# Patient Record
Sex: FEMALE | Race: WHITE | NOT HISPANIC OR LATINO | Employment: UNEMPLOYED | RURAL
[De-identification: names, ages, dates, MRNs, and addresses within clinical notes are randomized per-mention and may not be internally consistent; named-entity substitution may affect disease eponyms.]

---

## 2017-02-09 ENCOUNTER — HISTORICAL (OUTPATIENT)
Dept: ADMINISTRATIVE | Facility: HOSPITAL | Age: 57
End: 2017-02-09

## 2017-02-10 LAB
LAB AP CLINICAL INFORMATION: NORMAL
LAB AP COMMENTS: NORMAL
LAB AP DIAGNOSIS - HISTORICAL: NORMAL
LAB AP GROSS PATHOLOGY - HISTORICAL: NORMAL
LAB AP SPECIMEN SUBMITTED - HISTORICAL: NORMAL

## 2019-08-21 ENCOUNTER — HISTORICAL (OUTPATIENT)
Dept: ADMINISTRATIVE | Facility: HOSPITAL | Age: 59
End: 2019-08-21

## 2019-08-21 LAB
CRC RECOMMENDATION EXT: NORMAL
HM COLONOSCOPY: NEGATIVE

## 2019-08-22 LAB
LAB AP CLINICAL INFORMATION: NORMAL
LAB AP DIAGNOSIS - HISTORICAL: NORMAL
LAB AP GROSS PATHOLOGY - HISTORICAL: NORMAL
LAB AP SPECIMEN SUBMITTED - HISTORICAL: NORMAL

## 2020-12-10 ENCOUNTER — HISTORICAL (OUTPATIENT)
Dept: ADMINISTRATIVE | Facility: HOSPITAL | Age: 60
End: 2020-12-10

## 2020-12-10 LAB
ANION GAP SERPL CALCULATED.3IONS-SCNC: 14 MMOL/L
BASOPHILS # BLD AUTO: 0.01 X10E3/UL (ref 0–0.2)
BASOPHILS NFR BLD AUTO: 0.1 % (ref 0–1)
BUN SERPL-MCNC: 15 MG/DL (ref 7–18)
CALCIUM SERPL-MCNC: 8.1 MG/DL (ref 8.5–10.1)
CHLORIDE SERPL-SCNC: 102 MMOL/L (ref 98–107)
CO2 SERPL-SCNC: 29 MMOL/L (ref 21–32)
CREAT SERPL-MCNC: 0.96 MG/DL (ref 0.55–1.02)
EOSINOPHIL # BLD AUTO: 0 X10E3/UL (ref 0–0.5)
EOSINOPHIL NFR BLD AUTO: 0 % (ref 1–4)
ERYTHROCYTE [DISTWIDTH] IN BLOOD BY AUTOMATED COUNT: 13.8 % (ref 11.5–14.5)
GLUCOSE SERPL-MCNC: 140 MG/DL (ref 74–106)
HCT VFR BLD AUTO: 37.8 % (ref 38–47)
HGB BLD-MCNC: 12.4 G/DL (ref 12–16)
IMM GRANULOCYTES # BLD AUTO: 0.03 X10E3/UL (ref 0–0.04)
IMM GRANULOCYTES NFR BLD: 0.4 % (ref 0–0.4)
LYMPHOCYTES # BLD AUTO: 1.55 X10E3/UL (ref 1–4.8)
LYMPHOCYTES NFR BLD AUTO: 19.5 % (ref 27–41)
MCH RBC QN AUTO: 30.2 PG (ref 27–31)
MCHC RBC AUTO-ENTMCNC: 32.8 G/DL (ref 32–36)
MCV RBC AUTO: 92.2 FL (ref 80–96)
MONOCYTES # BLD AUTO: 0.47 X10E3/UL (ref 0–0.8)
MONOCYTES NFR BLD AUTO: 5.9 % (ref 2–6)
MPC BLD CALC-MCNC: 10.7 FL (ref 9.4–12.4)
NEUTROPHILS # BLD AUTO: 5.87 X10E3/UL (ref 1.8–7.7)
NEUTROPHILS NFR BLD AUTO: 74.1 % (ref 53–65)
NRBC # BLD AUTO: 0 X10E3/UL (ref 0–0)
NRBC, AUTO (.00): 0 /100 (ref 0–0)
PLATELET # BLD AUTO: 210 X10E3/UL (ref 150–400)
POTASSIUM SERPL-SCNC: 3.2 MMOL/L (ref 3.5–5.1)
RBC # BLD AUTO: 4.1 X10E6/UL (ref 4.2–5.4)
SODIUM SERPL-SCNC: 142 MMOL/L (ref 136–145)
WBC # BLD AUTO: 7.93 X10E3/UL (ref 4.5–11)

## 2020-12-11 ENCOUNTER — HISTORICAL (OUTPATIENT)
Dept: ADMINISTRATIVE | Facility: HOSPITAL | Age: 60
End: 2020-12-11

## 2020-12-14 ENCOUNTER — HISTORICAL (OUTPATIENT)
Dept: ADMINISTRATIVE | Facility: HOSPITAL | Age: 60
End: 2020-12-14

## 2021-05-20 ENCOUNTER — OFFICE VISIT (OUTPATIENT)
Dept: FAMILY MEDICINE | Facility: CLINIC | Age: 61
End: 2021-05-20
Payer: COMMERCIAL

## 2021-05-20 VITALS
TEMPERATURE: 98 F | HEART RATE: 76 BPM | WEIGHT: 236 LBS | SYSTOLIC BLOOD PRESSURE: 136 MMHG | HEIGHT: 66 IN | DIASTOLIC BLOOD PRESSURE: 78 MMHG | BODY MASS INDEX: 37.93 KG/M2

## 2021-05-20 DIAGNOSIS — I10 ESSENTIAL HYPERTENSION: Primary | ICD-10-CM

## 2021-05-20 DIAGNOSIS — E78.5 DYSLIPIDEMIA: ICD-10-CM

## 2021-05-20 PROCEDURE — 99213 PR OFFICE/OUTPT VISIT, EST, LEVL III, 20-29 MIN: ICD-10-PCS | Mod: ,,, | Performed by: FAMILY MEDICINE

## 2021-05-20 PROCEDURE — 3008F PR BODY MASS INDEX (BMI) DOCUMENTED: ICD-10-PCS | Mod: ,,, | Performed by: FAMILY MEDICINE

## 2021-05-20 PROCEDURE — 3008F BODY MASS INDEX DOCD: CPT | Mod: ,,, | Performed by: FAMILY MEDICINE

## 2021-05-20 PROCEDURE — 99213 OFFICE O/P EST LOW 20 MIN: CPT | Mod: ,,, | Performed by: FAMILY MEDICINE

## 2021-05-20 RX ORDER — LISINOPRIL 5 MG/1
5 TABLET ORAL DAILY
Qty: 90 TABLET | Refills: 1 | Status: SHIPPED | OUTPATIENT
Start: 2021-05-20 | End: 2021-11-19 | Stop reason: SDUPTHER

## 2021-05-20 RX ORDER — MONTELUKAST SODIUM 10 MG/1
10 TABLET ORAL DAILY
Qty: 90 TABLET | Refills: 1 | Status: SHIPPED | OUTPATIENT
Start: 2021-05-20 | End: 2021-11-19 | Stop reason: SDUPTHER

## 2021-05-20 RX ORDER — MIRTAZAPINE 30 MG/1
TABLET, FILM COATED ORAL
COMMUNITY
Start: 2021-05-19 | End: 2023-12-15

## 2021-05-20 RX ORDER — HYDROCODONE BITARTRATE AND ACETAMINOPHEN 5; 325 MG/1; MG/1
TABLET ORAL
COMMUNITY
Start: 2021-05-07

## 2021-05-20 RX ORDER — ALBUTEROL SULFATE 90 UG/1
2 AEROSOL, METERED RESPIRATORY (INHALATION) EVERY 4 HOURS PRN
COMMUNITY
Start: 2021-02-19

## 2021-05-20 RX ORDER — ATORVASTATIN CALCIUM 10 MG/1
10 TABLET, FILM COATED ORAL DAILY
Qty: 90 TABLET | Refills: 1 | Status: SHIPPED | OUTPATIENT
Start: 2021-05-20 | End: 2021-11-19 | Stop reason: SDUPTHER

## 2021-05-20 RX ORDER — ARIPIPRAZOLE 15 MG/1
TABLET ORAL
COMMUNITY
Start: 2021-05-19 | End: 2023-09-18 | Stop reason: SDUPTHER

## 2021-05-20 RX ORDER — DULOXETIN HYDROCHLORIDE 30 MG/1
30 CAPSULE, DELAYED RELEASE ORAL
COMMUNITY

## 2021-05-20 RX ORDER — MELOXICAM 15 MG/1
15 TABLET ORAL DAILY
COMMUNITY
Start: 2021-02-12 | End: 2022-05-23

## 2021-05-20 RX ORDER — TRAZODONE HYDROCHLORIDE 100 MG/1
TABLET ORAL
COMMUNITY
Start: 2021-03-25

## 2021-05-20 RX ORDER — PRAMIPEXOLE DIHYDROCHLORIDE 0.5 MG/1
TABLET ORAL
COMMUNITY
Start: 2021-03-25 | End: 2023-04-10

## 2021-10-19 ENCOUNTER — CLINICAL SUPPORT (OUTPATIENT)
Dept: FAMILY MEDICINE | Facility: CLINIC | Age: 61
End: 2021-10-19
Payer: COMMERCIAL

## 2021-10-19 VITALS
BODY MASS INDEX: 38.25 KG/M2 | OXYGEN SATURATION: 97 % | HEART RATE: 90 BPM | DIASTOLIC BLOOD PRESSURE: 86 MMHG | HEIGHT: 66 IN | SYSTOLIC BLOOD PRESSURE: 130 MMHG | WEIGHT: 238 LBS | TEMPERATURE: 98 F

## 2021-10-19 DIAGNOSIS — Z23 NEEDS FLU SHOT: Primary | ICD-10-CM

## 2021-10-19 PROCEDURE — 90686 IIV4 VACC NO PRSV 0.5 ML IM: CPT | Mod: ,,, | Performed by: FAMILY MEDICINE

## 2021-10-19 PROCEDURE — 90686 FLU VACCINE (QUAD) GREATER THAN OR EQUAL TO 3YO PRESERVATIVE FREE IM: ICD-10-PCS | Mod: ,,, | Performed by: FAMILY MEDICINE

## 2021-10-19 PROCEDURE — 90471 FLU VACCINE (QUAD) GREATER THAN OR EQUAL TO 3YO PRESERVATIVE FREE IM: ICD-10-PCS | Mod: ,,, | Performed by: FAMILY MEDICINE

## 2021-10-19 PROCEDURE — 90471 IMMUNIZATION ADMIN: CPT | Mod: ,,, | Performed by: FAMILY MEDICINE

## 2021-11-19 ENCOUNTER — OFFICE VISIT (OUTPATIENT)
Dept: FAMILY MEDICINE | Facility: CLINIC | Age: 61
End: 2021-11-19
Payer: COMMERCIAL

## 2021-11-19 VITALS
WEIGHT: 231 LBS | OXYGEN SATURATION: 96 % | DIASTOLIC BLOOD PRESSURE: 80 MMHG | SYSTOLIC BLOOD PRESSURE: 130 MMHG | BODY MASS INDEX: 37.12 KG/M2 | TEMPERATURE: 97 F | HEIGHT: 66 IN | HEART RATE: 94 BPM

## 2021-11-19 DIAGNOSIS — R73.9 HYPERGLYCEMIA: Primary | ICD-10-CM

## 2021-11-19 DIAGNOSIS — I10 ESSENTIAL HYPERTENSION: ICD-10-CM

## 2021-11-19 DIAGNOSIS — M54.16 LUMBAR RADICULOPATHY: ICD-10-CM

## 2021-11-19 DIAGNOSIS — M54.16 LUMBAR RADICULOPATHY, CHRONIC: ICD-10-CM

## 2021-11-19 LAB
ANION GAP SERPL CALCULATED.3IONS-SCNC: 10 MMOL/L (ref 7–16)
BUN SERPL-MCNC: 23 MG/DL (ref 7–18)
BUN/CREAT SERPL: 26 (ref 6–20)
CALCIUM SERPL-MCNC: 8.7 MG/DL (ref 8.5–10.1)
CHLORIDE SERPL-SCNC: 104 MMOL/L (ref 98–107)
CO2 SERPL-SCNC: 30 MMOL/L (ref 21–32)
CREAT SERPL-MCNC: 0.9 MG/DL (ref 0.55–1.02)
EST. AVERAGE GLUCOSE BLD GHB EST-MCNC: 107 MG/DL
GLUCOSE SERPL-MCNC: 113 MG/DL (ref 74–106)
HBA1C MFR BLD HPLC: 5.8 % (ref 4.5–6.6)
POTASSIUM SERPL-SCNC: 3.9 MMOL/L (ref 3.5–5.1)
SODIUM SERPL-SCNC: 140 MMOL/L (ref 136–145)

## 2021-11-19 PROCEDURE — 3079F DIAST BP 80-89 MM HG: CPT | Mod: ,,, | Performed by: FAMILY MEDICINE

## 2021-11-19 PROCEDURE — 1160F RVW MEDS BY RX/DR IN RCRD: CPT | Mod: ,,, | Performed by: FAMILY MEDICINE

## 2021-11-19 PROCEDURE — 83036 HEMOGLOBIN A1C: ICD-10-PCS | Mod: ,,, | Performed by: CLINICAL MEDICAL LABORATORY

## 2021-11-19 PROCEDURE — 1159F MED LIST DOCD IN RCRD: CPT | Mod: ,,, | Performed by: FAMILY MEDICINE

## 2021-11-19 PROCEDURE — 3075F SYST BP GE 130 - 139MM HG: CPT | Mod: ,,, | Performed by: FAMILY MEDICINE

## 2021-11-19 PROCEDURE — 80048 BASIC METABOLIC PNL TOTAL CA: CPT | Mod: ,,, | Performed by: CLINICAL MEDICAL LABORATORY

## 2021-11-19 PROCEDURE — 4010F ACE/ARB THERAPY RXD/TAKEN: CPT | Mod: ,,, | Performed by: FAMILY MEDICINE

## 2021-11-19 PROCEDURE — 1160F PR REVIEW ALL MEDS BY PRESCRIBER/CLIN PHARMACIST DOCUMENTED: ICD-10-PCS | Mod: ,,, | Performed by: FAMILY MEDICINE

## 2021-11-19 PROCEDURE — 99214 OFFICE O/P EST MOD 30 MIN: CPT | Mod: ,,, | Performed by: FAMILY MEDICINE

## 2021-11-19 PROCEDURE — 4010F PR ACE/ARB THEARPY RXD/TAKEN: ICD-10-PCS | Mod: ,,, | Performed by: FAMILY MEDICINE

## 2021-11-19 PROCEDURE — 1159F PR MEDICATION LIST DOCUMENTED IN MEDICAL RECORD: ICD-10-PCS | Mod: ,,, | Performed by: FAMILY MEDICINE

## 2021-11-19 PROCEDURE — 3008F BODY MASS INDEX DOCD: CPT | Mod: ,,, | Performed by: FAMILY MEDICINE

## 2021-11-19 PROCEDURE — 3008F PR BODY MASS INDEX (BMI) DOCUMENTED: ICD-10-PCS | Mod: ,,, | Performed by: FAMILY MEDICINE

## 2021-11-19 PROCEDURE — 83036 HEMOGLOBIN GLYCOSYLATED A1C: CPT | Mod: ,,, | Performed by: CLINICAL MEDICAL LABORATORY

## 2021-11-19 PROCEDURE — 80048 BASIC METABOLIC PANEL: ICD-10-PCS | Mod: ,,, | Performed by: CLINICAL MEDICAL LABORATORY

## 2021-11-19 PROCEDURE — 99214 PR OFFICE/OUTPT VISIT, EST, LEVL IV, 30-39 MIN: ICD-10-PCS | Mod: ,,, | Performed by: FAMILY MEDICINE

## 2021-11-19 PROCEDURE — 3079F PR MOST RECENT DIASTOLIC BLOOD PRESSURE 80-89 MM HG: ICD-10-PCS | Mod: ,,, | Performed by: FAMILY MEDICINE

## 2021-11-19 PROCEDURE — 3075F PR MOST RECENT SYSTOLIC BLOOD PRESS GE 130-139MM HG: ICD-10-PCS | Mod: ,,, | Performed by: FAMILY MEDICINE

## 2021-11-19 RX ORDER — MONTELUKAST SODIUM 10 MG/1
10 TABLET ORAL DAILY
Qty: 90 TABLET | Refills: 1 | Status: SHIPPED | OUTPATIENT
Start: 2021-11-19 | End: 2022-08-13 | Stop reason: SDUPTHER

## 2021-11-19 RX ORDER — ATORVASTATIN CALCIUM 10 MG/1
10 TABLET, FILM COATED ORAL DAILY
Qty: 90 TABLET | Refills: 1 | Status: SHIPPED | OUTPATIENT
Start: 2021-11-19 | End: 2022-07-04 | Stop reason: SDUPTHER

## 2021-11-19 RX ORDER — LISINOPRIL 5 MG/1
5 TABLET ORAL DAILY
Qty: 90 TABLET | Refills: 1 | Status: SHIPPED | OUTPATIENT
Start: 2021-11-19 | End: 2022-11-11 | Stop reason: SDUPTHER

## 2021-11-22 ENCOUNTER — TELEPHONE (OUTPATIENT)
Dept: FAMILY MEDICINE | Facility: CLINIC | Age: 61
End: 2021-11-22
Payer: COMMERCIAL

## 2021-11-22 RX ORDER — DIAZEPAM 10 MG/1
TABLET ORAL
Qty: 1 TABLET | Refills: 0 | Status: SHIPPED | OUTPATIENT
Start: 2021-11-22 | End: 2022-11-11

## 2021-11-23 ENCOUNTER — IMMUNIZATION (OUTPATIENT)
Dept: FAMILY MEDICINE | Facility: CLINIC | Age: 61
End: 2021-11-23
Payer: COMMERCIAL

## 2021-11-23 DIAGNOSIS — Z23 NEED FOR VACCINATION: Primary | ICD-10-CM

## 2021-11-23 PROCEDURE — 91306 COVID-19, MRNA, LNP-S, PF, 100 MCG/0.25 ML DOSE VACCINE (MODERNA BOOSTER): CPT | Mod: ,,, | Performed by: FAMILY MEDICINE

## 2021-11-23 PROCEDURE — 0064A COVID-19, MRNA, LNP-S, PF, 100 MCG/0.25 ML DOSE VACCINE (MODERNA BOOSTER): ICD-10-PCS | Mod: ,,, | Performed by: FAMILY MEDICINE

## 2021-11-23 PROCEDURE — 0064A COVID-19, MRNA, LNP-S, PF, 100 MCG/0.25 ML DOSE VACCINE (MODERNA BOOSTER): CPT | Mod: ,,, | Performed by: FAMILY MEDICINE

## 2021-11-23 PROCEDURE — 91306 COVID-19, MRNA, LNP-S, PF, 100 MCG/0.25 ML DOSE VACCINE (MODERNA BOOSTER): ICD-10-PCS | Mod: ,,, | Performed by: FAMILY MEDICINE

## 2021-12-06 ENCOUNTER — TELEPHONE (OUTPATIENT)
Dept: FAMILY MEDICINE | Facility: CLINIC | Age: 61
End: 2021-12-06
Payer: COMMERCIAL

## 2022-01-04 DIAGNOSIS — M54.16 LUMBAR RADICULOPATHY: Primary | ICD-10-CM

## 2022-01-05 ENCOUNTER — OFFICE VISIT (OUTPATIENT)
Dept: SPINE | Facility: CLINIC | Age: 62
End: 2022-01-05
Payer: COMMERCIAL

## 2022-01-05 ENCOUNTER — HOSPITAL ENCOUNTER (OUTPATIENT)
Dept: RADIOLOGY | Facility: HOSPITAL | Age: 62
Discharge: HOME | End: 2022-01-05
Attending: ORTHOPAEDIC SURGERY
Payer: COMMERCIAL

## 2022-01-05 DIAGNOSIS — M54.16 LUMBAR RADICULOPATHY: ICD-10-CM

## 2022-01-05 PROCEDURE — 1159F PR MEDICATION LIST DOCUMENTED IN MEDICAL RECORD: ICD-10-PCS | Mod: ,,, | Performed by: ORTHOPAEDIC SURGERY

## 2022-01-05 PROCEDURE — 72110 X-RAY EXAM L-2 SPINE 4/>VWS: CPT | Mod: TC

## 2022-01-05 PROCEDURE — 72110 XR LUMBAR SPINE 5 VIEW WITH FLEX AND EXT: ICD-10-PCS | Mod: 26,,, | Performed by: ORTHOPAEDIC SURGERY

## 2022-01-05 PROCEDURE — 1159F MED LIST DOCD IN RCRD: CPT | Mod: ,,, | Performed by: ORTHOPAEDIC SURGERY

## 2022-01-05 PROCEDURE — 99204 OFFICE O/P NEW MOD 45 MIN: CPT | Mod: S$PBB,,, | Performed by: ORTHOPAEDIC SURGERY

## 2022-01-05 PROCEDURE — 99204 PR OFFICE/OUTPT VISIT, NEW, LEVL IV, 45-59 MIN: ICD-10-PCS | Mod: S$PBB,,, | Performed by: ORTHOPAEDIC SURGERY

## 2022-01-05 PROCEDURE — 99213 OFFICE O/P EST LOW 20 MIN: CPT | Mod: PBBFAC | Performed by: ORTHOPAEDIC SURGERY

## 2022-01-05 PROCEDURE — 72110 X-RAY EXAM L-2 SPINE 4/>VWS: CPT | Mod: 26,,, | Performed by: ORTHOPAEDIC SURGERY

## 2022-01-05 NOTE — PROGRESS NOTES
MDM/time:  Greater than 45 minutes spent on this encounter including 15 minutes reviewing imaging and notes, 20 minutes with the patient, 10 minutes documentation    ASSESSMENT:  61 y.o. female with lumbar spondylolisthesis at L4-5 and L5-S1 with radiculopathy and neurogenic claudication    PLAN:  Neurontin 300 mg 1 tablet 3 times a day  Physical therapy lumbar spine continue home exercise program  Follow-up with Dr. Odonnell to discuss other epidural injections versus spinal cord stimulator  We discussed today that surgery would not be an option at this time  Follow-up as needed    HPI:  61 y.o. female here for evaluation of low back pain that radiates into bilateral hips down the side of bilateral leg stopping at the knee.  Patient reports a 6 year history of back pain fell a few years ago onto her tailbone did not fracture her tailbone that bruise did badly.  Patient reports since that time she has had issues with her back.  Has been seeing pain clinic with multiple injections and rhizotomies in the past.  Has completed physical therapy that did give some relief of pain.  Patient reports difficulty with balance due to pain in her legs but no recent falls.  Denies bladder bowel incontinence.  Difficulty walking distances due to hip and leg pain.  Currently takes hydrocodone 5/325 mg as needed for pain and Cymbalta 30 mg daily.  Has completed physical therapy and continued is to do her home medically directed exercises.  Her last epidural injection was 2021 with 2 weeks of pain relief.  Recent MRI 2021.  No prior spine surgery.  Patient is not a smoker.    IMAGIN2022 lumbar spine x-ray reviewed showed:   On the AP there is normal coronal alignment.  There are 5 non-rib-bearing lumbar vertebrae.  On the lateral there is decreased lumbar lordosis.  There is spondylotic disease with decreased disc height and osteophyte formation noted.  Grade 1 anterolisthesis at L4-5 and L5-S1    12/3/2021  MRI lumbar spine reviewed showed:  L4-5 reduced spondylolisthesis, moderate bilateral lateral recess stenosis, moderate bilateral foraminal stenosis  L5-S1 reduced spondylolisthesis mild to moderate bilateral foraminal stenosis    Past Medical History:   Diagnosis Date    Bipolar disorder     Depression     Hyperlipidemia     Hypertension      History reviewed. No pertinent surgical history.  Social History     Tobacco Use    Smoking status: Never Smoker    Smokeless tobacco: Never Used   Substance Use Topics    Alcohol use: Never    Drug use: Never      Current Outpatient Medications   Medication Instructions    albuterol (PROVENTIL/VENTOLIN HFA) 90 mcg/actuation inhaler 2 puffs, Inhalation, Every 4 hours PRN    ARIPiprazole (ABILIFY) 15 MG Tab No dose, route, or frequency recorded.    atorvastatin (LIPITOR) 20 MG tablet TAKE 1 TABLET BY MOUTH AT BEDTIME    atorvastatin (LIPITOR) 10 mg, Oral, Daily    diazePAM (VALIUM) 10 MG Tab One half to one tablet as needed before MRI    DULoxetine (CYMBALTA) 30 mg, Oral    fluticasone propionate (FLONASE) 50 mcg/actuation nasal spray SPRAY 2 SPRAYS INTO EACH NOSTRIL EVERY DAY    HYDROcodone-acetaminophen (NORCO) 5-325 mg per tablet No dose, route, or frequency recorded.    lisinopriL (PRINIVIL,ZESTRIL) 5 mg, Oral, Daily    meloxicam (MOBIC) 15 mg, Oral, Daily    mirtazapine (REMERON) 30 MG tablet No dose, route, or frequency recorded.    montelukast (SINGULAIR) 10 mg, Oral, Daily    pramipexole (MIRAPEX) 0.5 MG tablet TAKE 1 TABLET BY MOUTH EVERY DAY BEFORE BEDTIME    traZODone (DESYREL) 100 MG tablet TAKE 1 TABLET BY MOUTH EVERY DAY AT BEDTIME AS DIRECTED        EXAM:  Constitutional  General Appearance:  There is no height or weight on file to calculate BMI., NAD  Psychiatric   Orientation: Oriented to time, oriented to place, oriented to person  Mood and Affect: Active and alert, normal mood, normal affect  Gait and Station   Appearance:  Antalgic gait  to the right, normal tandem gait, unable to walk on toes, able to walk on heels    LUMBAR  Musculoskeletal System   Hips: Normal appearance, no leg length discrepancy, normal motion; left, normal motion; right    Lumbar Spine                   Inspection:  Normal alignment, normal sagittal balance                  Range of motion:  Decreased flexion, extension, lateral bending, rotation. Pain with range of motion                  Bony Palpation of the Lumbar Spine:  No tenderness of the spinous process, no tenderness of the sacrum, no tenderness of the coccyx                  Bony Palpation of the Right Hip:  No tenderness of the iliac crest, no tenderness of the sciatic notch, no tenderness of the SI joint                  Bony Palpation of the Left Hip:  No tenderness of the iliac crest, no tenderness of the sciatic notch, no tenderness of the SI joint                  Soft Tissue Palpation on the Right:  No tenderness of the paraspinal region, no tenderness of the iliolumbar region                  Soft Tissue Palpation on the Left:  No tenderness of the paraspinal region, no tenderness of the iliolumbar region    Motor Strength   L1 Right:  Hip flexion iliopsoas 5/5    L1 Left:  Hip flexion iliopsoas 5/5              L2-L4 Right:  Knee extension quadriceps 5/5, tibialis anterior 5/5              L2-L4 Left:  Knee extension quadriceps 5/5, tibialis anterior 5/5   L5 Right:  Extensor hallucis llongus 5/5,    L5 Left:  Extensor hallucis longus 5/5,    S1 Right:  Plantar flexion gastrocnemius 5/5   S1 Left:  Plantar flexion gastrocnemius 5/5    Neurological System   Ankle Reflex Right:  normal   Ankle Reflex Left: normal   Knee Reflex Right:  normal   Knee Reflex Left:  normal   Sensation on the Right:  L2 normal, L3 normal, L4 normal, L5 normal, S1 normal   Sensation on the Left:  L2 normal, L3 normal, L4 normal, L5 normal, S1 normal              Special Test on the Right:  Seated straight leg raising test  negative, no clonus of the ankle              Special Test on the Left:  Seated straight leg raising test negative, no clonus of the ankle    Skin   Lumbosacral Spine:  Normal skin    Cardiovascular System   Arterial Pulses Right:  Posterior tibialis normal, dorsalis pedis normal   Arterial Pulses Left:  Posterior tibialis normal, dorsalis pedis normal   Edema Right: None   Edema Left:  None

## 2022-01-05 NOTE — PROGRESS NOTES
AP, lateral, flexion/extension views of the lumbar spine reviewed    On the AP there is normal coronal alignment.  There are 5 non-rib-bearing lumbar vertebrae.  On the lateral there is decreased lumbar lordosis.  There is spondylotic disease with decreased disc height and osteophyte formation noted.  Grade 1 anterolisthesis at L4-5 and L5-S1    Impression:  Spondylotic changes of the lumbar spine as noted above

## 2022-01-07 RX ORDER — GABAPENTIN 300 MG/1
300 CAPSULE ORAL 3 TIMES DAILY
Qty: 90 CAPSULE | Refills: 11 | Status: SHIPPED | OUTPATIENT
Start: 2022-01-07 | End: 2023-12-15

## 2022-04-21 ENCOUNTER — OFFICE VISIT (OUTPATIENT)
Dept: FAMILY MEDICINE | Facility: CLINIC | Age: 62
End: 2022-04-21
Payer: COMMERCIAL

## 2022-04-21 VITALS
OXYGEN SATURATION: 98 % | TEMPERATURE: 98 F | WEIGHT: 228 LBS | DIASTOLIC BLOOD PRESSURE: 80 MMHG | HEART RATE: 87 BPM | BODY MASS INDEX: 36.64 KG/M2 | HEIGHT: 66 IN | SYSTOLIC BLOOD PRESSURE: 140 MMHG

## 2022-04-21 DIAGNOSIS — J30.2 SEASONAL ALLERGIES: ICD-10-CM

## 2022-04-21 DIAGNOSIS — Z20.822 ENCOUNTER FOR LABORATORY TESTING FOR COVID-19 VIRUS: Primary | ICD-10-CM

## 2022-04-21 LAB
CTP QC/QA: YES
FLUAV AG NPH QL: NEGATIVE
FLUBV AG NPH QL: NEGATIVE
SARS-COV-2 AG RESP QL IA.RAPID: NEGATIVE

## 2022-04-21 PROCEDURE — 3077F PR MOST RECENT SYSTOLIC BLOOD PRESSURE >= 140 MM HG: ICD-10-PCS | Mod: ,,, | Performed by: FAMILY MEDICINE

## 2022-04-21 PROCEDURE — 4010F ACE/ARB THERAPY RXD/TAKEN: CPT | Mod: ,,, | Performed by: FAMILY MEDICINE

## 2022-04-21 PROCEDURE — 99213 OFFICE O/P EST LOW 20 MIN: CPT | Mod: ,,, | Performed by: FAMILY MEDICINE

## 2022-04-21 PROCEDURE — 3008F BODY MASS INDEX DOCD: CPT | Mod: ,,, | Performed by: FAMILY MEDICINE

## 2022-04-21 PROCEDURE — 1159F PR MEDICATION LIST DOCUMENTED IN MEDICAL RECORD: ICD-10-PCS | Mod: ,,, | Performed by: FAMILY MEDICINE

## 2022-04-21 PROCEDURE — 3008F PR BODY MASS INDEX (BMI) DOCUMENTED: ICD-10-PCS | Mod: ,,, | Performed by: FAMILY MEDICINE

## 2022-04-21 PROCEDURE — 1160F RVW MEDS BY RX/DR IN RCRD: CPT | Mod: ,,, | Performed by: FAMILY MEDICINE

## 2022-04-21 PROCEDURE — 87428 SARSCOV & INF VIR A&B AG IA: CPT | Mod: QW,,, | Performed by: FAMILY MEDICINE

## 2022-04-21 PROCEDURE — 1159F MED LIST DOCD IN RCRD: CPT | Mod: ,,, | Performed by: FAMILY MEDICINE

## 2022-04-21 PROCEDURE — 4010F PR ACE/ARB THEARPY RXD/TAKEN: ICD-10-PCS | Mod: ,,, | Performed by: FAMILY MEDICINE

## 2022-04-21 PROCEDURE — 1160F PR REVIEW ALL MEDS BY PRESCRIBER/CLIN PHARMACIST DOCUMENTED: ICD-10-PCS | Mod: ,,, | Performed by: FAMILY MEDICINE

## 2022-04-21 PROCEDURE — 3079F DIAST BP 80-89 MM HG: CPT | Mod: ,,, | Performed by: FAMILY MEDICINE

## 2022-04-21 PROCEDURE — 3077F SYST BP >= 140 MM HG: CPT | Mod: ,,, | Performed by: FAMILY MEDICINE

## 2022-04-21 PROCEDURE — 3079F PR MOST RECENT DIASTOLIC BLOOD PRESSURE 80-89 MM HG: ICD-10-PCS | Mod: ,,, | Performed by: FAMILY MEDICINE

## 2022-04-21 PROCEDURE — 99213 PR OFFICE/OUTPT VISIT, EST, LEVL III, 20-29 MIN: ICD-10-PCS | Mod: ,,, | Performed by: FAMILY MEDICINE

## 2022-04-21 PROCEDURE — 87428 POCT SARS-COV2 (COVID) WITH FLU ANTIGEN: ICD-10-PCS | Mod: QW,,, | Performed by: FAMILY MEDICINE

## 2022-04-21 RX ORDER — CETIRIZINE HYDROCHLORIDE 10 MG/1
10 TABLET ORAL DAILY
Qty: 30 TABLET | Refills: 0 | Status: SHIPPED | OUTPATIENT
Start: 2022-04-21 | End: 2022-05-13

## 2022-04-21 RX ORDER — BENZONATATE 100 MG/1
CAPSULE ORAL
Qty: 30 CAPSULE | Refills: 0 | Status: SHIPPED | OUTPATIENT
Start: 2022-04-21 | End: 2022-11-11

## 2022-04-21 RX ORDER — ESTRADIOL 1 MG/1
TABLET ORAL
COMMUNITY
Start: 2022-04-03 | End: 2022-10-19 | Stop reason: DRUGHIGH

## 2022-04-21 RX ORDER — PREDNISONE 20 MG/1
20 TABLET ORAL DAILY
Qty: 5 TABLET | Refills: 0 | Status: SHIPPED | OUTPATIENT
Start: 2022-04-21 | End: 2022-05-23

## 2022-04-21 RX ORDER — IPRATROPIUM BROMIDE 21 UG/1
2 SPRAY, METERED NASAL 3 TIMES DAILY
Qty: 15 ML | Refills: 0 | Status: SHIPPED | OUTPATIENT
Start: 2022-04-21 | End: 2022-05-13

## 2022-04-21 NOTE — PROGRESS NOTES
[facility]    Chief Complaint      Chief Complaint   Patient presents with    Sore Throat     X1 day    Cough     X 1 day, runny nose       History of Present Illness      Maureen Easley is a 61 y.o. female who presents today for cough and congestion. Symptoms started yesterday.  with similar symptoms a few days prior. Noted nasal congestion, cough, and headache. Denies fever, chills, mylagia, or changes in taste/smell.    Past Medical History:  Past Medical History:   Diagnosis Date    Bipolar disorder     Depression     Hyperlipidemia     Hypertension        Past Surgical History:   has no past surgical history on file.    Social History:  Social History     Tobacco Use    Smoking status: Never Smoker    Smokeless tobacco: Never Used   Substance Use Topics    Alcohol use: Never    Drug use: Never       I personally reviewed all past medical, surgical, and social.     Review of Systems   Constitutional: Negative for fatigue and fever.   HENT: Positive for congestion, sneezing and sore throat. Negative for ear pain.    Eyes: Negative for pain and visual disturbance.   Respiratory: Positive for cough. Negative for chest tightness and shortness of breath.    Cardiovascular: Negative for chest pain and leg swelling.   Gastrointestinal: Negative for abdominal pain.   Genitourinary: Negative for difficulty urinating.   Musculoskeletal: Negative for gait problem and myalgias.   Skin: Negative for rash.   Neurological: Positive for headaches. Negative for dizziness and light-headedness.   Hematological: Does not bruise/bleed easily.        Medications:  Outpatient Encounter Medications as of 4/21/2022   Medication Sig Dispense Refill    albuterol (PROVENTIL/VENTOLIN HFA) 90 mcg/actuation inhaler Inhale 2 puffs into the lungs every 4 (four) hours as needed.      ARIPiprazole (ABILIFY) 15 MG Tab       atorvastatin (LIPITOR) 10 MG tablet Take 1 tablet (10 mg total) by mouth once daily. 90 tablet  1    atorvastatin (LIPITOR) 20 MG tablet TAKE 1 TABLET BY MOUTH AT BEDTIME (Patient not taking: Reported on 2021) 90 tablet 0    benzonatate (TESSALON) 100 MG capsule One to two capsules three times daily as needed for cough 30 capsule 0    cetirizine (ZYRTEC) 10 MG tablet Take 1 tablet (10 mg total) by mouth once daily. 30 tablet 0    diazePAM (VALIUM) 10 MG Tab One half to one tablet as needed before MRI 1 tablet 0    DULoxetine (CYMBALTA) 30 MG capsule Take 30 mg by mouth.      estradioL (ESTRACE) 1 MG tablet SMARTSI Tablet(s) Vaginal Twice a Week PRN      fluticasone propionate (FLONASE) 50 mcg/actuation nasal spray SPRAY 2 SPRAYS INTO EACH NOSTRIL EVERY DAY 48 mL 0    gabapentin (NEURONTIN) 300 MG capsule Take 1 capsule (300 mg total) by mouth 3 (three) times daily. 90 capsule 11    HYDROcodone-acetaminophen (NORCO) 5-325 mg per tablet       ipratropium (ATROVENT) 21 mcg (0.03 %) nasal spray 2 sprays by Nasal route 3 (three) times daily. 15 mL 0    lisinopriL (PRINIVIL,ZESTRIL) 5 MG tablet Take 1 tablet (5 mg total) by mouth once daily. 90 tablet 1    meloxicam (MOBIC) 15 MG tablet Take 15 mg by mouth once daily.      mirtazapine (REMERON) 30 MG tablet       montelukast (SINGULAIR) 10 mg tablet Take 1 tablet (10 mg total) by mouth once daily. 90 tablet 1    pramipexole (MIRAPEX) 0.5 MG tablet TAKE 1 TABLET BY MOUTH EVERY DAY BEFORE BEDTIME      predniSONE (DELTASONE) 20 MG tablet Take 1 tablet (20 mg total) by mouth once daily. 5 tablet 0    traZODone (DESYREL) 100 MG tablet TAKE 1 TABLET BY MOUTH EVERY DAY AT BEDTIME AS DIRECTED       No facility-administered encounter medications on file as of 2022.       Allergies:  Review of patient's allergies indicates:   Allergen Reactions    Clarithromycin Rash       Health Maintenance:  Immunization History   Administered Date(s) Administered    COVID-19 MRNA, LN-S PF (MODERNA HALF 0.25 ML DOSE) 2021    COVID-19, MRNA, LN-S, PF  "(Pfizer) (Purple Cap) 04/05/2021, 04/30/2021    Influenza - Quadrivalent - PF *Preferred* (6 months and older) 10/19/2021      Health Maintenance   Topic Date Due    Hepatitis C Screening  Never done    Lipid Panel  Never done    TETANUS VACCINE  Never done    Mammogram  Never done        Physical Exam      Vital Signs  Temp: 97.9 °F (36.6 °C)  Temp src: Oral  Pulse: 87  SpO2: 98 %  BP: (!) 140/80  BP Location: Left arm  Patient Position: Sitting  Height and Weight  Height: 5' 6" (167.6 cm)  Weight: 103.4 kg (228 lb)  BSA (Calculated - sq m): 2.19 sq meters  BMI (Calculated): 36.8  Weight in (lb) to have BMI = 25: 154.6]    Physical Exam  Constitutional:       Appearance: Normal appearance.   HENT:      Head: Normocephalic and atraumatic.      Right Ear: Tympanic membrane and ear canal normal.      Left Ear: Tympanic membrane and ear canal normal.      Nose: Congestion present.      Mouth/Throat:      Mouth: Mucous membranes are moist.      Pharynx: Oropharynx is clear.   Eyes:      Extraocular Movements: Extraocular movements intact.      Conjunctiva/sclera: Conjunctivae normal.      Pupils: Pupils are equal, round, and reactive to light.   Cardiovascular:      Rate and Rhythm: Normal rate and regular rhythm.      Pulses: Normal pulses.           Radial pulses are 2+ on the right side and 2+ on the left side.      Heart sounds: Normal heart sounds.   Pulmonary:      Effort: Pulmonary effort is normal.      Breath sounds: Normal breath sounds.   Abdominal:      Palpations: Abdomen is soft.      Tenderness: There is no abdominal tenderness.   Musculoskeletal:         General: Normal range of motion.      Right lower leg: No edema.      Left lower leg: No edema.   Skin:     General: Skin is warm and dry.      Findings: No rash.   Neurological:      General: No focal deficit present.      Mental Status: She is alert. Mental status is at baseline.   Psychiatric:         Mood and Affect: Mood normal.      "     Laboratory:  CBC:  Recent Labs   Lab 12/10/20  2128   WBC 7.93   RBC 4.10 L   Hemoglobin 12.4   Hematocrit 37.8 L   Platelet Count 210   MCV 92.2   MCH 30.2   MCHC 32.8     CMP:  Recent Labs   Lab 11/19/21  1012   Glucose 113 H   Calcium 8.7   Sodium 140   Potassium 3.9   CO2 30   Chloride 104   BUN 23 H     LIPIDS:      TSH:      A1C:  Recent Labs   Lab 11/19/21  1012   Hemoglobin A1C 5.8       Assessment/Plan     Maureen Ealsey is a 61 y.o.female with:     1. Encounter for laboratory testing for COVID-19 virus  - POCT SARS-COV2 (COVID) with Flu Antigen    2. Seasonal allergies  - Covid and flu negative  - prednisone 20mg daily x5 days  - intranasal Atrovent p.r.n. for nasal congestion  - benzonatate +OTC Mucinex for cough  - daily antihistamine     Total time spent face-to-face and non-face-to-face coordinating care for this encounter was: 20 min    Chronic conditions status updated as per HPI.  Other than changes above, cont current medications and maintain follow up with specialists.  Return to clinic PRN.    Tanvir Munguia DO  [facility]

## 2022-05-20 ENCOUNTER — OFFICE VISIT (OUTPATIENT)
Dept: FAMILY MEDICINE | Facility: CLINIC | Age: 62
End: 2022-05-20
Payer: COMMERCIAL

## 2022-05-20 VITALS
OXYGEN SATURATION: 96 % | TEMPERATURE: 99 F | SYSTOLIC BLOOD PRESSURE: 122 MMHG | HEART RATE: 85 BPM | WEIGHT: 233 LBS | DIASTOLIC BLOOD PRESSURE: 74 MMHG | HEIGHT: 66 IN | BODY MASS INDEX: 37.45 KG/M2

## 2022-05-20 DIAGNOSIS — Z13.1 SCREENING FOR DIABETES MELLITUS: ICD-10-CM

## 2022-05-20 DIAGNOSIS — Z13.220 SCREENING FOR LIPID DISORDERS: ICD-10-CM

## 2022-05-20 DIAGNOSIS — Z00.00 WELL ADULT EXAM: Primary | ICD-10-CM

## 2022-05-20 DIAGNOSIS — Z23 NEED FOR VACCINATION FOR ZOSTER: ICD-10-CM

## 2022-05-20 LAB
CHOLEST SERPL-MCNC: 196 MG/DL (ref 0–200)
CHOLEST/HDLC SERPL: 3.1 {RATIO}
GLUCOSE SERPL-MCNC: 98 MG/DL (ref 74–106)
HDLC SERPL-MCNC: 64 MG/DL (ref 40–60)
LDLC SERPL CALC-MCNC: 96 MG/DL
LDLC/HDLC SERPL: 1.5 {RATIO}
NONHDLC SERPL-MCNC: 132 MG/DL
TRIGL SERPL-MCNC: 178 MG/DL (ref 35–150)
VLDLC SERPL-MCNC: 36 MG/DL

## 2022-05-20 PROCEDURE — 3074F PR MOST RECENT SYSTOLIC BLOOD PRESSURE < 130 MM HG: ICD-10-PCS | Mod: ,,, | Performed by: FAMILY MEDICINE

## 2022-05-20 PROCEDURE — 3008F PR BODY MASS INDEX (BMI) DOCUMENTED: ICD-10-PCS | Mod: ,,, | Performed by: FAMILY MEDICINE

## 2022-05-20 PROCEDURE — 3074F SYST BP LT 130 MM HG: CPT | Mod: ,,, | Performed by: FAMILY MEDICINE

## 2022-05-20 PROCEDURE — 82947 ASSAY GLUCOSE BLOOD QUANT: CPT | Mod: ,,, | Performed by: CLINICAL MEDICAL LABORATORY

## 2022-05-20 PROCEDURE — 3008F BODY MASS INDEX DOCD: CPT | Mod: ,,, | Performed by: FAMILY MEDICINE

## 2022-05-20 PROCEDURE — 82947 GLUCOSE, FASTING: ICD-10-PCS | Mod: ,,, | Performed by: CLINICAL MEDICAL LABORATORY

## 2022-05-20 PROCEDURE — 80061 LIPID PANEL: ICD-10-PCS | Mod: ,,, | Performed by: CLINICAL MEDICAL LABORATORY

## 2022-05-20 PROCEDURE — 4010F PR ACE/ARB THEARPY RXD/TAKEN: ICD-10-PCS | Mod: ,,, | Performed by: FAMILY MEDICINE

## 2022-05-20 PROCEDURE — 1160F RVW MEDS BY RX/DR IN RCRD: CPT | Mod: ,,, | Performed by: FAMILY MEDICINE

## 2022-05-20 PROCEDURE — 3078F PR MOST RECENT DIASTOLIC BLOOD PRESSURE < 80 MM HG: ICD-10-PCS | Mod: ,,, | Performed by: FAMILY MEDICINE

## 2022-05-20 PROCEDURE — 3078F DIAST BP <80 MM HG: CPT | Mod: ,,, | Performed by: FAMILY MEDICINE

## 2022-05-20 PROCEDURE — 80061 LIPID PANEL: CPT | Mod: ,,, | Performed by: CLINICAL MEDICAL LABORATORY

## 2022-05-20 PROCEDURE — 99396 PR PREVENTIVE VISIT,EST,40-64: ICD-10-PCS | Mod: ,,, | Performed by: FAMILY MEDICINE

## 2022-05-20 PROCEDURE — 1160F PR REVIEW ALL MEDS BY PRESCRIBER/CLIN PHARMACIST DOCUMENTED: ICD-10-PCS | Mod: ,,, | Performed by: FAMILY MEDICINE

## 2022-05-20 PROCEDURE — 4010F ACE/ARB THERAPY RXD/TAKEN: CPT | Mod: ,,, | Performed by: FAMILY MEDICINE

## 2022-05-20 PROCEDURE — 1159F MED LIST DOCD IN RCRD: CPT | Mod: ,,, | Performed by: FAMILY MEDICINE

## 2022-05-20 PROCEDURE — 1159F PR MEDICATION LIST DOCUMENTED IN MEDICAL RECORD: ICD-10-PCS | Mod: ,,, | Performed by: FAMILY MEDICINE

## 2022-05-20 PROCEDURE — 99396 PREV VISIT EST AGE 40-64: CPT | Mod: ,,, | Performed by: FAMILY MEDICINE

## 2022-05-23 ENCOUNTER — TELEPHONE (OUTPATIENT)
Dept: FAMILY MEDICINE | Facility: CLINIC | Age: 62
End: 2022-05-23
Payer: COMMERCIAL

## 2022-05-23 NOTE — TELEPHONE ENCOUNTER
----- Message from Tanvir Munguia DO sent at 5/23/2022  9:09 AM CDT -----  Blood sugar and cholesterol normal

## 2022-05-23 NOTE — TELEPHONE ENCOUNTER
Pt's , Edmund, notified on lab findings, no questions or concerns at this time. Voiced understanding.

## 2022-05-23 NOTE — PROGRESS NOTES
[facility]    Chief Complaint      Chief Complaint   Patient presents with    Healthy You       History of Present Illness      Maureen Easley is a 61 y.o. female with chronic conditions of HTN, bipolar disorder, lumbar radiculopathy, dyslipidemia and vitamin D deficiency who presents today for Healthy You visit. Pt states she is doing well overall with no acute complaints. Denies FHx of breast, colon or cervical cancer. Last colonoscopy was 8/21/19 with recs to repeat in 5 years. Last pap and mammogram were in October 2021. Nonsmoker. Had yearly flu shot and both doses of Covid vaccine with booster. Working on improving diet and exercise. No hx of shingles vaccine.    Past Medical History:  Past Medical History:   Diagnosis Date    Bipolar disorder     Depression     Hyperlipidemia     Hypertension        Past Surgical History:   has no past surgical history on file.    Social History:  Social History     Tobacco Use    Smoking status: Never Smoker    Smokeless tobacco: Never Used   Substance Use Topics    Alcohol use: Never    Drug use: Never       I personally reviewed all past medical, surgical, and social.     Review of Systems   Constitutional: Negative for fatigue and fever.   HENT: Negative for ear pain.    Eyes: Negative for pain and visual disturbance.   Respiratory: Negative for chest tightness and shortness of breath.    Cardiovascular: Negative for chest pain and leg swelling.   Gastrointestinal: Negative for abdominal pain.   Genitourinary: Negative for difficulty urinating.   Musculoskeletal: Negative for gait problem and myalgias.   Skin: Negative for rash.   Neurological: Negative for dizziness and light-headedness.   Hematological: Does not bruise/bleed easily.        Medications:  Outpatient Encounter Medications as of 5/20/2022   Medication Sig Dispense Refill    albuterol (PROVENTIL/VENTOLIN HFA) 90 mcg/actuation inhaler Inhale 2 puffs into the lungs every 4 (four) hours as  needed.      ARIPiprazole (ABILIFY) 15 MG Tab       atorvastatin (LIPITOR) 10 MG tablet Take 1 tablet (10 mg total) by mouth once daily. 90 tablet 1    atorvastatin (LIPITOR) 20 MG tablet TAKE 1 TABLET BY MOUTH AT BEDTIME (Patient not taking: Reported on 2021) 90 tablet 0    benzonatate (TESSALON) 100 MG capsule One to two capsules three times daily as needed for cough 30 capsule 0    cetirizine (ZYRTEC) 10 MG tablet TAKE 1 TABLET BY MOUTH EVERY DAY 30 tablet 0    diazePAM (VALIUM) 10 MG Tab One half to one tablet as needed before MRI 1 tablet 0    DULoxetine (CYMBALTA) 30 MG capsule Take 30 mg by mouth.      estradioL (ESTRACE) 1 MG tablet SMARTSI Tablet(s) Vaginal Twice a Week PRN      fluticasone propionate (FLONASE) 50 mcg/actuation nasal spray SPRAY 2 SPRAYS INTO EACH NOSTRIL EVERY DAY 48 mL 0    gabapentin (NEURONTIN) 300 MG capsule Take 1 capsule (300 mg total) by mouth 3 (three) times daily. 90 capsule 11    HYDROcodone-acetaminophen (NORCO) 5-325 mg per tablet       ipratropium (ATROVENT) 21 mcg (0.03 %) nasal spray SPRAY 2 SPRAYS BY NASAL ROUTE 3 TIMES DAILY. 30 mL 0    lisinopriL (PRINIVIL,ZESTRIL) 5 MG tablet Take 1 tablet (5 mg total) by mouth once daily. 90 tablet 1    meloxicam (MOBIC) 15 MG tablet Take 15 mg by mouth once daily.      mirtazapine (REMERON) 30 MG tablet       montelukast (SINGULAIR) 10 mg tablet Take 1 tablet (10 mg total) by mouth once daily. 90 tablet 1    pramipexole (MIRAPEX) 0.5 MG tablet TAKE 1 TABLET BY MOUTH EVERY DAY BEFORE BEDTIME      predniSONE (DELTASONE) 20 MG tablet Take 1 tablet (20 mg total) by mouth once daily. 5 tablet 0    traZODone (DESYREL) 100 MG tablet TAKE 1 TABLET BY MOUTH EVERY DAY AT BEDTIME AS DIRECTED       No facility-administered encounter medications on file as of 2022.       Allergies:  Review of patient's allergies indicates:   Allergen Reactions    Clarithromycin Rash       Health Maintenance:  Immunization History  "  Administered Date(s) Administered    COVID-19 MRNA, LN-S PF (MODERNA HALF 0.25 ML DOSE) 11/23/2021    COVID-19, MRNA, LN-S, PF (Pfizer) (Purple Cap) 04/05/2021, 04/30/2021    Influenza - Quadrivalent - PF *Preferred* (6 months and older) 10/19/2021      Health Maintenance   Topic Date Due    Hepatitis C Screening  Never done    TETANUS VACCINE  Never done    Mammogram  10/17/2022 (Originally 7/23/1978)    Lipid Panel  05/20/2027        Physical Exam      Vital Signs  Temp: 98.5 °F (36.9 °C)  Temp src: Temporal  Pulse: 85  SpO2: 96 %  BP: 122/74  BP Location: Right arm  Patient Position: Sitting  Height and Weight  Height: 5' 6" (167.6 cm)  Weight: 105.7 kg (233 lb)  BSA (Calculated - sq m): 2.22 sq meters  BMI (Calculated): 37.6  Weight in (lb) to have BMI = 25: 154.6]    Physical Exam  Constitutional:       Appearance: Normal appearance.   HENT:      Head: Normocephalic and atraumatic.   Eyes:      Extraocular Movements: Extraocular movements intact.      Conjunctiva/sclera: Conjunctivae normal.      Pupils: Pupils are equal, round, and reactive to light.   Cardiovascular:      Rate and Rhythm: Normal rate and regular rhythm.      Pulses: Normal pulses.           Radial pulses are 2+ on the right side and 2+ on the left side.      Heart sounds: Normal heart sounds.   Pulmonary:      Effort: Pulmonary effort is normal.      Breath sounds: Normal breath sounds.   Abdominal:      Palpations: Abdomen is soft.      Tenderness: There is no abdominal tenderness.   Musculoskeletal:         General: Normal range of motion.      Right lower leg: No edema.      Left lower leg: No edema.   Skin:     General: Skin is warm and dry.      Findings: No rash.   Neurological:      General: No focal deficit present.      Mental Status: She is alert. Mental status is at baseline.   Psychiatric:         Mood and Affect: Mood normal.          Laboratory:  CBC:  Recent Labs   Lab 12/10/20  2128   WBC 7.93   RBC 4.10 L "   Hemoglobin 12.4   Hematocrit 37.8 L   Platelet Count 210   MCV 92.2   MCH 30.2   MCHC 32.8     CMP:  Recent Labs   Lab 11/19/21  1012   Glucose 113 H   Calcium 8.7   Sodium 140   Potassium 3.9   CO2 30   Chloride 104   BUN 23 H     LIPIDS:  Recent Labs   Lab 05/20/22  0941   HDL Cholesterol 64 H   Cholesterol 196   Triglycerides 178 H   LDL Calculated 96   Cholesterol/HDL Ratio (Risk Factor) 3.1   Non-     TSH:      A1C:  Recent Labs   Lab 11/19/21  1012   Hemoglobin A1C 5.8       Assessment/Plan     Maureen Easley is a 61 y.o.female with:     1. Well adult exam  - Overall wellness goals include continued work on lifestyle modification  - Specific wellness goals include 1) balance diet 2) increased exercise frequency 3) maintain yearly wellness visit    2. Screening for lipid disorders  - Lipid Panel; Future  - Lipid Panel    3. Screening for diabetes mellitus  - Glucose, Fasting; Future  - Glucose, Fasting    4. Need for vaccination for zoster  - Pt given handwritten rx for Shingrix vaccine         Chronic conditions status updated as per HPI.  Other than changes above, cont current medications and maintain follow up with specialists.  Return to clinic in 3 months.    Tanvir Munguia DO  [facility]

## 2022-08-13 ENCOUNTER — TELEPHONE (OUTPATIENT)
Dept: FAMILY MEDICINE | Facility: CLINIC | Age: 62
End: 2022-08-13
Payer: COMMERCIAL

## 2022-08-13 RX ORDER — MONTELUKAST SODIUM 10 MG/1
10 TABLET ORAL DAILY
Qty: 90 TABLET | Refills: 1 | Status: SHIPPED | OUTPATIENT
Start: 2022-08-13 | End: 2022-11-11 | Stop reason: SDUPTHER

## 2022-08-14 NOTE — TELEPHONE ENCOUNTER
----- Message from Wilma Brito LPN sent at 8/5/2022  9:38 AM CDT -----  You never seen pt before.... debbie pt  ----- Message -----  From: Shyanne Nettles  Sent: 8/3/2022   2:26 PM CDT  To: Wilma Brito LPN    Patient is seen every 6 months and needs a refill on and montelukast (SINGULAIR) 10 mg tablet    Pharmacy: [facility] on [address] [phone]

## 2022-10-19 ENCOUNTER — OFFICE VISIT (OUTPATIENT)
Dept: OBSTETRICS AND GYNECOLOGY | Facility: CLINIC | Age: 62
End: 2022-10-19
Payer: COMMERCIAL

## 2022-10-19 VITALS
DIASTOLIC BLOOD PRESSURE: 80 MMHG | SYSTOLIC BLOOD PRESSURE: 122 MMHG | HEIGHT: 66 IN | BODY MASS INDEX: 38.09 KG/M2 | WEIGHT: 237 LBS

## 2022-10-19 DIAGNOSIS — N95.2 ATROPHIC VAGINITIS: ICD-10-CM

## 2022-10-19 DIAGNOSIS — Z01.419 NORMAL GYNECOLOGIC EXAMINATION: Primary | ICD-10-CM

## 2022-10-19 PROCEDURE — 99402 PR PREVENT COUNSEL,INDIV,30 MIN: ICD-10-PCS | Mod: S$PBB,,, | Performed by: ADVANCED PRACTICE MIDWIFE

## 2022-10-19 PROCEDURE — 4010F PR ACE/ARB THEARPY RXD/TAKEN: ICD-10-PCS | Mod: ,,, | Performed by: ADVANCED PRACTICE MIDWIFE

## 2022-10-19 PROCEDURE — 3074F SYST BP LT 130 MM HG: CPT | Mod: ,,, | Performed by: ADVANCED PRACTICE MIDWIFE

## 2022-10-19 PROCEDURE — 3079F PR MOST RECENT DIASTOLIC BLOOD PRESSURE 80-89 MM HG: ICD-10-PCS | Mod: ,,, | Performed by: ADVANCED PRACTICE MIDWIFE

## 2022-10-19 PROCEDURE — 99214 OFFICE O/P EST MOD 30 MIN: CPT | Mod: PBBFAC | Performed by: ADVANCED PRACTICE MIDWIFE

## 2022-10-19 PROCEDURE — 1159F PR MEDICATION LIST DOCUMENTED IN MEDICAL RECORD: ICD-10-PCS | Mod: ,,, | Performed by: ADVANCED PRACTICE MIDWIFE

## 2022-10-19 PROCEDURE — 1159F MED LIST DOCD IN RCRD: CPT | Mod: ,,, | Performed by: ADVANCED PRACTICE MIDWIFE

## 2022-10-19 PROCEDURE — 4010F ACE/ARB THERAPY RXD/TAKEN: CPT | Mod: ,,, | Performed by: ADVANCED PRACTICE MIDWIFE

## 2022-10-19 PROCEDURE — 3074F PR MOST RECENT SYSTOLIC BLOOD PRESSURE < 130 MM HG: ICD-10-PCS | Mod: ,,, | Performed by: ADVANCED PRACTICE MIDWIFE

## 2022-10-19 PROCEDURE — 99402 PREV MED CNSL INDIV APPRX 30: CPT | Mod: S$PBB,,, | Performed by: ADVANCED PRACTICE MIDWIFE

## 2022-10-19 PROCEDURE — 3079F DIAST BP 80-89 MM HG: CPT | Mod: ,,, | Performed by: ADVANCED PRACTICE MIDWIFE

## 2022-10-19 RX ORDER — ESTRADIOL 0.1 MG/G
1 CREAM VAGINAL
Qty: 42.5 G | Refills: 11 | Status: SHIPPED | OUTPATIENT
Start: 2022-10-20 | End: 2023-10-26 | Stop reason: SDUPTHER

## 2022-10-19 NOTE — PROGRESS NOTES
Subjective:       Patient ID: Maureen Easley is a 62 y.o. female.    Chief Complaint: Well Woman (Denies any problems at this time. Last pap was 2021)    Had pap last year, WNL  Needs MMG   Last colonoscopy 2 years, WNL  Needs refills on estrace, has been using tablets and wants to change to cream.     Review of Systems   Constitutional: Negative.    HENT: Negative.     Eyes: Negative.    Respiratory: Negative.     Cardiovascular: Negative.    Gastrointestinal: Negative.    Endocrine: Negative.    Genitourinary: Negative.    Musculoskeletal: Negative.    Integumentary:  Negative.   Allergic/Immunologic: Negative.    Neurological: Negative.    Psychiatric/Behavioral: Negative.         Objective:      Physical Exam  Vitals reviewed.   Constitutional:       Appearance: Normal appearance.   HENT:      Head: Normocephalic.   Cardiovascular:      Rate and Rhythm: Normal rate and regular rhythm.   Pulmonary:      Effort: Pulmonary effort is normal.      Breath sounds: Normal breath sounds.   Chest:   Breasts:     Right: Normal. No mass.      Left: Normal. No mass.   Abdominal:      General: Abdomen is flat.      Palpations: Abdomen is soft.   Genitourinary:     General: Normal vulva.      Exam position: Lithotomy position.      Vagina: Normal.      Cervix: No cervical motion tenderness.      Uterus: Normal. Not tender.       Adnexa: Right adnexa normal and left adnexa normal.        Right: No mass.          Left: No mass.     Musculoskeletal:         General: Normal range of motion.      Cervical back: Normal range of motion.   Skin:     General: Skin is warm and dry.   Neurological:      Mental Status: She is alert and oriented to person, place, and time.   Psychiatric:         Mood and Affect: Mood normal.         Behavior: Behavior normal.       Assessment:       Problem List Items Addressed This Visit    None  Visit Diagnoses       Normal gynecologic examination    -  Primary    Atrophic vaginitis        Relevant  Medications    estradioL (ESTRACE) 0.01 % (0.1 mg/gram) vaginal cream              Plan:       Estrace as prescribed.  F/U in 1 year or prn

## 2022-10-29 ENCOUNTER — OFFICE VISIT (OUTPATIENT)
Dept: FAMILY MEDICINE | Facility: CLINIC | Age: 62
End: 2022-10-29
Payer: COMMERCIAL

## 2022-10-29 VITALS
HEART RATE: 91 BPM | OXYGEN SATURATION: 96 % | BODY MASS INDEX: 36.96 KG/M2 | TEMPERATURE: 98 F | WEIGHT: 230 LBS | HEIGHT: 66 IN

## 2022-10-29 DIAGNOSIS — J01.00 ACUTE NON-RECURRENT MAXILLARY SINUSITIS: Primary | ICD-10-CM

## 2022-10-29 DIAGNOSIS — Z20.822 CONTACT WITH AND (SUSPECTED) EXPOSURE TO COVID-19: ICD-10-CM

## 2022-10-29 PROCEDURE — 1159F MED LIST DOCD IN RCRD: CPT | Mod: ,,, | Performed by: NURSE PRACTITIONER

## 2022-10-29 PROCEDURE — 4010F ACE/ARB THERAPY RXD/TAKEN: CPT | Mod: ,,, | Performed by: NURSE PRACTITIONER

## 2022-10-29 PROCEDURE — 99213 PR OFFICE/OUTPT VISIT, EST, LEVL III, 20-29 MIN: ICD-10-PCS | Mod: ,,, | Performed by: NURSE PRACTITIONER

## 2022-10-29 PROCEDURE — 99051 PR MEDICAL SERVICES, EVE/WKEND/HOLIDAY: ICD-10-PCS | Mod: ,,, | Performed by: NURSE PRACTITIONER

## 2022-10-29 PROCEDURE — 87428 POCT SARS-COV2 (COVID) WITH FLU ANTIGEN: ICD-10-PCS | Mod: QW,,, | Performed by: NURSE PRACTITIONER

## 2022-10-29 PROCEDURE — 1159F PR MEDICATION LIST DOCUMENTED IN MEDICAL RECORD: ICD-10-PCS | Mod: ,,, | Performed by: NURSE PRACTITIONER

## 2022-10-29 PROCEDURE — 99213 OFFICE O/P EST LOW 20 MIN: CPT | Mod: ,,, | Performed by: NURSE PRACTITIONER

## 2022-10-29 PROCEDURE — 99051 MED SERV EVE/WKEND/HOLIDAY: CPT | Mod: ,,, | Performed by: NURSE PRACTITIONER

## 2022-10-29 PROCEDURE — 87428 SARSCOV & INF VIR A&B AG IA: CPT | Mod: QW,,, | Performed by: NURSE PRACTITIONER

## 2022-10-29 PROCEDURE — 4010F PR ACE/ARB THEARPY RXD/TAKEN: ICD-10-PCS | Mod: ,,, | Performed by: NURSE PRACTITIONER

## 2022-10-29 RX ORDER — TRIAMCINOLONE ACETONIDE 55 UG/1
SPRAY, METERED NASAL
COMMUNITY
End: 2022-11-11

## 2022-10-29 RX ORDER — ASPIRIN 81 MG/1
1 TABLET ORAL
COMMUNITY

## 2022-10-29 RX ORDER — ESCITALOPRAM OXALATE 10 MG/1
1 TABLET ORAL
COMMUNITY
End: 2022-11-11

## 2022-10-29 RX ORDER — AMOXICILLIN AND CLAVULANATE POTASSIUM 875; 125 MG/1; MG/1
1 TABLET, FILM COATED ORAL EVERY 12 HOURS
Qty: 20 TABLET | Refills: 0 | Status: SHIPPED | OUTPATIENT
Start: 2022-10-29 | End: 2022-11-08

## 2022-10-29 RX ORDER — METHYLPREDNISOLONE 4 MG/1
TABLET ORAL
Qty: 21 EACH | Refills: 0 | Status: SHIPPED | OUTPATIENT
Start: 2022-10-29 | End: 2022-11-11

## 2022-10-29 RX ORDER — TRAMADOL HYDROCHLORIDE 50 MG/1
1 TABLET ORAL
COMMUNITY
End: 2022-11-11

## 2022-10-29 RX ORDER — MELOXICAM 15 MG/1
1 TABLET ORAL
COMMUNITY
End: 2022-11-11

## 2022-10-29 NOTE — PROGRESS NOTES
[facility]          Chief Complaint        Chief Complaint   Patient presents with    Generalized Body Aches    Fever    Cough    Nasal Congestion    Fatigue    Documentation Only     Symptoms began yesterday  Pt states she receive covid booster and shingle vaccine on last Thursday, also received flu vaccine last week             History of Present Illness           Maureen Easley is a 62 y.o. female with chronic conditions of bipolar disorder, depression, HTN, and HLD who presents today for congestion, cough, and body aches.  The pt's s/s started about one day ago. The pt denies high fever, sob, and chest pains.          Past Medical History:     Past Medical History:   Diagnosis Date    Bipolar disorder     Depression     Hyperlipidemia     Hypertension              Past Surgical History:      has no past surgical history on file.          Social History:     Social History     Tobacco Use    Smoking status: Never    Smokeless tobacco: Never   Substance Use Topics    Alcohol use: Never    Drug use: Never             I personally reviewed all past medical, surgical, and social.           Review of Systems   Constitutional: Negative.    HENT:  Positive for congestion, rhinorrhea and sinus pressure.    Eyes: Negative.    Respiratory:  Positive for cough.    Cardiovascular: Negative.    Gastrointestinal: Negative.    Endocrine: Negative.    Genitourinary: Negative.    Musculoskeletal: Negative.    Skin: Negative.    Allergic/Immunologic: Negative.    Neurological: Negative.    Psychiatric/Behavioral: Negative.              Medications:     Outpatient Encounter Medications as of 10/29/2022   Medication Sig Dispense Refill    albuterol (PROVENTIL/VENTOLIN HFA) 90 mcg/actuation inhaler Inhale 2 puffs into the lungs every 4 (four) hours as needed.      ARIPiprazole (ABILIFY) 15 MG Tab       aspirin (ECOTRIN) 81 MG EC tablet 1 tablet.      atorvastatin (LIPITOR) 10 MG tablet TAKE 1 TABLET BY MOUTH EVERY DAY 90  tablet 1    cetirizine (ZYRTEC) 10 MG tablet TAKE 1 TABLET BY MOUTH EVERY DAY 30 tablet 0    DULoxetine (CYMBALTA) 30 MG capsule Take 30 mg by mouth.      EScitalopram oxalate (LEXAPRO) 10 MG tablet 1 tablet.      estradioL (ESTRACE) 0.01 % (0.1 mg/gram) vaginal cream Place 1 g vaginally twice a week. 42.5 g 11    fluticasone propionate (FLONASE) 50 mcg/actuation nasal spray SPRAY 2 SPRAYS INTO EACH NOSTRIL EVERY DAY 48 mL 0    gabapentin (NEURONTIN) 300 MG capsule Take 1 capsule (300 mg total) by mouth 3 (three) times daily. 90 capsule 11    HYDROcodone-acetaminophen (NORCO) 5-325 mg per tablet       ipratropium (ATROVENT) 21 mcg (0.03 %) nasal spray SPRAY 2 SPRAYS BY NASAL ROUTE 3 TIMES DAILY. 30 mL 0    lisinopriL (PRINIVIL,ZESTRIL) 5 MG tablet Take 1 tablet (5 mg total) by mouth once daily. 90 tablet 1    meloxicam (MOBIC) 15 MG tablet 1 tablet.      mirtazapine (REMERON) 30 MG tablet       montelukast (SINGULAIR) 10 mg tablet Take 1 tablet (10 mg total) by mouth once daily. 90 tablet 1    pramipexole (MIRAPEX) 0.5 MG tablet TAKE 1 TABLET BY MOUTH EVERY DAY BEFORE BEDTIME      traMADoL (ULTRAM) 50 mg tablet 1 tablet as needed.      traZODone (DESYREL) 100 MG tablet TAKE 1 TABLET BY MOUTH EVERY DAY AT BEDTIME AS DIRECTED      triamcinolone (NASACORT) 55 mcg nasal inhaler 1 puff in each nostril      amoxicillin-clavulanate 875-125mg (AUGMENTIN) 875-125 mg per tablet Take 1 tablet by mouth every 12 (twelve) hours. for 10 days 20 tablet 0    benzonatate (TESSALON) 100 MG capsule One to two capsules three times daily as needed for cough (Patient not taking: Reported on 10/29/2022) 30 capsule 0    diazePAM (VALIUM) 10 MG Tab One half to one tablet as needed before MRI (Patient not taking: Reported on 10/29/2022) 1 tablet 0    methylPREDNISolone (MEDROL DOSEPACK) 4 mg tablet use as directed 21 each 0     No facility-administered encounter medications on file as of 10/29/2022.             Allergies:     Review of  "patient's allergies indicates:   Allergen Reactions    Clarithromycin Rash             Health Maintenance:     Immunization History   Administered Date(s) Administered    COVID-19 MRNA, LN-S PF (MODERNA HALF 0.25 ML DOSE) 11/23/2021    COVID-19 Vaccine 06/21/2022    COVID-19, MRNA, LN-S, PF (Pfizer) (Purple Cap) 04/05/2021, 04/30/2021    Influenza - Quadrivalent - PF *Preferred* (6 months and older) 10/19/2021         Health Maintenance   Topic Date Due    Hepatitis C Screening  Never done    TETANUS VACCINE  Never done    Mammogram  Never done    Lipid Panel  05/20/2027              Physical Exam           Vital Signs  Temp: 97.5 °F (36.4 °C)  Temp src: Oral  Pulse: 91  SpO2: 96 %  Height and Weight  Height: 5' 6" (167.6 cm)  Weight: 104.3 kg (230 lb)  BSA (Calculated - sq m): 2.2 sq meters  BMI (Calculated): 37.1  Weight in (lb) to have BMI = 25: 154.6]          Physical Exam  Vitals and nursing note reviewed.   Constitutional:       General: She is not in acute distress.     Appearance: Normal appearance. She is not ill-appearing.   HENT:      Head: Normocephalic.      Right Ear: External ear normal.      Left Ear: External ear normal.      Nose: Congestion and rhinorrhea present.      Right Sinus: Maxillary sinus tenderness present.      Left Sinus: Maxillary sinus tenderness present.      Mouth/Throat:      Mouth: Mucous membranes are moist.   Eyes:      Pupils: Pupils are equal, round, and reactive to light.   Cardiovascular:      Rate and Rhythm: Normal rate and regular rhythm.      Pulses: Normal pulses.      Heart sounds: Normal heart sounds. No murmur heard.    No friction rub. No gallop.   Pulmonary:      Effort: Pulmonary effort is normal. No respiratory distress.      Breath sounds: Normal breath sounds. No stridor. No wheezing, rhonchi or rales.   Abdominal:      General: There is no distension.      Palpations: Abdomen is soft.   Musculoskeletal:         General: Normal range of motion.      Cervical " back: Normal range of motion and neck supple. No rigidity or tenderness.   Skin:     General: Skin is warm and dry.      Coloration: Skin is not jaundiced or pale.   Neurological:      General: No focal deficit present.      Mental Status: She is alert and oriented to person, place, and time. Mental status is at baseline.      Cranial Nerves: No cranial nerve deficit.      Sensory: No sensory deficit.      Motor: No weakness.      Coordination: Coordination normal.      Gait: Gait normal.   Psychiatric:         Mood and Affect: Mood normal.         Behavior: Behavior normal.         Thought Content: Thought content normal.         Judgment: Judgment normal.              Laboratory:     CBC:     Recent Labs   Lab 12/10/20  2128   WBC 7.93   RBC 4.10 L   Hemoglobin 12.4   Hematocrit 37.8 L   Platelet Count 210   MCV 92.2   MCH 30.2   MCHC 32.8        CMP:     Recent Labs   Lab 11/19/21  1012   Glucose 113 H   Calcium 8.7   Sodium 140   Potassium 3.9   CO2 30   Chloride 104   BUN 23 H        LIPIDS:     Recent Labs   Lab 05/20/22  0941   HDL Cholesterol 64 H   Cholesterol 196   Triglycerides 178 H   LDL Calculated 96   Cholesterol/HDL Ratio (Risk Factor) 3.1   Non-        TSH:            A1C:     Recent Labs   Lab 11/19/21  1012   Hemoglobin A1C 5.8             Assessment/Plan          Maureen Easley is a 62 y.o.female with:           1. Acute non-recurrent maxillary sinusitis  - methylPREDNISolone (MEDROL DOSEPACK) 4 mg tablet; use as directed  Dispense: 21 each; Refill: 0  - amoxicillin-clavulanate 875-125mg (AUGMENTIN) 875-125 mg per tablet; Take 1 tablet by mouth every 12 (twelve) hours. for 10 days  Dispense: 20 tablet; Refill: 0    2. Contact with and (suspected) exposure to covid-19  - POCT SARS-COV2 (COVID) with Flu Antigen             Total time spent face-to-face and non-face-to-face coordinating care for this encounter was: 20 min          Chronic conditions status updated as per HPI.  Other than  changes above, cont current medications and maintain follow up with specialists.  Return to clinic PRN.          RAVINDER Zhu     [facility]

## 2022-11-11 ENCOUNTER — OFFICE VISIT (OUTPATIENT)
Dept: FAMILY MEDICINE | Facility: CLINIC | Age: 62
End: 2022-11-11
Payer: COMMERCIAL

## 2022-11-11 VITALS
SYSTOLIC BLOOD PRESSURE: 150 MMHG | HEART RATE: 97 BPM | WEIGHT: 235 LBS | OXYGEN SATURATION: 97 % | DIASTOLIC BLOOD PRESSURE: 88 MMHG | TEMPERATURE: 99 F | HEIGHT: 66 IN | BODY MASS INDEX: 37.77 KG/M2

## 2022-11-11 DIAGNOSIS — E78.5 HYPERLIPIDEMIA, UNSPECIFIED HYPERLIPIDEMIA TYPE: ICD-10-CM

## 2022-11-11 DIAGNOSIS — I10 ESSENTIAL HYPERTENSION: Primary | ICD-10-CM

## 2022-11-11 LAB
ALBUMIN SERPL BCP-MCNC: 3.5 G/DL (ref 3.5–5)
ALBUMIN/GLOB SERPL: 1 {RATIO}
ALP SERPL-CCNC: 72 U/L (ref 50–130)
ALT SERPL W P-5'-P-CCNC: 23 U/L (ref 13–56)
ANION GAP SERPL CALCULATED.3IONS-SCNC: 9 MMOL/L (ref 7–16)
AST SERPL W P-5'-P-CCNC: 19 U/L (ref 15–37)
BASOPHILS # BLD AUTO: 0.05 K/UL (ref 0–0.2)
BASOPHILS NFR BLD AUTO: 0.5 % (ref 0–1)
BILIRUB SERPL-MCNC: 0.3 MG/DL (ref ?–1.2)
BUN SERPL-MCNC: 18 MG/DL (ref 7–18)
BUN/CREAT SERPL: 24 (ref 6–20)
CALCIUM SERPL-MCNC: 9.1 MG/DL (ref 8.5–10.1)
CHLORIDE SERPL-SCNC: 105 MMOL/L (ref 98–107)
CO2 SERPL-SCNC: 30 MMOL/L (ref 21–32)
CREAT SERPL-MCNC: 0.75 MG/DL (ref 0.55–1.02)
DIFFERENTIAL METHOD BLD: ABNORMAL
EGFR (NO RACE VARIABLE) (RUSH/TITUS): 90 ML/MIN/1.73M²
EOSINOPHIL # BLD AUTO: 0.17 K/UL (ref 0–0.5)
EOSINOPHIL NFR BLD AUTO: 1.6 % (ref 1–4)
ERYTHROCYTE [DISTWIDTH] IN BLOOD BY AUTOMATED COUNT: 15.1 % (ref 11.5–14.5)
GLOBULIN SER-MCNC: 3.4 G/DL (ref 2–4)
GLUCOSE SERPL-MCNC: 111 MG/DL (ref 74–106)
HCT VFR BLD AUTO: 37.5 % (ref 38–47)
HGB BLD-MCNC: 12 G/DL (ref 12–16)
IMM GRANULOCYTES # BLD AUTO: 0.08 K/UL (ref 0–0.04)
IMM GRANULOCYTES NFR BLD: 0.8 % (ref 0–0.4)
LYMPHOCYTES # BLD AUTO: 3.73 K/UL (ref 1–4.8)
LYMPHOCYTES NFR BLD AUTO: 36 % (ref 27–41)
MCH RBC QN AUTO: 27.4 PG (ref 27–31)
MCHC RBC AUTO-ENTMCNC: 32 G/DL (ref 32–36)
MCV RBC AUTO: 85.6 FL (ref 80–96)
MONOCYTES # BLD AUTO: 0.7 K/UL (ref 0–0.8)
MONOCYTES NFR BLD AUTO: 6.8 % (ref 2–6)
MPC BLD CALC-MCNC: 11.2 FL (ref 9.4–12.4)
NEUTROPHILS # BLD AUTO: 5.64 K/UL (ref 1.8–7.7)
NEUTROPHILS NFR BLD AUTO: 54.3 % (ref 53–65)
NRBC # BLD AUTO: 0 X10E3/UL
NRBC, AUTO (.00): 0 %
PLATELET # BLD AUTO: 208 K/UL (ref 150–400)
POTASSIUM SERPL-SCNC: 4.1 MMOL/L (ref 3.5–5.1)
PROT SERPL-MCNC: 6.9 G/DL (ref 6.4–8.2)
RBC # BLD AUTO: 4.38 M/UL (ref 4.2–5.4)
SODIUM SERPL-SCNC: 140 MMOL/L (ref 136–145)
WBC # BLD AUTO: 10.37 K/UL (ref 4.5–11)

## 2022-11-11 PROCEDURE — 3077F PR MOST RECENT SYSTOLIC BLOOD PRESSURE >= 140 MM HG: ICD-10-PCS | Mod: ,,, | Performed by: NURSE PRACTITIONER

## 2022-11-11 PROCEDURE — 4010F PR ACE/ARB THEARPY RXD/TAKEN: ICD-10-PCS | Mod: ,,, | Performed by: NURSE PRACTITIONER

## 2022-11-11 PROCEDURE — 1160F RVW MEDS BY RX/DR IN RCRD: CPT | Mod: ,,, | Performed by: NURSE PRACTITIONER

## 2022-11-11 PROCEDURE — 3077F SYST BP >= 140 MM HG: CPT | Mod: ,,, | Performed by: NURSE PRACTITIONER

## 2022-11-11 PROCEDURE — 3008F PR BODY MASS INDEX (BMI) DOCUMENTED: ICD-10-PCS | Mod: ,,, | Performed by: NURSE PRACTITIONER

## 2022-11-11 PROCEDURE — 85025 COMPLETE CBC W/AUTO DIFF WBC: CPT | Mod: ,,, | Performed by: CLINICAL MEDICAL LABORATORY

## 2022-11-11 PROCEDURE — 1159F MED LIST DOCD IN RCRD: CPT | Mod: ,,, | Performed by: NURSE PRACTITIONER

## 2022-11-11 PROCEDURE — 80053 COMPREHEN METABOLIC PANEL: CPT | Mod: ,,, | Performed by: CLINICAL MEDICAL LABORATORY

## 2022-11-11 PROCEDURE — 4010F ACE/ARB THERAPY RXD/TAKEN: CPT | Mod: ,,, | Performed by: NURSE PRACTITIONER

## 2022-11-11 PROCEDURE — 99214 PR OFFICE/OUTPT VISIT, EST, LEVL IV, 30-39 MIN: ICD-10-PCS | Mod: ,,, | Performed by: NURSE PRACTITIONER

## 2022-11-11 PROCEDURE — 1159F PR MEDICATION LIST DOCUMENTED IN MEDICAL RECORD: ICD-10-PCS | Mod: ,,, | Performed by: NURSE PRACTITIONER

## 2022-11-11 PROCEDURE — 3008F BODY MASS INDEX DOCD: CPT | Mod: ,,, | Performed by: NURSE PRACTITIONER

## 2022-11-11 PROCEDURE — 3079F DIAST BP 80-89 MM HG: CPT | Mod: ,,, | Performed by: NURSE PRACTITIONER

## 2022-11-11 PROCEDURE — 85025 CBC WITH DIFFERENTIAL: ICD-10-PCS | Mod: ,,, | Performed by: CLINICAL MEDICAL LABORATORY

## 2022-11-11 PROCEDURE — 3079F PR MOST RECENT DIASTOLIC BLOOD PRESSURE 80-89 MM HG: ICD-10-PCS | Mod: ,,, | Performed by: NURSE PRACTITIONER

## 2022-11-11 PROCEDURE — 1160F PR REVIEW ALL MEDS BY PRESCRIBER/CLIN PHARMACIST DOCUMENTED: ICD-10-PCS | Mod: ,,, | Performed by: NURSE PRACTITIONER

## 2022-11-11 PROCEDURE — 99214 OFFICE O/P EST MOD 30 MIN: CPT | Mod: ,,, | Performed by: NURSE PRACTITIONER

## 2022-11-11 PROCEDURE — 80053 COMPREHENSIVE METABOLIC PANEL: ICD-10-PCS | Mod: ,,, | Performed by: CLINICAL MEDICAL LABORATORY

## 2022-11-11 RX ORDER — MONTELUKAST SODIUM 10 MG/1
10 TABLET ORAL DAILY
Qty: 90 TABLET | Refills: 1 | Status: SHIPPED | OUTPATIENT
Start: 2022-11-11 | End: 2023-04-10

## 2022-11-11 RX ORDER — ATORVASTATIN CALCIUM 10 MG/1
10 TABLET, FILM COATED ORAL DAILY
Qty: 90 TABLET | Refills: 1 | Status: SHIPPED | OUTPATIENT
Start: 2022-11-11 | End: 2023-04-10

## 2022-11-11 RX ORDER — LISINOPRIL 5 MG/1
5 TABLET ORAL DAILY
Qty: 90 TABLET | Refills: 1 | Status: SHIPPED | OUTPATIENT
Start: 2022-11-11 | End: 2023-04-10

## 2022-11-17 ENCOUNTER — TELEPHONE (OUTPATIENT)
Dept: FAMILY MEDICINE | Facility: CLINIC | Age: 62
End: 2022-11-17
Payer: COMMERCIAL

## 2022-11-17 LAB — BCS RECOMMENDATION EXT: NORMAL

## 2023-01-05 ENCOUNTER — OFFICE VISIT (OUTPATIENT)
Dept: DERMATOLOGY | Facility: CLINIC | Age: 63
End: 2023-01-05
Payer: COMMERCIAL

## 2023-01-05 DIAGNOSIS — L82.1 SK (SEBORRHEIC KERATOSIS): ICD-10-CM

## 2023-01-05 DIAGNOSIS — L82.0 SEBORRHEIC KERATOSES, INFLAMED: ICD-10-CM

## 2023-01-05 DIAGNOSIS — L57.8 OTHER SKIN CHANGES DUE TO CHRONIC EXPOSURE TO NONIONIZING RADIATION: Primary | ICD-10-CM

## 2023-01-05 DIAGNOSIS — L71.9 ROSACEA: ICD-10-CM

## 2023-01-05 DIAGNOSIS — Z85.828 HISTORY OF NONMELANOMA SKIN CANCER: ICD-10-CM

## 2023-01-05 PROCEDURE — 99213 OFFICE O/P EST LOW 20 MIN: CPT | Mod: 25,,, | Performed by: DERMATOLOGY

## 2023-01-05 PROCEDURE — 1160F RVW MEDS BY RX/DR IN RCRD: CPT | Mod: ,,, | Performed by: DERMATOLOGY

## 2023-01-05 PROCEDURE — 17110 DESTRUCTION B9 LES UP TO 14: CPT | Mod: ,,, | Performed by: DERMATOLOGY

## 2023-01-05 PROCEDURE — 1159F MED LIST DOCD IN RCRD: CPT | Mod: ,,, | Performed by: DERMATOLOGY

## 2023-01-05 PROCEDURE — 1160F PR REVIEW ALL MEDS BY PRESCRIBER/CLIN PHARMACIST DOCUMENTED: ICD-10-PCS | Mod: ,,, | Performed by: DERMATOLOGY

## 2023-01-05 PROCEDURE — 1159F PR MEDICATION LIST DOCUMENTED IN MEDICAL RECORD: ICD-10-PCS | Mod: ,,, | Performed by: DERMATOLOGY

## 2023-01-05 PROCEDURE — 17110 PR DESTRUCTION BENIGN LESIONS UP TO 14: ICD-10-PCS | Mod: ,,, | Performed by: DERMATOLOGY

## 2023-01-05 PROCEDURE — 99213 PR OFFICE/OUTPT VISIT, EST, LEVL III, 20-29 MIN: ICD-10-PCS | Mod: 25,,, | Performed by: DERMATOLOGY

## 2023-01-05 RX ORDER — METRONIDAZOLE 7.5 MG/G
GEL TOPICAL 2 TIMES DAILY
Qty: 45 G | Refills: 3 | Status: SHIPPED | OUTPATIENT
Start: 2023-01-05 | End: 2024-01-08 | Stop reason: SDUPTHER

## 2023-01-05 NOTE — PROGRESS NOTES
[facility]   Za Moffett MD    Patient Name: Maureen Easley  Patient YOB: 1960   Date of Service: 1/5/23    CC: Full Skin Exam    HPI: Maureen Easley is a 62 y.o. female presents today for a full skin exam.  Patient was last seen 08/2020 and dermatologic history includes NMSC. Patient is concerned today about a lesion located on the left neck.  It has been present for 6 month(s). It has not been treated in the past.  Patient is also concerned about rosacea, a lesion on the right shoulder, and a lesion on the right cheek.    Past Medical History:   Diagnosis Date    Bipolar disorder     Depression     Hyperlipidemia     Hypertension      History reviewed. No pertinent surgical history.  Review of patient's allergies indicates:   Allergen Reactions    Sulfamethoxazole-trimethoprim      Other reaction(s): Unknown    Clarithromycin Rash       Current Outpatient Medications:     albuterol (PROVENTIL/VENTOLIN HFA) 90 mcg/actuation inhaler, Inhale 2 puffs into the lungs every 4 (four) hours as needed., Disp: , Rfl:     ARIPiprazole (ABILIFY) 15 MG Tab, , Disp: , Rfl:     aspirin (ECOTRIN) 81 MG EC tablet, 1 tablet., Disp: , Rfl:     atorvastatin (LIPITOR) 10 MG tablet, Take 1 tablet (10 mg total) by mouth once daily., Disp: 90 tablet, Rfl: 1    DULoxetine (CYMBALTA) 30 MG capsule, Take 30 mg by mouth., Disp: , Rfl:     estradioL (ESTRACE) 0.01 % (0.1 mg/gram) vaginal cream, Place 1 g vaginally twice a week., Disp: 42.5 g, Rfl: 11    fluticasone propionate (FLONASE) 50 mcg/actuation nasal spray, SPRAY 2 SPRAYS INTO EACH NOSTRIL EVERY DAY, Disp: 48 mL, Rfl: 0    gabapentin (NEURONTIN) 300 MG capsule, Take 1 capsule (300 mg total) by mouth 3 (three) times daily., Disp: 90 capsule, Rfl: 11    HYDROcodone-acetaminophen (NORCO) 5-325 mg per tablet, , Disp: , Rfl:     lisinopriL (PRINIVIL,ZESTRIL) 5 MG tablet, Take 1 tablet (5 mg total) by mouth once daily., Disp: 90 tablet, Rfl: 1    metroNIDAZOLE  (METROGEL) 0.75 % gel, Apply topically 2 (two) times daily., Disp: 45 g, Rfl: 3    mirtazapine (REMERON) 30 MG tablet, , Disp: , Rfl:     montelukast (SINGULAIR) 10 mg tablet, Take 1 tablet (10 mg total) by mouth once daily., Disp: 90 tablet, Rfl: 1    pramipexole (MIRAPEX) 0.5 MG tablet, TAKE 1 TABLET BY MOUTH EVERY DAY BEFORE BEDTIME, Disp: , Rfl:     traZODone (DESYREL) 100 MG tablet, TAKE 1 TABLET BY MOUTH EVERY DAY AT BEDTIME AS DIRECTED, Disp: , Rfl:     ROS: A focused review of systems was obtained and negative.     Exam: A full skin exam was performed including scalp, hair, face, neck, chest, back, abdomen, right arm, left arm, right hand, left hand, nails, right leg, and left leg.  All areas examined were normal expect as per below in assessment and plan.  General Appearance of the patient is well developed and well nourished.  Orientation: alert and oriented x 3.  Mood and affect: pleasant.    Assessment:   The primary encounter diagnosis was Other skin changes due to chronic exposure to nonionizing radiation. Diagnoses of SK (seborrheic keratosis), History of nonmelanoma skin cancer, Rosacea, and Seborrheic keratoses, inflamed were also pertinent to this visit.    Plan:   Medications Ordered This Encounter   Medications    metroNIDAZOLE (METROGEL) 0.75 % gel     Sig: Apply topically 2 (two) times daily.     Dispense:  45 g     Refill:  3     Seborrheic Keratosis (L82.1)  - Stuck-on, warty, greasy brown papule with pseudo-horn cysts scattered on the trunk and extremities    Plan: Counseling.  I counseled the patient regarding the following:  Skin Care: Seborrheic Keratoses are benign. No treatment is necessary.  Expectations: Seborrheic Keratoses are benign warty growths. Patients get more of them as they age    Plan: Reassurance    Irritated Seborrheic Keratoses (L82.0)  Stuck-on inflamed papules with crust located on the right cheek and left shoulder  Associated diagnoses: Pruritus and Cutaneous  Inflammation    Plan: Liquid Nitrogen.  A total of 2 lesions were treated with liquid nitrogen, located on the above listed location.  This procedure was medically necessary because the lesions that were treated were: irritated and itchy. The  patient's consent was obtained including but not limited to risks of crusting, scabbing, blistering, scarring, darker  or lighter pigmentary change, recurrence, incomplete removal and infection.    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  I counseled the patient regarding the following:  Contact office if: Rosacea worsens or fails to improve despite months of treatment; patient develops nodules or  cysts.    - Will refill Metrogel    History of non-melanoma skin cancer (Z85.828)  - Well healed scar with NER  Associated diagnosis: Medical surveillance following completed treatment    Plan: Monitoring.    Other Skin Changes Due to Chronic Exposure of Nonionizing Radiation (L57.8)    Plan: Monitoring.     Plan: Sunscreen Recommendations.  I recommended a broad spectrum sunscreen with a SPF of 30 or higher. I explained that SPF 30 sunscreens block approximately 97 percent of the  sun's harmful rays. Sunscreens should be applied at least 15 minutes prior to expected sun exposure and then every 2 hours after that as long as  sun exposure continues. If swimming or exercising sunscreen should be reapplied every 45 minutes to an hour after getting wet or sweating. One  ounce, or the equivalent of a shot glass full of sunscreen, is adequate to protect the skin not covered by a bathing suit. I also recommended a lip  balm with a sunscreen as well. Sun protective clothing can be used in lieu of sunscreen but must be worn the entire time you are exposed to the  sun's rays.        Follow up in about 1 year (around 1/5/2024) for Rosacea/FSE.    Za Moffett MD

## 2023-05-06 DIAGNOSIS — I10 ESSENTIAL HYPERTENSION: ICD-10-CM

## 2023-05-08 RX ORDER — LISINOPRIL 5 MG/1
TABLET ORAL
Qty: 90 TABLET | Refills: 0 | Status: SHIPPED | OUTPATIENT
Start: 2023-05-08 | End: 2023-05-24 | Stop reason: SDUPTHER

## 2023-05-24 ENCOUNTER — OFFICE VISIT (OUTPATIENT)
Dept: FAMILY MEDICINE | Facility: CLINIC | Age: 63
End: 2023-05-24
Payer: MEDICARE

## 2023-05-24 VITALS
RESPIRATION RATE: 17 BRPM | BODY MASS INDEX: 38.41 KG/M2 | OXYGEN SATURATION: 95 % | WEIGHT: 239 LBS | HEART RATE: 88 BPM | DIASTOLIC BLOOD PRESSURE: 85 MMHG | SYSTOLIC BLOOD PRESSURE: 154 MMHG | HEIGHT: 66 IN

## 2023-05-24 DIAGNOSIS — E78.5 HYPERLIPIDEMIA, UNSPECIFIED HYPERLIPIDEMIA TYPE: ICD-10-CM

## 2023-05-24 DIAGNOSIS — I10 ESSENTIAL HYPERTENSION: Primary | ICD-10-CM

## 2023-05-24 PROBLEM — H35.073: Status: ACTIVE | Noted: 2018-08-13

## 2023-05-24 LAB
ANION GAP SERPL CALCULATED.3IONS-SCNC: 6 MMOL/L (ref 7–16)
BUN SERPL-MCNC: 17 MG/DL (ref 7–18)
BUN/CREAT SERPL: 24 (ref 6–20)
CALCIUM SERPL-MCNC: 8.9 MG/DL (ref 8.5–10.1)
CHLORIDE SERPL-SCNC: 107 MMOL/L (ref 98–107)
CO2 SERPL-SCNC: 30 MMOL/L (ref 21–32)
CREAT SERPL-MCNC: 0.7 MG/DL (ref 0.55–1.02)
EGFR (NO RACE VARIABLE) (RUSH/TITUS): 97 ML/MIN/1.73M2
GLUCOSE SERPL-MCNC: 103 MG/DL (ref 74–106)
POTASSIUM SERPL-SCNC: 3.8 MMOL/L (ref 3.5–5.1)
SODIUM SERPL-SCNC: 139 MMOL/L (ref 136–145)

## 2023-05-24 PROCEDURE — 3079F DIAST BP 80-89 MM HG: CPT | Mod: ,,, | Performed by: NURSE PRACTITIONER

## 2023-05-24 PROCEDURE — 3008F BODY MASS INDEX DOCD: CPT | Mod: ,,, | Performed by: NURSE PRACTITIONER

## 2023-05-24 PROCEDURE — 1159F MED LIST DOCD IN RCRD: CPT | Mod: ,,, | Performed by: NURSE PRACTITIONER

## 2023-05-24 PROCEDURE — 1160F RVW MEDS BY RX/DR IN RCRD: CPT | Mod: ,,, | Performed by: NURSE PRACTITIONER

## 2023-05-24 PROCEDURE — 99214 OFFICE O/P EST MOD 30 MIN: CPT | Mod: ,,, | Performed by: NURSE PRACTITIONER

## 2023-05-24 PROCEDURE — 80048 BASIC METABOLIC PNL TOTAL CA: CPT | Mod: ,,, | Performed by: CLINICAL MEDICAL LABORATORY

## 2023-05-24 PROCEDURE — 3077F SYST BP >= 140 MM HG: CPT | Mod: ,,, | Performed by: NURSE PRACTITIONER

## 2023-05-24 PROCEDURE — 3079F PR MOST RECENT DIASTOLIC BLOOD PRESSURE 80-89 MM HG: ICD-10-PCS | Mod: ,,, | Performed by: NURSE PRACTITIONER

## 2023-05-24 PROCEDURE — 99214 PR OFFICE/OUTPT VISIT, EST, LEVL IV, 30-39 MIN: ICD-10-PCS | Mod: ,,, | Performed by: NURSE PRACTITIONER

## 2023-05-24 PROCEDURE — 4010F PR ACE/ARB THEARPY RXD/TAKEN: ICD-10-PCS | Mod: ,,, | Performed by: NURSE PRACTITIONER

## 2023-05-24 PROCEDURE — 3077F PR MOST RECENT SYSTOLIC BLOOD PRESSURE >= 140 MM HG: ICD-10-PCS | Mod: ,,, | Performed by: NURSE PRACTITIONER

## 2023-05-24 PROCEDURE — 4010F ACE/ARB THERAPY RXD/TAKEN: CPT | Mod: ,,, | Performed by: NURSE PRACTITIONER

## 2023-05-24 PROCEDURE — 3008F PR BODY MASS INDEX (BMI) DOCUMENTED: ICD-10-PCS | Mod: ,,, | Performed by: NURSE PRACTITIONER

## 2023-05-24 PROCEDURE — 1160F PR REVIEW ALL MEDS BY PRESCRIBER/CLIN PHARMACIST DOCUMENTED: ICD-10-PCS | Mod: ,,, | Performed by: NURSE PRACTITIONER

## 2023-05-24 PROCEDURE — 80048 BASIC METABOLIC PANEL: ICD-10-PCS | Mod: ,,, | Performed by: CLINICAL MEDICAL LABORATORY

## 2023-05-24 PROCEDURE — 1159F PR MEDICATION LIST DOCUMENTED IN MEDICAL RECORD: ICD-10-PCS | Mod: ,,, | Performed by: NURSE PRACTITIONER

## 2023-05-24 RX ORDER — LISINOPRIL 5 MG/1
5 TABLET ORAL DAILY
Qty: 90 TABLET | Refills: 1 | Status: SHIPPED | OUTPATIENT
Start: 2023-05-24 | End: 2023-09-18 | Stop reason: SDUPTHER

## 2023-05-24 RX ORDER — PRAMIPEXOLE DIHYDROCHLORIDE 0.5 MG/1
0.5 TABLET ORAL NIGHTLY
Qty: 90 TABLET | Refills: 1 | Status: SHIPPED | OUTPATIENT
Start: 2023-05-24 | End: 2023-09-18 | Stop reason: SDUPTHER

## 2023-05-24 RX ORDER — ATORVASTATIN CALCIUM 10 MG/1
10 TABLET, FILM COATED ORAL NIGHTLY
Qty: 90 TABLET | Refills: 1 | Status: SHIPPED | OUTPATIENT
Start: 2023-05-24 | End: 2023-09-18 | Stop reason: SDUPTHER

## 2023-05-24 RX ORDER — HYDROGEN PEROXIDE 3 %
20 SOLUTION, NON-ORAL MISCELLANEOUS
COMMUNITY
End: 2023-05-24 | Stop reason: SDUPTHER

## 2023-05-24 RX ORDER — ESOMEPRAZOLE MAGNESIUM 40 MG/1
40 CAPSULE, DELAYED RELEASE ORAL
Qty: 90 CAPSULE | Refills: 1 | Status: SHIPPED | OUTPATIENT
Start: 2023-05-24 | End: 2023-09-18 | Stop reason: SDUPTHER

## 2023-05-24 RX ORDER — MONTELUKAST SODIUM 10 MG/1
10 TABLET ORAL NIGHTLY
Qty: 90 TABLET | Refills: 1 | Status: SHIPPED | OUTPATIENT
Start: 2023-05-24 | End: 2023-09-18 | Stop reason: SDUPTHER

## 2023-05-24 NOTE — PROGRESS NOTES
GAYLE Cody   [place] [address] [place] [address]     PATIENT NAME: Maureen Easley  : 1960  DATE: 23  MRN: [id number]      Billing Provider: GAYLE Cody  Level of Service:   Patient PCP Information       Provider PCP Type    GAYLE Cody General            Reason for Visit / Chief Complaint: Follow-up (HTN- REFILLS)       Update PCP  Update Chief Complaint        History of Present Illness / Problem Focused Workflow   GP is a 63 y/o WF with a PMH of depression, hypertension, hyperlipidemia and bipolar disorder who presents today for medication refills.  She endorses that she did have a mammogram at [facility] about 6-7 months ago.  She voices no concerns about current medications and side effects.    .a  Review of Systems     Review of Systems   Constitutional:  Negative for chills, fatigue and fever.   HENT:  Negative for hearing loss.    Respiratory:  Negative for cough and shortness of breath.    Cardiovascular:  Negative for chest pain and leg swelling.   Gastrointestinal:  Negative for abdominal pain.   Musculoskeletal:  Negative for gait problem.   Skin:  Negative for rash and wound.   Neurological:  Negative for dizziness and headaches.     Medical / Social / Family History     Past Medical History:   Diagnosis Date    Bipolar disorder     Depression     Hyperlipidemia     Hypertension        No past surgical history on file.    Social History  MsLiu  reports that she has never smoked. She has never used smokeless tobacco. She reports that she does not drink alcohol and does not use drugs.    Family History  Ms.'s family history includes Hypertension in an other family member.    Medications and Allergies     Medications  Outpatient Medications Marked as Taking for the 23 encounter (Office Visit) with GAYLE Cody   Medication Sig Dispense Refill    albuterol (PROVENTIL/VENTOLIN HFA) 90 mcg/actuation inhaler Inhale 2 puffs into  the lungs every 4 (four) hours as needed.      ARIPiprazole (ABILIFY) 15 MG Tab       aspirin (ECOTRIN) 81 MG EC tablet 1 tablet.      DULoxetine (CYMBALTA) 30 MG capsule Take 30 mg by mouth.      estradioL (ESTRACE) 0.01 % (0.1 mg/gram) vaginal cream Place 1 g vaginally twice a week. 42.5 g 11    fluticasone propionate (FLONASE) 50 mcg/actuation nasal spray SPRAY 2 SPRAYS INTO EACH NOSTRIL EVERY DAY 48 mL 0    gabapentin (NEURONTIN) 300 MG capsule Take 1 capsule (300 mg total) by mouth 3 (three) times daily. 90 capsule 11    HYDROcodone-acetaminophen (NORCO) 5-325 mg per tablet       metroNIDAZOLE (METROGEL) 0.75 % gel Apply topically 2 (two) times daily. 45 g 3    mirtazapine (REMERON) 30 MG tablet       traZODone (DESYREL) 100 MG tablet TAKE 1 TABLET BY MOUTH EVERY DAY AT BEDTIME AS DIRECTED      [DISCONTINUED] atorvastatin (LIPITOR) 10 MG tablet Take 1 tablet (10 mg total) by mouth every evening. 90 tablet 0    [DISCONTINUED] esomeprazole (NEXIUM) 20 MG capsule Take 20 mg by mouth before breakfast.      [DISCONTINUED] lisinopriL (PRINIVIL,ZESTRIL) 5 MG tablet TAKE 1 TABLET BY MOUTH EVERY DAY 90 tablet 0    [DISCONTINUED] montelukast (SINGULAIR) 10 mg tablet Take 1 tablet (10 mg total) by mouth every evening. 90 tablet 0    [DISCONTINUED] pramipexole (MIRAPEX) 0.5 MG tablet Take 1 tablet (0.5 mg total) by mouth every evening. 90 tablet 0       Allergies  Review of patient's allergies indicates:   Allergen Reactions    Sulfamethoxazole-trimethoprim      Other reaction(s): Unknown    Clarithromycin Rash       Physical Examination     Vitals:    05/24/23 1707   BP: (!) 154/85   Pulse: 88   Resp: 17       Physical Exam  Vitals reviewed.   Constitutional:       Appearance: Normal appearance. She is obese.   Cardiovascular:      Rate and Rhythm: Normal rate and regular rhythm.   Pulmonary:      Effort: Pulmonary effort is normal.      Breath sounds: Normal breath sounds.   Neurological:      Mental Status: She is alert  and oriented to person, place, and time.   Psychiatric:         Mood and Affect: Mood normal. Affect is flat.         Behavior: Behavior normal.         Thought Content: Thought content normal.         Judgment: Judgment normal.          Imaging / Labs     Office Visit on 11/11/2022   Component Date Value Ref Range Status    Sodium 11/11/2022 140  136 - 145 mmol/L Final    Potassium 11/11/2022 4.1  3.5 - 5.1 mmol/L Final    Chloride 11/11/2022 105  98 - 107 mmol/L Final    CO2 11/11/2022 30  21 - 32 mmol/L Final    Anion Gap 11/11/2022 9  7 - 16 mmol/L Final    Glucose 11/11/2022 111 (H)  74 - 106 mg/dL Final    BUN 11/11/2022 18  7 - 18 mg/dL Final    Creatinine 11/11/2022 0.75  0.55 - 1.02 mg/dL Final    BUN/Creatinine Ratio 11/11/2022 24 (H)  6 - 20 Final    Calcium 11/11/2022 9.1  8.5 - 10.1 mg/dL Final    Total Protein 11/11/2022 6.9  6.4 - 8.2 g/dL Final    Albumin 11/11/2022 3.5  3.5 - 5.0 g/dL Final    Globulin 11/11/2022 3.4  2.0 - 4.0 g/dL Final    A/G Ratio 11/11/2022 1.0   Final    Bilirubin, Total 11/11/2022 0.3  >0.0 - 1.2 mg/dL Final    Alk Phos 11/11/2022 72  50 - 130 U/L Final    ALT 11/11/2022 23  13 - 56 U/L Final    AST 11/11/2022 19  15 - 37 U/L Final    eGFR 11/11/2022 90  >=60 mL/min/1.73m² Final    WBC 11/11/2022 10.37  4.50 - 11.00 K/uL Final    RBC 11/11/2022 4.38  4.20 - 5.40 M/uL Final    Hemoglobin 11/11/2022 12.0  12.0 - 16.0 g/dL Final    Hematocrit 11/11/2022 37.5 (L)  38.0 - 47.0 % Final    MCV 11/11/2022 85.6  80.0 - 96.0 fL Final    MCH 11/11/2022 27.4  27.0 - 31.0 pg Final    MCHC 11/11/2022 32.0  32.0 - 36.0 g/dL Final    RDW 11/11/2022 15.1 (H)  11.5 - 14.5 % Final    Platelet Count 11/11/2022 208  150 - 400 K/uL Final    MPV 11/11/2022 11.2  9.4 - 12.4 fL Final    Neutrophils % 11/11/2022 54.3  53.0 - 65.0 % Final    Lymphocytes % 11/11/2022 36.0  27.0 - 41.0 % Final    Monocytes % 11/11/2022 6.8 (H)  2.0 - 6.0 % Final    Eosinophils % 11/11/2022 1.6  1.0 - 4.0 % Final     Basophils % 11/11/2022 0.5  0.0 - 1.0 % Final    Immature Granulocytes % 11/11/2022 0.8 (H)  0.0 - 0.4 % Final    nRBC, Auto 11/11/2022 0.0  <=0.0 % Final    Neutrophils, Abs 11/11/2022 5.64  1.80 - 7.70 K/uL Final    Lymphocytes, Absolute 11/11/2022 3.73  1.00 - 4.80 K/uL Final    Monocytes, Absolute 11/11/2022 0.70  0.00 - 0.80 K/uL Final    Eosinophils, Absolute 11/11/2022 0.17  0.00 - 0.50 K/uL Final    Basophils, Absolute 11/11/2022 0.05  0.00 - 0.20 K/uL Final    Immature Granulocytes, Absolute 11/11/2022 0.08 (H)  0.00 - 0.04 K/uL Final    nRBC, Absolute 11/11/2022 0.00  <=0.00 x10e3/uL Final    Diff Type 11/11/2022 Auto   Final          Assessment and Plan (including Health Maintenance)      Problem List  Smart Sets  Document Outside HM   :    Health Maintenance Due   Topic Date Due    Hepatitis C Screening  Never done    HIV Screening  Never done    TETANUS VACCINE  Never done    Mammogram  Never done    Shingles Vaccine (1 of 2) Never done    COVID-19 Vaccine (5 - Booster for Pfizer series) 08/16/2022       Problem List Items Addressed This Visit          Cardiac/Vascular    Essential hypertension - Primary    Overview     Goal BP less than 140/80   Lisinopril 5 mg oral daily           Current Assessment & Plan     BP Readings from Last 3 Encounters:   05/24/23 (!) 154/85   11/11/22 (!) 150/88   10/19/22 122/80   Continue current medication and treatment plan           Relevant Medications    lisinopriL (PRINIVIL,ZESTRIL) 5 MG tablet    Other Relevant Orders    Basic Metabolic Panel    Hyperlipidemia    Overview     Goals:  LDL <120 mg/dL, HDL > 40 mg/dL, triglyceride <150 mg/dL  Lipitor 10 mg nightly           Current Assessment & Plan     Continue current medications and treatment plan           Relevant Medications    atorvastatin (LIPITOR) 10 MG tablet       Health Maintenance Topics with due status: Not Due       Topic Last Completion Date    Hemoglobin A1c (Diabetic Prevention Screening) 11/19/2021     Lipid Panel 05/20/2022    Colorectal Cancer Screening Not Due       Future Appointments   Date Time Provider Department Center   8/2/2023  2:20 PM GAYLE Cody [facility]   1/8/2024  9:45 AM Za Moffett MD [facility]          Signature:  GAYLE Lawrence  Family Nurse Practitioner  [facility]    Date of encounter: 5/24/23

## 2023-05-25 NOTE — ASSESSMENT & PLAN NOTE
BP Readings from Last 3 Encounters:   05/24/23 (!) 154/85   11/11/22 (!) 150/88   10/19/22 122/80   Continue current medication and treatment plan

## 2023-06-09 ENCOUNTER — PATIENT OUTREACH (OUTPATIENT)
Dept: ADMINISTRATIVE | Facility: HOSPITAL | Age: 63
End: 2023-06-09

## 2023-06-09 DIAGNOSIS — Z71.89 COMPLEX CARE COORDINATION: ICD-10-CM

## 2023-06-09 NOTE — PROGRESS NOTES
Health maintenance record review for population health care gaps    Hyperlink patient recent mammogram 11/17/22 to patient chart for health maintenance    Health Maintenance Due   Topic Date Due    Hepatitis C Screening  Never done    HIV Screening  Never done    TETANUS VACCINE  Never done    Shingles Vaccine (1 of 2) Never done    COVID-19 Vaccine (5 - Pfizer series) 08/16/2022

## 2023-07-03 ENCOUNTER — PATIENT OUTREACH (OUTPATIENT)
Dept: ADMINISTRATIVE | Facility: HOSPITAL | Age: 63
End: 2023-07-03

## 2023-07-03 NOTE — PROGRESS NOTES
Health maintenance record review for population health care gaps    07/03/2023   --Chart accessed for:chart review  --Care Gaps addressed:colonoscopy  Outreach made to patient via NA . (Success) (Left Message) (Unavailable)   Patient stated NA  Care Everywhere updates requested and reviewed.  Media reports reviewed.  [facility] and [facility] reviewed.  Immunization Database ([employer]/MIXX) reviewed. Vaccinations uploaded:   updated with external HAC Report  Requested NA records from NA  Next appointment NA . Appointment notes updated to include: Na  Health Maintenance Due   Topic Date Due    Hepatitis C Screening  Never done    HIV Screening  Never done    TETANUS VACCINE  Never done    Shingles Vaccine (1 of 2) Never done    COVID-19 Vaccine (5 - Pfizer series) 08/16/2022    Upload patient recent colonoscopy and path report to chart for health maintenance

## 2023-07-24 ENCOUNTER — PATIENT MESSAGE (OUTPATIENT)
Dept: ADMINISTRATIVE | Facility: HOSPITAL | Age: 63
End: 2023-07-24

## 2023-07-28 ENCOUNTER — PATIENT OUTREACH (OUTPATIENT)
Dept: ADMINISTRATIVE | Facility: HOSPITAL | Age: 63
End: 2023-07-28

## 2023-07-28 NOTE — PROGRESS NOTES
Health maintenance record review for population health care gaps    Noted to appt for htn follow up for compliance for health maintenance

## 2023-09-11 ENCOUNTER — PATIENT MESSAGE (OUTPATIENT)
Dept: ADMINISTRATIVE | Facility: HOSPITAL | Age: 63
End: 2023-09-11

## 2023-09-18 ENCOUNTER — OFFICE VISIT (OUTPATIENT)
Dept: FAMILY MEDICINE | Facility: CLINIC | Age: 63
End: 2023-09-18
Payer: MEDICARE

## 2023-09-18 VITALS
BODY MASS INDEX: 38.57 KG/M2 | WEIGHT: 240 LBS | OXYGEN SATURATION: 95 % | HEIGHT: 66 IN | SYSTOLIC BLOOD PRESSURE: 160 MMHG | HEART RATE: 74 BPM | DIASTOLIC BLOOD PRESSURE: 75 MMHG

## 2023-09-18 DIAGNOSIS — J01.01 ACUTE RECURRENT MAXILLARY SINUSITIS: Primary | ICD-10-CM

## 2023-09-18 DIAGNOSIS — K21.9 GASTROESOPHAGEAL REFLUX DISEASE, UNSPECIFIED WHETHER ESOPHAGITIS PRESENT: ICD-10-CM

## 2023-09-18 DIAGNOSIS — E78.5 HYPERLIPIDEMIA, UNSPECIFIED HYPERLIPIDEMIA TYPE: ICD-10-CM

## 2023-09-18 DIAGNOSIS — E66.01 SEVERE OBESITY (BMI 35.0-39.9) WITH COMORBIDITY: ICD-10-CM

## 2023-09-18 DIAGNOSIS — I10 ESSENTIAL HYPERTENSION: ICD-10-CM

## 2023-09-18 LAB
ALBUMIN SERPL BCP-MCNC: 3.5 G/DL (ref 3.5–5)
ALBUMIN/GLOB SERPL: 1.1 {RATIO}
ALP SERPL-CCNC: 84 U/L (ref 50–130)
ALT SERPL W P-5'-P-CCNC: 24 U/L (ref 13–56)
ANION GAP SERPL CALCULATED.3IONS-SCNC: 13 MMOL/L (ref 7–16)
AST SERPL W P-5'-P-CCNC: 20 U/L (ref 15–37)
BILIRUB SERPL-MCNC: 0.5 MG/DL (ref ?–1.2)
BUN SERPL-MCNC: 23 MG/DL (ref 7–18)
BUN/CREAT SERPL: 30 (ref 6–20)
CALCIUM SERPL-MCNC: 9 MG/DL (ref 8.5–10.1)
CHLORIDE SERPL-SCNC: 104 MMOL/L (ref 98–107)
CO2 SERPL-SCNC: 30 MMOL/L (ref 21–32)
CREAT SERPL-MCNC: 0.77 MG/DL (ref 0.55–1.02)
EGFR (NO RACE VARIABLE) (RUSH/TITUS): 86 ML/MIN/1.73M2
GLOBULIN SER-MCNC: 3.2 G/DL (ref 2–4)
GLUCOSE SERPL-MCNC: 101 MG/DL (ref 74–106)
POTASSIUM SERPL-SCNC: 4.2 MMOL/L (ref 3.5–5.1)
PROT SERPL-MCNC: 6.7 G/DL (ref 6.4–8.2)
SODIUM SERPL-SCNC: 143 MMOL/L (ref 136–145)

## 2023-09-18 PROCEDURE — 3077F PR MOST RECENT SYSTOLIC BLOOD PRESSURE >= 140 MM HG: ICD-10-PCS | Mod: ,,, | Performed by: NURSE PRACTITIONER

## 2023-09-18 PROCEDURE — 3078F PR MOST RECENT DIASTOLIC BLOOD PRESSURE < 80 MM HG: ICD-10-PCS | Mod: ,,, | Performed by: NURSE PRACTITIONER

## 2023-09-18 PROCEDURE — 1159F PR MEDICATION LIST DOCUMENTED IN MEDICAL RECORD: ICD-10-PCS | Mod: ,,, | Performed by: NURSE PRACTITIONER

## 2023-09-18 PROCEDURE — 99214 PR OFFICE/OUTPT VISIT, EST, LEVL IV, 30-39 MIN: ICD-10-PCS | Mod: ,,, | Performed by: NURSE PRACTITIONER

## 2023-09-18 PROCEDURE — 3008F PR BODY MASS INDEX (BMI) DOCUMENTED: ICD-10-PCS | Mod: ,,, | Performed by: NURSE PRACTITIONER

## 2023-09-18 PROCEDURE — 1159F MED LIST DOCD IN RCRD: CPT | Mod: ,,, | Performed by: NURSE PRACTITIONER

## 2023-09-18 PROCEDURE — 80053 COMPREHEN METABOLIC PANEL: CPT | Mod: ,,, | Performed by: CLINICAL MEDICAL LABORATORY

## 2023-09-18 PROCEDURE — 4010F PR ACE/ARB THEARPY RXD/TAKEN: ICD-10-PCS | Mod: ,,, | Performed by: NURSE PRACTITIONER

## 2023-09-18 PROCEDURE — 3078F DIAST BP <80 MM HG: CPT | Mod: ,,, | Performed by: NURSE PRACTITIONER

## 2023-09-18 PROCEDURE — 99214 OFFICE O/P EST MOD 30 MIN: CPT | Mod: ,,, | Performed by: NURSE PRACTITIONER

## 2023-09-18 PROCEDURE — 4010F ACE/ARB THERAPY RXD/TAKEN: CPT | Mod: ,,, | Performed by: NURSE PRACTITIONER

## 2023-09-18 PROCEDURE — 3008F BODY MASS INDEX DOCD: CPT | Mod: ,,, | Performed by: NURSE PRACTITIONER

## 2023-09-18 PROCEDURE — 3077F SYST BP >= 140 MM HG: CPT | Mod: ,,, | Performed by: NURSE PRACTITIONER

## 2023-09-18 PROCEDURE — 80053 COMPREHENSIVE METABOLIC PANEL: ICD-10-PCS | Mod: ,,, | Performed by: CLINICAL MEDICAL LABORATORY

## 2023-09-18 RX ORDER — ARIPIPRAZOLE 10 MG/1
10 TABLET ORAL EVERY MORNING
COMMUNITY
Start: 2023-08-28

## 2023-09-18 RX ORDER — PRAMIPEXOLE DIHYDROCHLORIDE 0.5 MG/1
0.5 TABLET ORAL NIGHTLY
Qty: 90 TABLET | Refills: 1 | Status: SHIPPED | OUTPATIENT
Start: 2023-09-18

## 2023-09-18 RX ORDER — MONTELUKAST SODIUM 10 MG/1
10 TABLET ORAL NIGHTLY
Qty: 90 TABLET | Refills: 1 | Status: SHIPPED | OUTPATIENT
Start: 2023-09-18 | End: 2024-03-27

## 2023-09-18 RX ORDER — AMOXICILLIN AND CLAVULANATE POTASSIUM 875; 125 MG/1; MG/1
1 TABLET, FILM COATED ORAL EVERY 12 HOURS
Qty: 20 TABLET | Refills: 0 | Status: SHIPPED | OUTPATIENT
Start: 2023-09-18 | End: 2023-12-15

## 2023-09-18 RX ORDER — LISINOPRIL 10 MG/1
10 TABLET ORAL DAILY
Qty: 90 TABLET | Refills: 1 | Status: SHIPPED | OUTPATIENT
Start: 2023-09-18 | End: 2024-02-19

## 2023-09-18 RX ORDER — MINERAL OIL
180 ENEMA (ML) RECTAL DAILY
Qty: 90 TABLET | Refills: 0 | Status: SHIPPED | OUTPATIENT
Start: 2023-09-18 | End: 2024-04-03 | Stop reason: SDUPTHER

## 2023-09-18 RX ORDER — LISINOPRIL 5 MG/1
5 TABLET ORAL DAILY
Qty: 90 TABLET | Refills: 1 | Status: SHIPPED | OUTPATIENT
Start: 2023-09-18 | End: 2023-09-18 | Stop reason: SDUPTHER

## 2023-09-18 RX ORDER — ATORVASTATIN CALCIUM 10 MG/1
10 TABLET, FILM COATED ORAL NIGHTLY
Qty: 90 TABLET | Refills: 1 | Status: SHIPPED | OUTPATIENT
Start: 2023-09-18 | End: 2024-03-27

## 2023-09-18 RX ORDER — MIRTAZAPINE 15 MG/1
7.5 TABLET, FILM COATED ORAL
COMMUNITY
Start: 2023-08-25

## 2023-09-18 RX ORDER — ESOMEPRAZOLE MAGNESIUM 40 MG/1
40 CAPSULE, DELAYED RELEASE ORAL
Qty: 90 CAPSULE | Refills: 1 | Status: SHIPPED | OUTPATIENT
Start: 2023-09-18 | End: 2024-03-07 | Stop reason: SDUPTHER

## 2023-09-18 NOTE — PROGRESS NOTES
"   GAYLE Cody   [place] [address] [place] [address]     PATIENT NAME: Maureen Easley  : 1960  DATE: 23  MRN: [id number]      Billing Provider: GAYLE Cody  Level of Service:   Patient PCP Information       Provider PCP Type    GAYLE Cody General        Reason for Visit / Chief Complaint: Hypertension (3m fu )       Update PCP  Update Chief Complaint        History of Present Illness / Problem Focused Workflow   GP is a 64 y/o WF with PMH bipolar disorder, depression, hypertension, and hyperlipidemia who presents today for her 3 month follow up with medication refills. She has been taking OTC sinus medications and nasal spray with little relief.       Review of Systems     Review of Systems   Constitutional:  Negative for fatigue and fever.   HENT:  Positive for congestion, postnasal drip, rhinorrhea, sinus pressure, sinus pain and tinnitus. Negative for sneezing and sore throat.    Respiratory:  Positive for cough ("productive greenish, yellow tinged sputum").    Cardiovascular:  Negative for chest pain and leg swelling.   Gastrointestinal:  Negative for abdominal pain.   Musculoskeletal:  Negative for back pain, gait problem and joint swelling.   Skin:  Negative for rash and wound.   Neurological:  Negative for dizziness and headaches.   Psychiatric/Behavioral:  The patient is not nervous/anxious.      Medical / Social / Family History     Past Medical History:   Diagnosis Date    Bipolar disorder     Depression     Hyperlipidemia     Hypertension      Past Surgical History:   Procedure Laterality Date    COLONOSCOPY  2019    hx polyps     Social History  Social History     Socioeconomic History    Marital status:    Tobacco Use    Smoking status: Never    Smokeless tobacco: Never   Substance and Sexual Activity    Alcohol use: Never    Drug use: Never    Sexual activity: Yes     Partners: Male     Birth control/protection: None "      Medications and Allergies     Medications  Outpatient Medications Marked as Taking for the 9/18/23 encounter (Office Visit) with Bhakti De Los Santos FNP   Medication Sig Dispense Refill    albuterol (PROVENTIL/VENTOLIN HFA) 90 mcg/actuation inhaler Inhale 2 puffs into the lungs every 4 (four) hours as needed.      aspirin (ECOTRIN) 81 MG EC tablet 1 tablet.      DULoxetine (CYMBALTA) 30 MG capsule Take 30 mg by mouth.      estradioL (ESTRACE) 0.01 % (0.1 mg/gram) vaginal cream Place 1 g vaginally twice a week. 42.5 g 11    fluticasone propionate (FLONASE) 50 mcg/actuation nasal spray SPRAY 2 SPRAYS INTO EACH NOSTRIL EVERY DAY 48 mL 0    gabapentin (NEURONTIN) 300 MG capsule Take 1 capsule (300 mg total) by mouth 3 (three) times daily. 90 capsule 11    HYDROcodone-acetaminophen (NORCO) 5-325 mg per tablet       metroNIDAZOLE (METROGEL) 0.75 % gel Apply topically 2 (two) times daily. 45 g 3    mirtazapine (REMERON) 30 MG tablet       traZODone (DESYREL) 100 MG tablet TAKE 1 TABLET BY MOUTH EVERY DAY AT BEDTIME AS DIRECTED      [DISCONTINUED] ARIPiprazole (ABILIFY) 15 MG Tab       [DISCONTINUED] atorvastatin (LIPITOR) 10 MG tablet Take 1 tablet (10 mg total) by mouth every evening. 90 tablet 1    [DISCONTINUED] esomeprazole (NEXIUM) 40 MG capsule Take 1 capsule (40 mg total) by mouth before breakfast. 90 capsule 1    [DISCONTINUED] lisinopriL (PRINIVIL,ZESTRIL) 5 MG tablet Take 1 tablet (5 mg total) by mouth once daily. 90 tablet 1    [DISCONTINUED] montelukast (SINGULAIR) 10 mg tablet Take 1 tablet (10 mg total) by mouth every evening. 90 tablet 1    [DISCONTINUED] pramipexole (MIRAPEX) 0.5 MG tablet Take 1 tablet (0.5 mg total) by mouth every evening. 90 tablet 1       Allergies  Review of patient's allergies indicates:   Allergen Reactions    Sulfamethoxazole-trimethoprim      Other reaction(s): Unknown    Clarithromycin Rash       Physical Examination     Vitals:    09/18/23 1108   BP: (!) 160/75   Pulse:   "    Physical Exam  Vitals reviewed.   Constitutional:       Appearance: Normal appearance.   Cardiovascular:      Rate and Rhythm: Normal rate and regular rhythm.   Pulmonary:      Effort: Pulmonary effort is normal.      Breath sounds: Normal breath sounds.   Neurological:      Mental Status: She is alert and oriented to person, place, and time.   Psychiatric:         Mood and Affect: Mood normal.         Behavior: Behavior normal.         Thought Content: Thought content normal.         Judgment: Judgment normal.        Labs     Lab Results   Component Value Date     09/18/2023    K 4.2 09/18/2023     09/18/2023    CO2 30 09/18/2023    BUN 23 (H) 09/18/2023    CREATININE 0.77 09/18/2023    CALCIUM 9.0 09/18/2023    ANIONGAP 13 09/18/2023    EGFRNORACEVR 86 09/18/2023       Lab Results   Component Value Date    HGBA1C 5.8 11/19/2021      No results found for: "MICROALBUR", "[id number]"   Lab Results   Component Value Date    WBC 10.37 11/11/2022    HGB 12.0 11/11/2022    HCT 37.5 (L) 11/11/2022    MCV 85.6 11/11/2022     11/11/2022     Assessment and Plan (including Health Maintenance)      Problem List  Smart Sets  Document Outside HM   :    Health Maintenance Due   Topic Date Due    Hepatitis C Screening  Never done    HIV Screening  Never done    TETANUS VACCINE  Never done    Shingles Vaccine (1 of 2) Never done    COVID-19 Vaccine (5 - Pfizer series) 08/16/2022    Influenza Vaccine (1) 09/01/2023    Mammogram  11/17/2023       Problem List Items Addressed This Visit          Cardiac/Vascular    Essential hypertension    Overview     Goal BP less than 140/80   Lisinopril 10 mg oral daily         Current Assessment & Plan     BP Readings from Last 3 Encounters:   09/18/23 (!) 160/75   05/24/23 (!) 154/85   11/11/22 (!) 150/88   Chronic, not controlled  Increase Lisinopril to 10 mg oral daily         Relevant Medications    lisinopriL 10 MG tablet    Other Relevant Orders    Comprehensive " "Metabolic Panel (Completed)    Hyperlipidemia    Overview     Goals:  LDL <120 mg/dL, HDL > 40 mg/dL, triglyceride <150 mg/dL  Lipitor 10 mg nightly         Current Assessment & Plan     Lab Results   Component Value Date    CHOL 196 05/20/2022     Lab Results   Component Value Date    HDL 64 (H) 05/20/2022     Lab Results   Component Value Date    LDLCALC 96 05/20/2022   No results found for: "DLDL"  Lab Results   Component Value Date    TRIG 178 (H) 05/20/2022     Lab Results   Component Value Date    CHOLHDL 3.1 05/20/2022   Elevated triglycerides   Low-fat diet   Continue current medications and treatment plan         Relevant Medications    atorvastatin (LIPITOR) 10 MG tablet    Other Relevant Orders    Comprehensive Metabolic Panel (Completed)       Endocrine    Severe obesity (BMI 35.0-39.9) with comorbidity    Current Assessment & Plan     Chronic, controlled  Continue current medications and treatment plan          Other Visit Diagnoses       Acute recurrent maxillary sinusitis    -  Primary    Relevant Medications    amoxicillin-clavulanate 875-125mg (AUGMENTIN) 875-125 mg per tablet    fexofenadine (ALLEGRA ALLERGY) 180 MG tablet    Gastroesophageal reflux disease, unspecified whether esophagitis present        Relevant Medications    esomeprazole (NEXIUM) 40 MG capsule          Health Maintenance Topics with due status: Not Due       Topic Last Completion Date    Colorectal Cancer Screening 08/21/2019    Hemoglobin A1c (Diabetic Prevention Screening) 11/19/2021    Lipid Panel 05/20/2022     Future Appointments   Date Time Provider Department Center   10/26/2023 10:00 AM Ramandeep Canas CNM [facility]   10/30/2023 10:20 AM Bhakti De Los Santos FNP [facility] JOAN Krishnamurthy   1/8/2024  9:45 AM Za Moffett MD [facility]      Signature:    GAYLE Lawrence  Family Nurse Practitioner  [facility]    Date of encounter: 9/18/23    "

## 2023-09-20 NOTE — ASSESSMENT & PLAN NOTE
BP Readings from Last 3 Encounters:   09/18/23 (!) 160/75   05/24/23 (!) 154/85   11/11/22 (!) 150/88     Chronic, not controlled  Increase Lisinopril to 10 mg oral daily

## 2023-09-20 NOTE — ASSESSMENT & PLAN NOTE
"Lab Results   Component Value Date    CHOL 196 05/20/2022     Lab Results   Component Value Date    HDL 64 (H) 05/20/2022     Lab Results   Component Value Date    LDLCALC 96 05/20/2022     No results found for: "DLDL"  Lab Results   Component Value Date    TRIG 178 (H) 05/20/2022     Lab Results   Component Value Date    CHOLHDL 3.1 05/20/2022     Elevated triglycerides   Low-fat diet   Continue current medications and treatment plan  "

## 2023-10-26 ENCOUNTER — OFFICE VISIT (OUTPATIENT)
Dept: OBSTETRICS AND GYNECOLOGY | Facility: CLINIC | Age: 63
End: 2023-10-26
Payer: MEDICARE

## 2023-10-26 VITALS
RESPIRATION RATE: 20 BRPM | HEART RATE: 89 BPM | DIASTOLIC BLOOD PRESSURE: 80 MMHG | SYSTOLIC BLOOD PRESSURE: 120 MMHG | BODY MASS INDEX: 37.93 KG/M2 | HEIGHT: 66 IN | OXYGEN SATURATION: 100 % | WEIGHT: 236 LBS

## 2023-10-26 DIAGNOSIS — Z01.419 NORMAL GYNECOLOGIC EXAMINATION: Primary | ICD-10-CM

## 2023-10-26 DIAGNOSIS — N95.2 ATROPHIC VAGINITIS: ICD-10-CM

## 2023-10-26 DIAGNOSIS — Z12.4 SCREENING FOR CERVICAL CANCER: ICD-10-CM

## 2023-10-26 PROCEDURE — 3008F BODY MASS INDEX DOCD: CPT | Mod: CPTII,,, | Performed by: ADVANCED PRACTICE MIDWIFE

## 2023-10-26 PROCEDURE — 88141 THINPREP PAP TEST: ICD-10-PCS | Mod: ,,, | Performed by: PATHOLOGY

## 2023-10-26 PROCEDURE — 99396 PREV VISIT EST AGE 40-64: CPT | Mod: S$PBB,,, | Performed by: ADVANCED PRACTICE MIDWIFE

## 2023-10-26 PROCEDURE — 4010F ACE/ARB THERAPY RXD/TAKEN: CPT | Mod: CPTII,,, | Performed by: ADVANCED PRACTICE MIDWIFE

## 2023-10-26 PROCEDURE — 88142 CYTOPATH C/V THIN LAYER: CPT | Mod: TC,GCY | Performed by: ADVANCED PRACTICE MIDWIFE

## 2023-10-26 PROCEDURE — 88141 CYTOPATH C/V INTERPRET: CPT | Mod: ,,, | Performed by: PATHOLOGY

## 2023-10-26 PROCEDURE — 4010F PR ACE/ARB THEARPY RXD/TAKEN: ICD-10-PCS | Mod: CPTII,,, | Performed by: ADVANCED PRACTICE MIDWIFE

## 2023-10-26 PROCEDURE — 3074F SYST BP LT 130 MM HG: CPT | Mod: CPTII,,, | Performed by: ADVANCED PRACTICE MIDWIFE

## 2023-10-26 PROCEDURE — 1159F MED LIST DOCD IN RCRD: CPT | Mod: CPTII,,, | Performed by: ADVANCED PRACTICE MIDWIFE

## 2023-10-26 PROCEDURE — 99215 OFFICE O/P EST HI 40 MIN: CPT | Mod: PBBFAC | Performed by: ADVANCED PRACTICE MIDWIFE

## 2023-10-26 PROCEDURE — 87624 HPV HI-RISK TYP POOLED RSLT: CPT | Mod: ,,, | Performed by: CLINICAL MEDICAL LABORATORY

## 2023-10-26 PROCEDURE — 1159F PR MEDICATION LIST DOCUMENTED IN MEDICAL RECORD: ICD-10-PCS | Mod: CPTII,,, | Performed by: ADVANCED PRACTICE MIDWIFE

## 2023-10-26 PROCEDURE — 3079F DIAST BP 80-89 MM HG: CPT | Mod: CPTII,,, | Performed by: ADVANCED PRACTICE MIDWIFE

## 2023-10-26 PROCEDURE — 3074F PR MOST RECENT SYSTOLIC BLOOD PRESSURE < 130 MM HG: ICD-10-PCS | Mod: CPTII,,, | Performed by: ADVANCED PRACTICE MIDWIFE

## 2023-10-26 PROCEDURE — 99396 PR PREVENTIVE VISIT,EST,40-64: ICD-10-PCS | Mod: S$PBB,,, | Performed by: ADVANCED PRACTICE MIDWIFE

## 2023-10-26 PROCEDURE — 87624 HUMAN PAPILLOMAVIRUS (HPV): ICD-10-PCS | Mod: ,,, | Performed by: CLINICAL MEDICAL LABORATORY

## 2023-10-26 PROCEDURE — 3008F PR BODY MASS INDEX (BMI) DOCUMENTED: ICD-10-PCS | Mod: CPTII,,, | Performed by: ADVANCED PRACTICE MIDWIFE

## 2023-10-26 PROCEDURE — 3079F PR MOST RECENT DIASTOLIC BLOOD PRESSURE 80-89 MM HG: ICD-10-PCS | Mod: CPTII,,, | Performed by: ADVANCED PRACTICE MIDWIFE

## 2023-10-26 NOTE — PROGRESS NOTES
Subjective     Patient ID: Maureen Easley is a 63 y.o. female.    Chief Complaint: Gynecologic Exam (In for Annual Exam. Denies any problems at this time. Unsure when last pap was.)    Park'd for MMG in November.  UTD on colonoscopy.  Pap collected today.  Denies any problems or complaints.      Gynecologic Exam      Review of Systems   Constitutional: Negative.    HENT: Negative.     Eyes: Negative.    Respiratory: Negative.     Cardiovascular: Negative.    Gastrointestinal: Negative.    Endocrine: Negative.    Genitourinary: Negative.    Musculoskeletal: Negative.    Integumentary:  Negative.   Allergic/Immunologic: Negative.    Neurological: Negative.    Psychiatric/Behavioral: Negative.            Objective     Physical Exam  Vitals reviewed.   Constitutional:       Appearance: Normal appearance.   HENT:      Head: Normocephalic.   Cardiovascular:      Rate and Rhythm: Normal rate and regular rhythm.   Pulmonary:      Effort: Pulmonary effort is normal.      Breath sounds: Normal breath sounds.   Chest:   Breasts:     Right: Normal. No mass.      Left: Normal. No mass.   Abdominal:      General: Abdomen is flat.      Palpations: Abdomen is soft.   Genitourinary:     General: Normal vulva.      Exam position: Lithotomy position.      Vagina: Normal.      Uterus: Absent. Not tender.       Adnexa: Right adnexa normal and left adnexa normal.        Right: No mass.          Left: No mass.        Rectum: Normal. No mass.   Musculoskeletal:         General: Normal range of motion.      Cervical back: Normal range of motion.   Skin:     General: Skin is warm and dry.   Neurological:      Mental Status: She is alert and oriented to person, place, and time.   Psychiatric:         Mood and Affect: Mood normal.         Behavior: Behavior normal.            Assessment and Plan     1. Normal gynecologic examination    2. Screening for cervical cancer  -     ThinPrep Pap Test  -     HPV DNA probe, amplified    3. Atrophic  vaginitis  -     estradioL (ESTRACE) 0.01 % (0.1 mg/gram) vaginal cream; Place 1 g vaginally twice a week.  Dispense: 42.5 g; Refill: 11        Follow up 1 year or PRN  RX Estrace sent to [facility]         Follow up if symptoms worsen or fail to improve.

## 2023-10-30 ENCOUNTER — OFFICE VISIT (OUTPATIENT)
Dept: FAMILY MEDICINE | Facility: CLINIC | Age: 63
End: 2023-10-30
Payer: MEDICARE

## 2023-10-30 VITALS — SYSTOLIC BLOOD PRESSURE: 128 MMHG | DIASTOLIC BLOOD PRESSURE: 78 MMHG

## 2023-10-30 DIAGNOSIS — I10 ESSENTIAL HYPERTENSION: Primary | ICD-10-CM

## 2023-10-30 LAB
GH SERPL-MCNC: ABNORMAL NG/ML
INSULIN SERPL-ACNC: ABNORMAL U[IU]/ML
LAB AP CLINICAL INFORMATION: ABNORMAL
LAB AP GYN INTERPRETATION: ABNORMAL
LAB AP PAP DISCLAIMER COMMENTS: ABNORMAL
RENIN PLAS-CCNC: ABNORMAL NG/ML/H

## 2023-10-30 PROCEDURE — 3078F DIAST BP <80 MM HG: CPT | Mod: ,,, | Performed by: NURSE PRACTITIONER

## 2023-10-30 PROCEDURE — 99213 OFFICE O/P EST LOW 20 MIN: CPT | Mod: ,,, | Performed by: NURSE PRACTITIONER

## 2023-10-30 PROCEDURE — 3074F SYST BP LT 130 MM HG: CPT | Mod: ,,, | Performed by: NURSE PRACTITIONER

## 2023-10-30 PROCEDURE — 4010F ACE/ARB THERAPY RXD/TAKEN: CPT | Mod: ,,, | Performed by: NURSE PRACTITIONER

## 2023-11-01 LAB
HPV 16: NEGATIVE
HPV 18: NEGATIVE
HPV OTHER: NEGATIVE

## 2023-11-02 RX ORDER — ESTRADIOL 0.1 MG/G
1 CREAM VAGINAL
Qty: 42.5 G | Refills: 11 | Status: SHIPPED | OUTPATIENT
Start: 2023-11-02 | End: 2024-11-01

## 2023-11-20 LAB — HM MAMMOGRAM: NORMAL

## 2023-11-22 ENCOUNTER — TELEPHONE (OUTPATIENT)
Dept: FAMILY MEDICINE | Facility: CLINIC | Age: 63
End: 2023-11-22
Payer: MEDICARE

## 2023-12-11 ENCOUNTER — PATIENT MESSAGE (OUTPATIENT)
Dept: ADMINISTRATIVE | Facility: HOSPITAL | Age: 63
End: 2023-12-11

## 2023-12-15 ENCOUNTER — OFFICE VISIT (OUTPATIENT)
Dept: FAMILY MEDICINE | Facility: CLINIC | Age: 63
End: 2023-12-15
Payer: MEDICARE

## 2023-12-15 VITALS
HEIGHT: 66 IN | RESPIRATION RATE: 17 BRPM | HEART RATE: 74 BPM | SYSTOLIC BLOOD PRESSURE: 143 MMHG | BODY MASS INDEX: 37.93 KG/M2 | DIASTOLIC BLOOD PRESSURE: 83 MMHG | WEIGHT: 236 LBS | OXYGEN SATURATION: 96 %

## 2023-12-15 DIAGNOSIS — R05.9 COUGH, UNSPECIFIED TYPE: ICD-10-CM

## 2023-12-15 DIAGNOSIS — J06.9 ACUTE UPPER RESPIRATORY INFECTION: Primary | ICD-10-CM

## 2023-12-15 LAB
CTP QC/QA: YES
CTP QC/QA: YES
FLUAV AG NPH QL: NEGATIVE
FLUBV AG NPH QL: NEGATIVE
SARS-COV-2 AG RESP QL IA.RAPID: NEGATIVE

## 2023-12-15 PROCEDURE — 3077F SYST BP >= 140 MM HG: CPT | Mod: ,,, | Performed by: NURSE PRACTITIONER

## 2023-12-15 PROCEDURE — 4010F ACE/ARB THERAPY RXD/TAKEN: CPT | Mod: ,,, | Performed by: NURSE PRACTITIONER

## 2023-12-15 PROCEDURE — 3008F PR BODY MASS INDEX (BMI) DOCUMENTED: ICD-10-PCS | Mod: ,,, | Performed by: NURSE PRACTITIONER

## 2023-12-15 PROCEDURE — 99213 PR OFFICE/OUTPT VISIT, EST, LEVL III, 20-29 MIN: ICD-10-PCS | Mod: ,,, | Performed by: NURSE PRACTITIONER

## 2023-12-15 PROCEDURE — 1159F MED LIST DOCD IN RCRD: CPT | Mod: ,,, | Performed by: NURSE PRACTITIONER

## 2023-12-15 PROCEDURE — 3079F DIAST BP 80-89 MM HG: CPT | Mod: ,,, | Performed by: NURSE PRACTITIONER

## 2023-12-15 PROCEDURE — 87804 INFLUENZA ASSAY W/OPTIC: CPT | Mod: 59,QW,, | Performed by: NURSE PRACTITIONER

## 2023-12-15 PROCEDURE — 87426 SARS CORONAVIRUS 2 ANTIGEN POCT: ICD-10-PCS | Mod: QW,,, | Performed by: NURSE PRACTITIONER

## 2023-12-15 PROCEDURE — 87804 POCT INFLUENZA A/B: ICD-10-PCS | Mod: 59,QW,, | Performed by: NURSE PRACTITIONER

## 2023-12-15 PROCEDURE — 3077F PR MOST RECENT SYSTOLIC BLOOD PRESSURE >= 140 MM HG: ICD-10-PCS | Mod: ,,, | Performed by: NURSE PRACTITIONER

## 2023-12-15 PROCEDURE — 4010F PR ACE/ARB THEARPY RXD/TAKEN: ICD-10-PCS | Mod: ,,, | Performed by: NURSE PRACTITIONER

## 2023-12-15 PROCEDURE — 1159F PR MEDICATION LIST DOCUMENTED IN MEDICAL RECORD: ICD-10-PCS | Mod: ,,, | Performed by: NURSE PRACTITIONER

## 2023-12-15 PROCEDURE — 3008F BODY MASS INDEX DOCD: CPT | Mod: ,,, | Performed by: NURSE PRACTITIONER

## 2023-12-15 PROCEDURE — 99213 OFFICE O/P EST LOW 20 MIN: CPT | Mod: ,,, | Performed by: NURSE PRACTITIONER

## 2023-12-15 PROCEDURE — 87426 SARSCOV CORONAVIRUS AG IA: CPT | Mod: QW,,, | Performed by: NURSE PRACTITIONER

## 2023-12-15 PROCEDURE — 3079F PR MOST RECENT DIASTOLIC BLOOD PRESSURE 80-89 MM HG: ICD-10-PCS | Mod: ,,, | Performed by: NURSE PRACTITIONER

## 2023-12-15 RX ORDER — DOXYCYCLINE HYCLATE 100 MG
100 TABLET ORAL EVERY 12 HOURS
Qty: 14 TABLET | Refills: 0 | Status: SHIPPED | OUTPATIENT
Start: 2023-12-15 | End: 2024-04-03 | Stop reason: ALTCHOICE

## 2023-12-15 RX ORDER — PREDNISONE 20 MG/1
40 TABLET ORAL DAILY
Qty: 10 TABLET | Refills: 0 | Status: SHIPPED | OUTPATIENT
Start: 2023-12-15 | End: 2023-12-20

## 2023-12-15 NOTE — PROGRESS NOTES
"Subjective:       Patient ID: Maureen Easley is a 63 y.o. female.    Vitals:  height is 5' 6" (1.676 m) and weight is 107 kg (236 lb). Her blood pressure is 142/83 (abnormal, pended) and her pulse is 74. Her respiration is 17 and oxygen saturation is 96%.     Chief Complaint: Cough (Symptoms x Tuesday ), Sore Throat, Headache, and Nasal Congestion    URI   This is a new problem. Episode onset: 3 days ago. The problem has been gradually worsening. There has been no fever. Associated symptoms include congestion, coughing, ear pain (left), headaches (around eyes), nausea, a plugged ear sensation, sneezing and a sore throat. Pertinent negatives include no rhinorrhea, sinus pain or vomiting. She has tried antihistamine for the symptoms. The treatment provided mild relief.       Constitution: Positive for fatigue. Negative for fever.   HENT:  Positive for ear pain (left), congestion, postnasal drip, sinus pressure and sore throat. Negative for sinus pain.    Respiratory:  Positive for cough and sputum production (yellow-tinged).    Gastrointestinal:  Positive for nausea. Negative for vomiting.   Allergic/Immunologic: Positive for environmental allergies, seasonal allergies and sneezing.   Neurological:  Positive for headaches (around eyes).         Objective:      Physical Exam  Vitals reviewed.   Constitutional:       Appearance: Normal appearance.   HENT:      Head: Normocephalic and atraumatic.      Right Ear: Hearing normal. No middle ear effusion. Tympanic membrane is not erythematous or bulging.      Left Ear: Hearing normal.  No middle ear effusion. Tympanic membrane is not erythematous or bulging.      Nose: Congestion present. No rhinorrhea.      Right Turbinates: Not swollen.      Left Turbinates: Not swollen.      Mouth/Throat:      Mouth: Mucous membranes are moist.      Pharynx: No pharyngeal swelling or posterior oropharyngeal erythema.      Tonsils: No tonsillar exudate.   Eyes:      Conjunctiva/sclera: " Conjunctivae normal.   Cardiovascular:      Rate and Rhythm: Normal rate and regular rhythm.   Pulmonary:      Effort: No respiratory distress.      Breath sounds: Normal breath sounds.   Neurological:      Mental Status: She is alert and oriented to person, place, and time.   Psychiatric:         Mood and Affect: Mood normal.         Behavior: Behavior normal.         Thought Content: Thought content normal.         Judgment: Judgment normal.        Assessment:       1. Acute upper respiratory infection    2. Cough, unspecified type          Recent Results (from the past 24 hour(s))   SARS Coronavirus 2 Antigen, POCT    Collection Time: 12/15/23  8:10 AM   Result Value Ref Range    SARS Coronavirus 2 Antigen Negative Negative     Acceptable Yes    POCT Influenza A/B    Collection Time: 12/15/23  8:12 AM   Result Value Ref Range    Rapid Influenza A Ag Negative Negative    Rapid Influenza B Ag Negative Negative     Acceptable Yes         Plan:         Acute upper respiratory infection  -     doxycycline (VIBRA-TABS) 100 MG tablet; Take 1 tablet (100 mg total) by mouth every 12 (twelve) hours.  Dispense: 14 tablet; Refill: 0  -     predniSONE (DELTASONE) 20 MG tablet; Take 2 tablets (40 mg total) by mouth once daily. for 5 days  Dispense: 10 tablet; Refill: 0    Cough, unspecified type  -     SARS Coronavirus 2 Antigen, POCT  -     POCT Influenza A/B      Follow up if symptoms worsen or fail to improve.     Future Appointments   Date Time Provider Department Center   1/8/2024  9:45 AM Za Moffett MD [facility]   4/1/2024 10:20 AM Bhakti De Los Santos FNP [facility] JOAN Vazquez After Visit Summary was printed and given to the patient.     Signature:    GAYLE Lawrence  Family Nurse Practitioner  [facility]    Date of encounter: 12/15/23

## 2024-01-08 ENCOUNTER — OFFICE VISIT (OUTPATIENT)
Dept: DERMATOLOGY | Facility: CLINIC | Age: 64
End: 2024-01-08
Payer: MEDICARE

## 2024-01-08 VITALS — RESPIRATION RATE: 18 BRPM | BODY MASS INDEX: 37.93 KG/M2 | WEIGHT: 236 LBS | HEIGHT: 66 IN

## 2024-01-08 DIAGNOSIS — L71.9 ROSACEA: ICD-10-CM

## 2024-01-08 DIAGNOSIS — L71.9 ROSACEA: Primary | ICD-10-CM

## 2024-01-08 PROCEDURE — 3008F BODY MASS INDEX DOCD: CPT | Mod: CPTII,,, | Performed by: DERMATOLOGY

## 2024-01-08 PROCEDURE — 1160F RVW MEDS BY RX/DR IN RCRD: CPT | Mod: CPTII,,, | Performed by: DERMATOLOGY

## 2024-01-08 PROCEDURE — 99213 OFFICE O/P EST LOW 20 MIN: CPT | Mod: ,,, | Performed by: DERMATOLOGY

## 2024-01-08 PROCEDURE — 1159F MED LIST DOCD IN RCRD: CPT | Mod: CPTII,,, | Performed by: DERMATOLOGY

## 2024-01-08 RX ORDER — METRONIDAZOLE 7.5 MG/G
GEL TOPICAL 2 TIMES DAILY
Qty: 45 G | Refills: 5 | Status: SHIPPED | OUTPATIENT
Start: 2024-01-08 | End: 2025-01-07

## 2024-01-08 NOTE — PROGRESS NOTES
[facility]   Za Moffett MD    Patient Name: Maureen Easley  Patient YOB: 1960   Date of Service: 1/8/24    CC: Follow-up Rosacea     HPI: Maureen Easley is a 63 y.o. female here today for follow-up of rosacea, last seen 1/5/23.  Previous treatments include MetroGel.  Overall, the Rosacea is improved.  Treatment plan was followed as directed. Patient declines FSE at today's visit.     Past Medical History:   Diagnosis Date    Bipolar disorder     Depression     Hyperlipidemia     Hypertension      Past Surgical History:   Procedure Laterality Date    COLONOSCOPY  08/21/2019    hx polyps    HYSTERECTOMY  1998     Review of patient's allergies indicates:   Allergen Reactions    Sulfamethoxazole-trimethoprim      Other reaction(s): Unknown    Clarithromycin Rash       Current Outpatient Medications:     albuterol (PROVENTIL/VENTOLIN HFA) 90 mcg/actuation inhaler, Inhale 2 puffs into the lungs every 4 (four) hours as needed., Disp: , Rfl:     ARIPiprazole (ABILIFY) 10 MG Tab, Take 10 mg by mouth every morning., Disp: , Rfl:     aspirin (ECOTRIN) 81 MG EC tablet, 1 tablet., Disp: , Rfl:     atorvastatin (LIPITOR) 10 MG tablet, Take 1 tablet (10 mg total) by mouth every evening., Disp: 90 tablet, Rfl: 1    doxycycline (VIBRA-TABS) 100 MG tablet, Take 1 tablet (100 mg total) by mouth every 12 (twelve) hours., Disp: 14 tablet, Rfl: 0    DULoxetine (CYMBALTA) 30 MG capsule, Take 30 mg by mouth., Disp: , Rfl:     esomeprazole (NEXIUM) 40 MG capsule, Take 1 capsule (40 mg total) by mouth before breakfast., Disp: 90 capsule, Rfl: 1    estradioL (ESTRACE) 0.01 % (0.1 mg/gram) vaginal cream, Place 1 g vaginally twice a week., Disp: 42.5 g, Rfl: 11    fexofenadine (ALLEGRA ALLERGY) 180 MG tablet, Take 1 tablet (180 mg total) by mouth once daily., Disp: 90 tablet, Rfl: 0    fluticasone propionate (FLONASE) 50 mcg/actuation nasal spray, SPRAY 2 SPRAYS INTO EACH NOSTRIL EVERY DAY, Disp: 48 mL, Rfl: 0     gabapentin (NEURONTIN) 300 MG capsule, Take 1 capsule (300 mg total) by mouth 3 (three) times daily., Disp: 90 capsule, Rfl: 11    HYDROcodone-acetaminophen (NORCO) 5-325 mg per tablet, , Disp: , Rfl:     lisinopriL 10 MG tablet, Take 1 tablet (10 mg total) by mouth once daily., Disp: 90 tablet, Rfl: 1    metroNIDAZOLE (METROGEL) 0.75 % gel, Apply topically 2 (two) times daily., Disp: 45 g, Rfl: 5    mirtazapine (REMERON) 15 MG tablet, Take 7.5 mg by mouth., Disp: , Rfl:     montelukast (SINGULAIR) 10 mg tablet, Take 1 tablet (10 mg total) by mouth every evening., Disp: 90 tablet, Rfl: 1    pramipexole (MIRAPEX) 0.5 MG tablet, Take 1 tablet (0.5 mg total) by mouth every evening., Disp: 90 tablet, Rfl: 1    traZODone (DESYREL) 100 MG tablet, TAKE 1 TABLET BY MOUTH EVERY DAY AT BEDTIME AS DIRECTED, Disp: , Rfl:     ROS: A focused review of systems was obtained and negative.     Exam: A focused skin exam was performed. All areas examined were normal except as mentioned in the assessment and plan below.  General Appearance of the patient is well developed and well nourished.  Orientation: alert and oriented x 3.  Mood and affect: pleasant.    Assessment:   The primary encounter diagnosis was Rosacea. A diagnosis of Rosacea was also pertinent to this visit.    Plan:   Medications Ordered This Encounter   Medications    metroNIDAZOLE (METROGEL) 0.75 % gel     Sig: Apply topically 2 (two) times daily.     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  I counseled the patient regarding the following:  Contact office if: Rosacea worsens or fails to improve despite months of treatment; patient develops nodules or  cysts.  - Continue MetroGel BID for maintenance       Follow up in about 1 year (around 1/8/2025) for Rosacea .    Za Moffett MD

## 2024-01-09 DIAGNOSIS — Z71.89 COMPLEX CARE COORDINATION: ICD-10-CM

## 2024-02-12 DIAGNOSIS — I10 ESSENTIAL HYPERTENSION: ICD-10-CM

## 2024-02-19 RX ORDER — LISINOPRIL 10 MG/1
10 TABLET ORAL DAILY
Qty: 90 TABLET | Refills: 3 | Status: SHIPPED | OUTPATIENT
Start: 2024-02-19 | End: 2024-04-03

## 2024-03-07 DIAGNOSIS — K21.9 GASTROESOPHAGEAL REFLUX DISEASE, UNSPECIFIED WHETHER ESOPHAGITIS PRESENT: ICD-10-CM

## 2024-03-09 RX ORDER — ESOMEPRAZOLE MAGNESIUM 40 MG/1
40 CAPSULE, DELAYED RELEASE ORAL
Qty: 90 CAPSULE | Refills: 0 | Status: SHIPPED | OUTPATIENT
Start: 2024-03-09 | End: 2024-04-03 | Stop reason: SDUPTHER

## 2024-03-12 NOTE — PROGRESS NOTES
128/78 came in for bp check only was told to keep taking meds and follow up with provider and previously told

## 2024-03-24 ENCOUNTER — HOSPITAL ENCOUNTER (EMERGENCY)
Facility: HOSPITAL | Age: 64
Discharge: HOME | End: 2024-03-24
Payer: MEDICARE

## 2024-03-24 VITALS
BODY MASS INDEX: 38.57 KG/M2 | HEART RATE: 80 BPM | SYSTOLIC BLOOD PRESSURE: 142 MMHG | DIASTOLIC BLOOD PRESSURE: 65 MMHG | OXYGEN SATURATION: 94 % | WEIGHT: 240 LBS | RESPIRATION RATE: 19 BRPM | TEMPERATURE: 98 F | HEIGHT: 66 IN

## 2024-03-24 DIAGNOSIS — R07.9 CHEST PAIN: ICD-10-CM

## 2024-03-24 DIAGNOSIS — R07.89 CHEST WALL PAIN: Primary | ICD-10-CM

## 2024-03-24 DIAGNOSIS — R07.9 CHEST PAIN, UNSPECIFIED TYPE: ICD-10-CM

## 2024-03-24 LAB
ANION GAP SERPL CALCULATED.3IONS-SCNC: 9 MMOL/L (ref 7–16)
BASOPHILS # BLD AUTO: 0.04 K/UL (ref 0–0.2)
BASOPHILS NFR BLD AUTO: 0.5 % (ref 0–1)
BILIRUB UR QL STRIP: NEGATIVE
BUN SERPL-MCNC: 15 MG/DL (ref 7–18)
BUN/CREAT SERPL: 19 (ref 6–20)
CALCIUM SERPL-MCNC: 9.1 MG/DL (ref 8.5–10.1)
CHLORIDE SERPL-SCNC: 106 MMOL/L (ref 98–107)
CLARITY UR: CLEAR
CO2 SERPL-SCNC: 29 MMOL/L (ref 21–32)
COLOR UR: COLORLESS
CREAT SERPL-MCNC: 0.8 MG/DL (ref 0.55–1.02)
DIFFERENTIAL METHOD BLD: ABNORMAL
EGFR (NO RACE VARIABLE) (RUSH/TITUS): 82 ML/MIN/1.73M2
EOSINOPHIL # BLD AUTO: 0.07 K/UL (ref 0–0.5)
EOSINOPHIL NFR BLD AUTO: 0.9 % (ref 1–4)
ERYTHROCYTE [DISTWIDTH] IN BLOOD BY AUTOMATED COUNT: 15 % (ref 11.5–14.5)
GLUCOSE SERPL-MCNC: 133 MG/DL (ref 74–106)
GLUCOSE UR STRIP-MCNC: NORMAL MG/DL
HCT VFR BLD AUTO: 36.9 % (ref 38–47)
HGB BLD-MCNC: 11.8 G/DL (ref 12–16)
IMM GRANULOCYTES # BLD AUTO: 0.04 K/UL (ref 0–0.04)
IMM GRANULOCYTES NFR BLD: 0.5 % (ref 0–0.4)
KETONES UR STRIP-SCNC: NEGATIVE MG/DL
LEUKOCYTE ESTERASE UR QL STRIP: NEGATIVE
LYMPHOCYTES # BLD AUTO: 2.92 K/UL (ref 1–4.8)
LYMPHOCYTES NFR BLD AUTO: 39.6 % (ref 27–41)
MCH RBC QN AUTO: 27 PG (ref 27–31)
MCHC RBC AUTO-ENTMCNC: 32 G/DL (ref 32–36)
MCV RBC AUTO: 84.4 FL (ref 80–96)
MONOCYTES # BLD AUTO: 0.57 K/UL (ref 0–0.8)
MONOCYTES NFR BLD AUTO: 7.7 % (ref 2–6)
MPC BLD CALC-MCNC: 11.3 FL (ref 9.4–12.4)
NEUTROPHILS # BLD AUTO: 3.73 K/UL (ref 1.8–7.7)
NEUTROPHILS NFR BLD AUTO: 50.8 % (ref 53–65)
NITRITE UR QL STRIP: NEGATIVE
NRBC # BLD AUTO: 0 X10E3/UL
NRBC, AUTO (.00): 0 %
PH UR STRIP: 5 PH UNITS
PLATELET # BLD AUTO: 245 K/UL (ref 150–400)
POTASSIUM SERPL-SCNC: 3.8 MMOL/L (ref 3.5–5.1)
PROT UR QL STRIP: NEGATIVE
RBC # BLD AUTO: 4.37 M/UL (ref 4.2–5.4)
RBC # UR STRIP: NEGATIVE /UL
SODIUM SERPL-SCNC: 140 MMOL/L (ref 136–145)
SP GR UR STRIP: 1.01
TROPONIN I SERPL DL<=0.01 NG/ML-MCNC: <4 PG/ML
UROBILINOGEN UR STRIP-ACNC: NORMAL MG/DL
WBC # BLD AUTO: 7.37 K/UL (ref 4.5–11)

## 2024-03-24 PROCEDURE — 93005 ELECTROCARDIOGRAM TRACING: CPT

## 2024-03-24 PROCEDURE — 85025 COMPLETE CBC W/AUTO DIFF WBC: CPT | Performed by: NURSE PRACTITIONER

## 2024-03-24 PROCEDURE — 80048 BASIC METABOLIC PNL TOTAL CA: CPT | Performed by: NURSE PRACTITIONER

## 2024-03-24 PROCEDURE — 84484 ASSAY OF TROPONIN QUANT: CPT | Performed by: NURSE PRACTITIONER

## 2024-03-24 PROCEDURE — 99284 EMERGENCY DEPT VISIT MOD MDM: CPT | Mod: ,,, | Performed by: NURSE PRACTITIONER

## 2024-03-24 PROCEDURE — 81003 URINALYSIS AUTO W/O SCOPE: CPT | Performed by: NURSE PRACTITIONER

## 2024-03-24 PROCEDURE — 93010 ELECTROCARDIOGRAM REPORT: CPT | Mod: ,,, | Performed by: INTERNAL MEDICINE

## 2024-03-24 PROCEDURE — 99285 EMERGENCY DEPT VISIT HI MDM: CPT | Mod: 25

## 2024-03-24 RX ORDER — NAPROXEN 375 MG/1
375 TABLET ORAL 2 TIMES DAILY PRN
Qty: 60 TABLET | Refills: 0 | Status: SHIPPED | OUTPATIENT
Start: 2024-03-24

## 2024-03-25 ENCOUNTER — TELEPHONE (OUTPATIENT)
Dept: EMERGENCY MEDICINE | Facility: HOSPITAL | Age: 64
End: 2024-03-25
Payer: MEDICARE

## 2024-03-27 DIAGNOSIS — E78.5 HYPERLIPIDEMIA, UNSPECIFIED HYPERLIPIDEMIA TYPE: ICD-10-CM

## 2024-03-27 RX ORDER — MONTELUKAST SODIUM 10 MG/1
10 TABLET ORAL NIGHTLY
Qty: 90 TABLET | Refills: 3 | Status: SHIPPED | OUTPATIENT
Start: 2024-03-27

## 2024-03-27 RX ORDER — ATORVASTATIN CALCIUM 10 MG/1
10 TABLET, FILM COATED ORAL NIGHTLY
Qty: 30 TABLET | Refills: 0 | Status: SHIPPED | OUTPATIENT
Start: 2024-03-27 | End: 2024-04-03 | Stop reason: SDUPTHER

## 2024-03-27 RX ORDER — PRAMIPEXOLE DIHYDROCHLORIDE 0.5 MG/1
0.5 TABLET ORAL NIGHTLY
Qty: 90 TABLET | Refills: 3 | OUTPATIENT
Start: 2024-03-27

## 2024-03-27 NOTE — TELEPHONE ENCOUNTER
Call pt and ask what she's being prescribed Mirapex for and who was the original prescriber.  Also advise that I can give her a 30 day supply only of her atorvastatin.  She needs labs.

## 2024-04-03 ENCOUNTER — OFFICE VISIT (OUTPATIENT)
Dept: FAMILY MEDICINE | Facility: CLINIC | Age: 64
End: 2024-04-03
Payer: MEDICARE

## 2024-04-03 VITALS
HEIGHT: 66 IN | OXYGEN SATURATION: 96 % | RESPIRATION RATE: 16 BRPM | HEART RATE: 82 BPM | BODY MASS INDEX: 37.28 KG/M2 | WEIGHT: 232 LBS | TEMPERATURE: 98 F | SYSTOLIC BLOOD PRESSURE: 142 MMHG | DIASTOLIC BLOOD PRESSURE: 80 MMHG

## 2024-04-03 DIAGNOSIS — G89.29 CHRONIC PAIN OF RIGHT KNEE: ICD-10-CM

## 2024-04-03 DIAGNOSIS — E66.01 SEVERE OBESITY (BMI 35.0-39.9) WITH COMORBIDITY: ICD-10-CM

## 2024-04-03 DIAGNOSIS — Z11.59 ENCOUNTER FOR HEPATITIS C SCREENING TEST FOR LOW RISK PATIENT: ICD-10-CM

## 2024-04-03 DIAGNOSIS — J30.9 ALLERGIC RHINITIS, UNSPECIFIED SEASONALITY, UNSPECIFIED TRIGGER: ICD-10-CM

## 2024-04-03 DIAGNOSIS — M25.561 CHRONIC PAIN OF RIGHT KNEE: ICD-10-CM

## 2024-04-03 DIAGNOSIS — Z13.29 SCREENING FOR THYROID DISORDER: ICD-10-CM

## 2024-04-03 DIAGNOSIS — Z11.4 SCREENING FOR HIV WITHOUT PRESENCE OF RISK FACTORS: ICD-10-CM

## 2024-04-03 DIAGNOSIS — I10 UNCONTROLLED HYPERTENSION: Primary | ICD-10-CM

## 2024-04-03 DIAGNOSIS — Z12.31 SCREENING MAMMOGRAM FOR BREAST CANCER: ICD-10-CM

## 2024-04-03 DIAGNOSIS — K21.9 GASTROESOPHAGEAL REFLUX DISEASE, UNSPECIFIED WHETHER ESOPHAGITIS PRESENT: ICD-10-CM

## 2024-04-03 DIAGNOSIS — R73.03 PREDIABETES: ICD-10-CM

## 2024-04-03 DIAGNOSIS — R00.2 PALPITATIONS: ICD-10-CM

## 2024-04-03 DIAGNOSIS — E78.5 HYPERLIPIDEMIA, UNSPECIFIED HYPERLIPIDEMIA TYPE: ICD-10-CM

## 2024-04-03 LAB
CHOLEST SERPL-MCNC: 208 MG/DL (ref 0–200)
CHOLEST/HDLC SERPL: 3.7 {RATIO}
EST. AVERAGE GLUCOSE BLD GHB EST-MCNC: 128 MG/DL
HBA1C MFR BLD HPLC: 6.1 % (ref 4.5–6.6)
HDLC SERPL-MCNC: 56 MG/DL (ref 40–60)
LDLC SERPL CALC-MCNC: 108 MG/DL
LDLC/HDLC SERPL: 1.9 {RATIO}
NONHDLC SERPL-MCNC: 152 MG/DL
T4 FREE SERPL-MCNC: 1 NG/DL (ref 0.76–1.46)
TRIGL SERPL-MCNC: 222 MG/DL (ref 35–150)
TSH SERPL DL<=0.005 MIU/L-ACNC: 1.72 UIU/ML (ref 0.36–3.74)
VLDLC SERPL-MCNC: 44 MG/DL

## 2024-04-03 PROCEDURE — 3077F SYST BP >= 140 MM HG: CPT | Mod: ,,, | Performed by: STUDENT IN AN ORGANIZED HEALTH CARE EDUCATION/TRAINING PROGRAM

## 2024-04-03 PROCEDURE — 84439 ASSAY OF FREE THYROXINE: CPT | Mod: ,,, | Performed by: CLINICAL MEDICAL LABORATORY

## 2024-04-03 PROCEDURE — 4010F ACE/ARB THERAPY RXD/TAKEN: CPT | Mod: ,,, | Performed by: STUDENT IN AN ORGANIZED HEALTH CARE EDUCATION/TRAINING PROGRAM

## 2024-04-03 PROCEDURE — 1159F MED LIST DOCD IN RCRD: CPT | Mod: ,,, | Performed by: STUDENT IN AN ORGANIZED HEALTH CARE EDUCATION/TRAINING PROGRAM

## 2024-04-03 PROCEDURE — 83036 HEMOGLOBIN GLYCOSYLATED A1C: CPT | Mod: ,,, | Performed by: CLINICAL MEDICAL LABORATORY

## 2024-04-03 PROCEDURE — 3044F HG A1C LEVEL LT 7.0%: CPT | Mod: ,,, | Performed by: STUDENT IN AN ORGANIZED HEALTH CARE EDUCATION/TRAINING PROGRAM

## 2024-04-03 PROCEDURE — 3079F DIAST BP 80-89 MM HG: CPT | Mod: ,,, | Performed by: STUDENT IN AN ORGANIZED HEALTH CARE EDUCATION/TRAINING PROGRAM

## 2024-04-03 PROCEDURE — 84443 ASSAY THYROID STIM HORMONE: CPT | Mod: ,,, | Performed by: CLINICAL MEDICAL LABORATORY

## 2024-04-03 PROCEDURE — 3008F BODY MASS INDEX DOCD: CPT | Mod: ,,, | Performed by: STUDENT IN AN ORGANIZED HEALTH CARE EDUCATION/TRAINING PROGRAM

## 2024-04-03 PROCEDURE — 87389 HIV-1 AG W/HIV-1&-2 AB AG IA: CPT | Mod: ,,, | Performed by: CLINICAL MEDICAL LABORATORY

## 2024-04-03 PROCEDURE — 99214 OFFICE O/P EST MOD 30 MIN: CPT | Mod: ,,, | Performed by: STUDENT IN AN ORGANIZED HEALTH CARE EDUCATION/TRAINING PROGRAM

## 2024-04-03 PROCEDURE — 86803 HEPATITIS C AB TEST: CPT | Mod: ,,, | Performed by: CLINICAL MEDICAL LABORATORY

## 2024-04-03 PROCEDURE — 80061 LIPID PANEL: CPT | Mod: ,,, | Performed by: CLINICAL MEDICAL LABORATORY

## 2024-04-03 RX ORDER — ESOMEPRAZOLE MAGNESIUM 40 MG/1
40 CAPSULE, DELAYED RELEASE ORAL
Qty: 90 CAPSULE | Refills: 0 | Status: SHIPPED | OUTPATIENT
Start: 2024-04-03 | End: 2024-09-30

## 2024-04-03 RX ORDER — HYDROCHLOROTHIAZIDE 25 MG/1
25 TABLET ORAL DAILY
Qty: 90 TABLET | Refills: 0 | Status: SHIPPED | OUTPATIENT
Start: 2024-04-03 | End: 2025-04-03

## 2024-04-03 RX ORDER — LISINOPRIL 20 MG/1
20 TABLET ORAL DAILY
Qty: 90 TABLET | Refills: 0 | Status: SHIPPED | OUTPATIENT
Start: 2024-04-03

## 2024-04-03 RX ORDER — ATORVASTATIN CALCIUM 10 MG/1
10 TABLET, FILM COATED ORAL NIGHTLY
Qty: 90 TABLET | Refills: 1 | Status: SHIPPED | OUTPATIENT
Start: 2024-04-03

## 2024-04-03 RX ORDER — MINERAL OIL
180 ENEMA (ML) RECTAL DAILY
Qty: 90 TABLET | Refills: 0 | Status: SHIPPED | OUTPATIENT
Start: 2024-04-03 | End: 2025-04-03

## 2024-04-03 RX ORDER — CLONIDINE HYDROCHLORIDE 0.1 MG/1
0.1 TABLET ORAL
Status: COMPLETED | OUTPATIENT
Start: 2024-04-03 | End: 2024-04-03

## 2024-04-03 RX ORDER — HYDROCHLOROTHIAZIDE 25 MG/1
25 TABLET ORAL DAILY
Qty: 30 TABLET | Refills: 11 | Status: SHIPPED | OUTPATIENT
Start: 2024-04-03 | End: 2024-04-03

## 2024-04-03 RX ADMIN — CLONIDINE HYDROCHLORIDE 0.1 MG: 0.1 TABLET ORAL at 11:04

## 2024-04-03 NOTE — PROGRESS NOTES
[facility]    Chief Complaint      Chief Complaint   Patient presents with    Follow-up     5m follow up HTN       History of Present Illness      Maureen Easley is a 63 y.o. female with chronic conditions of obesity, htn, hld, depression, bipolar who presents today for htn follow up.  Blood pressure severely elevated today, clonidine x's 1 ordered.   Blood pressure running around 160/70.  \[facility]\"" has chronic right knee pain, saw dr. Mcfarlane about 6 years ago and had to have a scope. \[facility]\"" knee is now popping when she exercies and rides her bike, has been using icy hot and heat in addition to tylenol as needed for pain. Had mammogram last November at [facility]. Vaccines [address], [facility], records requested in wrap up.         Past Medical History:  Past Medical History:   Diagnosis Date    Bipolar disorder     Depression     Hyperlipidemia     Hypertension        Past Surgical History:   has a past surgical history that includes Colonoscopy (08/21/2019) and Hysterectomy (1998).    Social History:  Social History     Tobacco Use    Smoking status: Never    Smokeless tobacco: Never   Substance Use Topics    Alcohol use: Never    Drug use: Never       I personally reviewed all past medical, surgical, and social.     Review of Systems   Constitutional:  Negative for fatigue and fever.   HENT:  Negative for sore throat.    Respiratory:  Negative for shortness of breath.    Cardiovascular:  Negative for chest pain.   Gastrointestinal:  Negative for abdominal pain.   Genitourinary:  Negative for dysuria.   Musculoskeletal:  Negative for back pain.   Integumentary:  Negative for rash.   Neurological:  Negative for headaches.   All other systems reviewed and are negative.       Medications:  Outpatient Encounter Medications as of 4/3/2024   Medication Sig Dispense Refill    albuterol (PROVENTIL/VENTOLIN HFA) 90 mcg/actuation inhaler Inhale 2 puffs into the lungs every 4 (four) hours as needed.       ARIPiprazole (ABILIFY) 10 MG Tab Take 10 mg by mouth every morning.      aspirin (ECOTRIN) 81 MG EC tablet 1 tablet.      atorvastatin (LIPITOR) 10 MG tablet Take 1 tablet (10 mg total) by mouth every evening. 90 tablet 1    DULoxetine (CYMBALTA) 30 MG capsule Take 30 mg by mouth.      esomeprazole (NEXIUM) 40 MG capsule Take 1 capsule (40 mg total) by mouth before breakfast. 90 capsule 0    estradioL (ESTRACE) 0.01 % (0.1 mg/gram) vaginal cream Place 1 g vaginally twice a week. 42.5 g 11    fexofenadine (ALLEGRA ALLERGY) 180 MG tablet Take 1 tablet (180 mg total) by mouth once daily. 90 tablet 0    fluticasone propionate (FLONASE) 50 mcg/actuation nasal spray SPRAY 2 SPRAYS INTO EACH NOSTRIL EVERY DAY 48 mL 0    gabapentin (NEURONTIN) 300 MG capsule Take 1 capsule (300 mg total) by mouth 3 (three) times daily. 90 capsule 11    hydroCHLOROthiazide (HYDRODIURIL) 25 MG tablet Take 1 tablet (25 mg total) by mouth once daily. 90 tablet 0    HYDROcodone-acetaminophen (NORCO) 5-325 mg per tablet       lisinopriL (PRINIVIL,ZESTRIL) 20 MG tablet Take 1 tablet (20 mg total) by mouth once daily. 90 tablet 0    metroNIDAZOLE (METROGEL) 0.75 % gel Apply topically 2 (two) times daily. 45 g 5    mirtazapine (REMERON) 15 MG tablet Take 7.5 mg by mouth.      montelukast (SINGULAIR) 10 mg tablet TAKE 1 TABLET EVERY EVENING 90 tablet 3    naproxen (NAPROSYN) 375 MG tablet Take 1 tablet (375 mg total) by mouth 2 (two) times daily as needed. 60 tablet 0    pramipexole (MIRAPEX) 0.5 MG tablet Take 1 tablet (0.5 mg total) by mouth every evening. 90 tablet 1    traZODone (DESYREL) 100 MG tablet TAKE 1 TABLET BY MOUTH EVERY DAY AT BEDTIME AS DIRECTED      [DISCONTINUED] atorvastatin (LIPITOR) 10 MG tablet Take 1 tablet (10 mg total) by mouth every evening. 90 tablet 1    [DISCONTINUED] atorvastatin (LIPITOR) 10 MG tablet TAKE 1 TABLET EVERY EVENING 30 tablet 0    [DISCONTINUED] doxycycline (VIBRA-TABS) 100 MG tablet Take 1 tablet (100 mg  "total) by mouth every 12 (twelve) hours. 14 tablet 0    [DISCONTINUED] esomeprazole (NEXIUM) 40 MG capsule Take 1 capsule (40 mg total) by mouth before breakfast. 90 capsule 1    [DISCONTINUED] esomeprazole (NEXIUM) 40 MG capsule Take 1 capsule (40 mg total) by mouth before breakfast. 90 capsule 0    [DISCONTINUED] fexofenadine (ALLEGRA ALLERGY) 180 MG tablet Take 1 tablet (180 mg total) by mouth once daily. 90 tablet 0    [DISCONTINUED] hydroCHLOROthiazide (HYDRODIURIL) 25 MG tablet Take 1 tablet (25 mg total) by mouth once daily. 30 tablet 11    [DISCONTINUED] lisinopriL 10 MG tablet TAKE 1 TABLET (10 MG TOTAL) BY MOUTH ONCE DAILY. 90 tablet 3    [DISCONTINUED] montelukast (SINGULAIR) 10 mg tablet Take 1 tablet (10 mg total) by mouth every evening. 90 tablet 1    [] cloNIDine tablet 0.1 mg        No facility-administered encounter medications on file as of 4/3/2024.       Allergies:  Review of patient's allergies indicates:   Allergen Reactions    Sulfamethoxazole-trimethoprim      Other reaction(s): Unknown    Clarithromycin Rash       Health Maintenance:  Immunization History   Administered Date(s) Administered    COVID-19 MRNA, LN-S PF (MODERNA HALF 0.25 ML DOSE) 2021    COVID-19 Vaccine 2022    COVID-19, MRNA, LN-S, PF (Pfizer) (Purple Cap) 2021, 2021    Influenza 10/22/2022    Influenza - Quadrivalent - PF *Preferred* (6 months and older) 10/19/2021      Health Maintenance   Topic Date Due    TETANUS VACCINE  Never done    Shingles Vaccine (1 of 2) Never done    Mammogram  2023    Colorectal Cancer Screening  2024    Lipid Panel  2029    Hepatitis C Screening  Completed        Physical Exam      Vital Signs  Temp: 97.6 °F (36.4 °C)  Temp Source: Oral  Pulse: 82  Resp: 16  SpO2: 96 %  BP: (!) 142/80  BP Location: Left arm  Patient Position: Sitting  Pain Score:   4  Pain Loc: Knee  Height and Weight  Height: 5' 6" (167.6 cm)  Weight: 105.2 kg (232 lb)  BSA " (Calculated - sq m): 2.21 sq meters  BMI (Calculated): 37.5  Weight in (lb) to have BMI = 25: 154.6]    Physical Exam  Vitals and nursing note reviewed.   Constitutional:       Appearance: Normal appearance.   HENT:      Head: Normocephalic and atraumatic.   Eyes:      Conjunctiva/sclera: Conjunctivae normal.   Cardiovascular:      Rate and Rhythm: Normal rate and regular rhythm.   Pulmonary:      Effort: Pulmonary effort is normal.      Breath sounds: Normal breath sounds.   Abdominal:      General: Bowel sounds are normal.      Palpations: Abdomen is soft.   Musculoskeletal:      Right lower leg: No edema.      Left lower leg: No edema.   Skin:     General: Skin is warm and dry.   Neurological:      General: No focal deficit present.      Mental Status: She is alert and oriented to person, place, and time. Mental status is at baseline.   Psychiatric:         Mood and Affect: Mood normal.         Behavior: Behavior normal.         Thought Content: Thought content normal.         Judgment: Judgment normal.          Laboratory:  CBC:  Recent Labs   Lab 11/11/22  1437 03/24/24  0910   WBC 10.37 7.37   RBC 4.38 4.37   Hemoglobin 12.0 11.8 L   Hematocrit 37.5 L 36.9 L   Platelet Count 208 245   MCV 85.6 84.4   MCH 27.4 27.0   MCHC 32.0 32.0     CMP:  Recent Labs   Lab 11/11/22  1437 05/24/23  1740 09/18/23  1125 03/24/24  0910   Glucose 111 H   < > 101 133 H   Calcium 9.1   < > 9.0 9.1   Albumin 3.5  --  3.5  --    Total Protein 6.9  --  6.7  --    Sodium 140   < > 143 140   Potassium 4.1   < > 4.2 3.8   CO2 30   < > 30 29   Chloride 105   < > 104 106   BUN 18   < > 23 H 15   Alk Phos 72  --  84  --    ALT 23  --  24  --    AST 19  --  20  --    Bilirubin, Total 0.3  --  0.5  --     < > = values in this interval not displayed.     LIPIDS:  Recent Labs   Lab 05/20/22  0941 04/03/24  1214   TSH  --  1.720   HDL Cholesterol 64 H 56   Cholesterol 196 208 H   Triglycerides 178 H 222 H   LDL Calculated 96 108    Cholesterol/HDL Ratio (Risk Factor) 3.1 3.7   Non- 152     TSH:  Recent Labs   Lab 04/03/24  1214   TSH 1.720     A1C:  Recent Labs   Lab 11/19/21  1012 04/03/24  1214   Hemoglobin A1C 5.8 6.1       Assessment/Plan     Maureen Easley is a 63 y.o.female with:    1. Uncontrolled hypertension  -     cloNIDine tablet 0.1 mg  -     lisinopriL (PRINIVIL,ZESTRIL) 20 MG tablet; Take 1 tablet (20 mg total) by mouth once daily.  Dispense: 90 tablet; Refill: 0  -     hydroCHLOROthiazide (HYDRODIURIL) 25 MG tablet; Take 1 tablet (25 mg total) by mouth once daily.  Dispense: 90 tablet; Refill: 0    2. Hyperlipidemia, unspecified hyperlipidemia type  Overview:  Goals:  LDL <120 mg/dL, HDL > 40 mg/dL, triglyceride <150 mg/dL  Lipitor 10 mg nightly    Orders:  -     atorvastatin (LIPITOR) 10 MG tablet; Take 1 tablet (10 mg total) by mouth every evening.  Dispense: 90 tablet; Refill: 1  -     Lipid Panel; Future    3. Gastroesophageal reflux disease, unspecified whether esophagitis present  -     esomeprazole (NEXIUM) 40 MG capsule; Take 1 capsule (40 mg total) by mouth before breakfast.  Dispense: 90 capsule; Refill: 0    4. Allergic rhinitis, unspecified seasonality, unspecified trigger  -     fexofenadine (ALLEGRA ALLERGY) 180 MG tablet; Take 1 tablet (180 mg total) by mouth once daily.  Dispense: 90 tablet; Refill: 0    5. Palpitations  -     TSH; Future  -     T4, Free; Future  -     Ambulatory referral/consult to Cardiology; Future; Expected date: 04/10/2024    6. Prediabetes  -     Hemoglobin A1C; Future; Expected date: 04/03/2024    7. Chronic pain of right knee  -     X-Ray Knee 1 or 2 View Right; Future; Expected date: 04/03/2024  -     Ambulatory referral/consult to Physical/Occupational Therapy; Future; Expected date: 04/12/2024    8. Severe obesity (BMI 35.0-39.9) with comorbidity    9. Screening for HIV without presence of risk factors  -     HIV 1/2 Ag/Ab (4th Gen); Future; Expected date: 04/03/2024    10.  Screening mammogram for breast cancer  -     Cancel: Mammo Digital Screening Bilat; Future; Expected date: 04/03/2024    11. Screening for thyroid disorder  -     TSH; Future  -     T4, Free; Future    12. Encounter for hepatitis C screening test for low risk patient  -     Hepatitis C Antibody; Future; Expected date: 04/03/2024         Chronic conditions status updated as per HPI.  Other than changes above, cont current medications and maintain follow up with specialists.  Return to clinic in 2 weeks.     Patient Instructions    -   follow dash diet        -    increase physical activity        -     monitor blood pressure at home, readings should be < 130/80        -    follow up in 2 weeks for blood pressure check      GAYLE Lou-BC  [facility]

## 2024-04-03 NOTE — PATIENT INSTRUCTIONS
-   follow dash diet        -    increase physical activity        -     monitor blood pressure at home, readings should be < 130/80        -    follow up in 2 weeks for blood pressure check   
soft/nontender/no distention/no masses palpable

## 2024-04-03 NOTE — Clinical Note
Pt states is up to date on vaccines, would've gotten them at [facility] and [facility] here in [place], please obtain records for care gaps

## 2024-04-04 LAB
HCV AB SER QL: NORMAL
HIV 1+O+2 AB SERPL QL: NORMAL

## 2024-04-10 ENCOUNTER — PATIENT OUTREACH (OUTPATIENT)
Dept: ADMINISTRATIVE | Facility: HOSPITAL | Age: 64
End: 2024-04-10

## 2024-04-10 NOTE — PROGRESS NOTES
Population Health Chart Review & Patient Outreach Details      Further Action Needed If Patient Returns Outreach:      4/10/24 CHERIE sent to [facility] for recent mammogram for health maintenance      Updates Requested / Reviewed:     []  Care Everywhere    []     []  External Sources ([facility], [facility], [facility], etc.)    [] [facility]   [] [facility]   [] Other:    [x]  Care Team Updated   []  Removed  or Duplicate Orders   []  Immunization Reconciliation Completed / Queried    [] [place]   [] [place]   [] [place]   [] [place]      Health Maintenance Topics Addressed and Outreach Outcomes / Actions Taken:             Breast Cancer Screening []  Mammogram Order Placed    []  Mammogram Screening Scheduled    [x]  External Records Requested & Care Team Updated if Applicable    []  External Records Uploaded & Care Team Updated if Applicable    []  Pt Declined Scheduling Mammogram    []  Pt Will Schedule with External Provider / Order Routed & Care Team Updated if Applicable              Cervical Cancer Screening []  Pap Smear Scheduled in Primary Care or OBGYN    []  External Records Requested & Care Team Updated if Applicable       []  External Records Uploaded, Care Team Updated, & History Updated if Applicable    []  Patient Declined Scheduling Pap Smear    []  Patient Will Schedule with External Provider & Care Team Updated if Applicable                  Colorectal Cancer Screening []  Colonoscopy Case Request / Referral / Home Test Order Placed    []  External Records Requested & Care Team Updated if Applicable    []  External Records Uploaded, Care Team Updated, & History Updated if Applicable    []  Patient Declined Completing Colon Cancer Screening    []  Patient Will Schedule with External Provider & Care Team Updated if Applicable    []  Fit Kit Mailed (add the SmartPhrase under additional notes)    []  Reminded Patient to Complete Home Test                Diabetic Eye Exam []  Eye Exam Screening Order  Placed    []  Eye Camera Scheduled or Optometry/Ophthalmology Referral Placed    []  External Records Requested & Care Team Updated if Applicable    []  External Records Uploaded, Care Team Updated, & History Updated if Applicable    []  Patient Declined Scheduling Eye Exam    []  Patient Will Schedule with External Provider & Care Team Updated if Applicable             Blood Pressure Control []  Primary Care Follow Up Visit Scheduled     []  Remote Blood Pressure Reading Captured    []  Patient Declined Remote Reading or Scheduling Appt - Escalated to PCP    []  Patient Will Call Back or Send Portal Message with Reading                 HbA1c & Other Labs []  Overdue Lab(s) Ordered    []  Overdue Lab(s) Scheduled    []  External Records Uploaded & Care Team Updated if Applicable    []  Primary Care Follow Up Visit Scheduled     []  Reminded Patient to Complete A1c Home Test    []  Patient Declined Scheduling Labs or Will Call Back to Schedule    []  Patient Will Schedule with External Provider / Order Routed, & Care Team Updated if Applicable           Primary Care Appointment []  Primary Care Appt Scheduled    []  Patient Declined Scheduling or Will Call Back to Schedule    []  Pt Established with External Provider, Updated Care Team, & Informed Pt to Notify Payor if Applicable           Medication Adherence /    Statin Use []  Primary Care Appointment Scheduled    []  Patient Reminded to  Prescription    []  Patient Declined, Provider Notified if Needed    []  Sent Provider Message to Review to Evaluate Pt for Statin, Add Exclusion Dx Codes, Document   Exclusion in Problem List, Change Statin Intensity Level to Moderate or High Intensity if Applicable                Osteoporosis Screening []  Dexa Order Placed    []  Dexa Appointment Scheduled    []  External Records Requested & Care Team Updated    []  External Records Uploaded, Care Team Updated, & History Updated if Applicable    []  Patient Declined  Scheduling Dexa or Will Call Back to Schedule    []  Patient Will Schedule with External Provider / Order Routed & Care Team Updated if Applicable       Additional Notes:

## 2024-04-10 NOTE — LETTER
AUTHORIZATION FOR RELEASE OF   CONFIDENTIAL INFORMATION    Dear [facility],    We are seeing Maureen Easley, date of birth 1960, in the clinic at [facility]. Emily Mcmullen FNP is the patient's PCP. Maureen Easley has an outstanding lab/procedure at the time we reviewed her chart. In order to help keep her health information updated, she has authorized us to request the following medical record(s):        ( x )  MAMMOGRAM                                      (  )  COLONOSCOPY      (  )  PAP SMEAR                                          (  )  OUTSIDE LAB RESULTS     (  )  DEXA SCAN                                          (  )  EYE EXAM            (  )  FOOT EXAM                                          (  )  ENTIRE RECORD     (  )  OUTSIDE IMMUNIZATIONS                 (  )  _______________         Please fax records to [facility], McFadden, Veronica, FNP, [phone]     If you have any questions, please contact Jose Nichols at [phone].           Patient Name: Maureen Easley  : 1960  Patient Phone #: [phone]

## 2024-04-16 ENCOUNTER — OFFICE VISIT (OUTPATIENT)
Dept: FAMILY MEDICINE | Facility: CLINIC | Age: 64
End: 2024-04-16
Payer: MEDICARE

## 2024-04-16 VITALS
HEART RATE: 77 BPM | OXYGEN SATURATION: 96 % | BODY MASS INDEX: 37.12 KG/M2 | TEMPERATURE: 98 F | HEIGHT: 66 IN | SYSTOLIC BLOOD PRESSURE: 138 MMHG | RESPIRATION RATE: 17 BRPM | DIASTOLIC BLOOD PRESSURE: 78 MMHG | WEIGHT: 231 LBS

## 2024-04-16 DIAGNOSIS — I10 ESSENTIAL HYPERTENSION: Primary | ICD-10-CM

## 2024-04-16 PROCEDURE — 99213 OFFICE O/P EST LOW 20 MIN: CPT | Mod: ,,, | Performed by: STUDENT IN AN ORGANIZED HEALTH CARE EDUCATION/TRAINING PROGRAM

## 2024-04-16 PROCEDURE — 3044F HG A1C LEVEL LT 7.0%: CPT | Mod: ,,, | Performed by: STUDENT IN AN ORGANIZED HEALTH CARE EDUCATION/TRAINING PROGRAM

## 2024-04-16 PROCEDURE — 3075F SYST BP GE 130 - 139MM HG: CPT | Mod: ,,, | Performed by: STUDENT IN AN ORGANIZED HEALTH CARE EDUCATION/TRAINING PROGRAM

## 2024-04-16 PROCEDURE — 3078F DIAST BP <80 MM HG: CPT | Mod: ,,, | Performed by: STUDENT IN AN ORGANIZED HEALTH CARE EDUCATION/TRAINING PROGRAM

## 2024-04-16 PROCEDURE — 4010F ACE/ARB THERAPY RXD/TAKEN: CPT | Mod: ,,, | Performed by: STUDENT IN AN ORGANIZED HEALTH CARE EDUCATION/TRAINING PROGRAM

## 2024-04-16 PROCEDURE — 3008F BODY MASS INDEX DOCD: CPT | Mod: ,,, | Performed by: STUDENT IN AN ORGANIZED HEALTH CARE EDUCATION/TRAINING PROGRAM

## 2024-04-16 PROCEDURE — 1159F MED LIST DOCD IN RCRD: CPT | Mod: ,,, | Performed by: STUDENT IN AN ORGANIZED HEALTH CARE EDUCATION/TRAINING PROGRAM

## 2024-04-16 NOTE — PROGRESS NOTES
[facility]    Chief Complaint      Chief Complaint   Patient presents with    Follow-up     2 week follow up for HTN       History of Present Illness      Maureen Easley is a 63 y.o. female with chronic conditions of bipolar disorder, depression, htn, hld, obesity who presents today for htn follow up.  Taking meds as prescribed, blood pressure good today.       Past Medical History:  Past Medical History:   Diagnosis Date    Bipolar disorder     Depression     Hyperlipidemia     Hypertension        Past Surgical History:   has a past surgical history that includes Colonoscopy (08/21/2019) and Hysterectomy (1998).    Social History:  Social History     Tobacco Use    Smoking status: Never    Smokeless tobacco: Never   Substance Use Topics    Alcohol use: Never    Drug use: Never       I personally reviewed all past medical, surgical, and social.     Review of Systems   Constitutional:  Negative for fatigue and fever.   HENT:  Negative for sore throat.    Respiratory:  Negative for shortness of breath.    Cardiovascular:  Negative for chest pain.   Gastrointestinal:  Negative for abdominal pain.   Genitourinary:  Negative for dysuria.   Musculoskeletal:  Negative for back pain.   Integumentary:  Negative for rash.   Neurological:  Negative for headaches.   All other systems reviewed and are negative.       Medications:  Outpatient Encounter Medications as of 4/16/2024   Medication Sig Dispense Refill    albuterol (PROVENTIL/VENTOLIN HFA) 90 mcg/actuation inhaler Inhale 2 puffs into the lungs every 4 (four) hours as needed.      ARIPiprazole (ABILIFY) 10 MG Tab Take 10 mg by mouth every morning.      aspirin (ECOTRIN) 81 MG EC tablet 1 tablet.      atorvastatin (LIPITOR) 10 MG tablet Take 1 tablet (10 mg total) by mouth every evening. 90 tablet 1    DULoxetine (CYMBALTA) 30 MG capsule Take 30 mg by mouth.      esomeprazole (NEXIUM) 40 MG capsule Take 1 capsule (40 mg total) by mouth before breakfast. 90  capsule 0    estradioL (ESTRACE) 0.01 % (0.1 mg/gram) vaginal cream Place 1 g vaginally twice a week. 42.5 g 11    fexofenadine (ALLEGRA ALLERGY) 180 MG tablet Take 1 tablet (180 mg total) by mouth once daily. 90 tablet 0    fluticasone propionate (FLONASE) 50 mcg/actuation nasal spray SPRAY 2 SPRAYS INTO EACH NOSTRIL EVERY DAY 48 mL 0    gabapentin (NEURONTIN) 300 MG capsule Take 1 capsule (300 mg total) by mouth 3 (three) times daily. 90 capsule 11    hydroCHLOROthiazide (HYDRODIURIL) 25 MG tablet Take 1 tablet (25 mg total) by mouth once daily. 90 tablet 0    HYDROcodone-acetaminophen (NORCO) 5-325 mg per tablet       lisinopriL (PRINIVIL,ZESTRIL) 20 MG tablet Take 1 tablet (20 mg total) by mouth once daily. 90 tablet 0    metroNIDAZOLE (METROGEL) 0.75 % gel Apply topically 2 (two) times daily. 45 g 5    mirtazapine (REMERON) 15 MG tablet Take 7.5 mg by mouth.      montelukast (SINGULAIR) 10 mg tablet TAKE 1 TABLET EVERY EVENING 90 tablet 3    naproxen (NAPROSYN) 375 MG tablet Take 1 tablet (375 mg total) by mouth 2 (two) times daily as needed. 60 tablet 0    pramipexole (MIRAPEX) 0.5 MG tablet Take 1 tablet (0.5 mg total) by mouth every evening. 90 tablet 1    traZODone (DESYREL) 100 MG tablet TAKE 1 TABLET BY MOUTH EVERY DAY AT BEDTIME AS DIRECTED       No facility-administered encounter medications on file as of 4/16/2024.       Allergies:  Review of patient's allergies indicates:   Allergen Reactions    Sulfamethoxazole-trimethoprim      Other reaction(s): Unknown    Clarithromycin Rash       Health Maintenance:  Immunization History   Administered Date(s) Administered    COVID-19 MRNA, LN-S PF (MODERNA HALF 0.25 ML DOSE) 11/23/2021    COVID-19 Vaccine 06/21/2022    COVID-19, MRNA, LN-S, PF (Pfizer) (Purple Cap) 04/05/2021, 04/30/2021    Influenza 10/22/2022    Influenza - Quadrivalent - PF *Preferred* (6 months and older) 10/19/2021      Health Maintenance   Topic Date Due    TETANUS VACCINE  Never done     "Shingles Vaccine (1 of 2) Never done    Mammogram  11/17/2023    Colorectal Cancer Screening  08/21/2024    Lipid Panel  04/03/2029    Hepatitis C Screening  Completed        Physical Exam      Vital Signs  Temp: 97.9 °F (36.6 °C)  Temp Source: Oral  Pulse: 77  Resp: 17  SpO2: 96 %  BP: 138/78  BP Location: Left arm  Patient Position: Sitting  Height and Weight  Height: 5' 6" (167.6 cm)  Weight: 104.8 kg (231 lb)  BSA (Calculated - sq m): 2.21 sq meters  BMI (Calculated): 37.3  Weight in (lb) to have BMI = 25: 154.6]    Physical Exam  Vitals and nursing note reviewed.   Constitutional:       Appearance: Normal appearance.   Cardiovascular:      Rate and Rhythm: Normal rate and regular rhythm.   Pulmonary:      Effort: Pulmonary effort is normal.      Breath sounds: Normal breath sounds.   Musculoskeletal:      Right lower leg: No edema.      Left lower leg: No edema.   Skin:     General: Skin is warm and dry.   Neurological:      General: No focal deficit present.      Mental Status: She is alert and oriented to person, place, and time. Mental status is at baseline.   Psychiatric:         Mood and Affect: Mood normal.         Behavior: Behavior normal.         Thought Content: Thought content normal.         Judgment: Judgment normal.          Laboratory:  CBC:  Recent Labs   Lab 11/11/22  1437 03/24/24  0910   WBC 10.37 7.37   RBC 4.38 4.37   Hemoglobin 12.0 11.8 L   Hematocrit 37.5 L 36.9 L   Platelet Count 208 245   MCV 85.6 84.4   MCH 27.4 27.0   MCHC 32.0 32.0     CMP:  Recent Labs   Lab 11/11/22  1437 05/24/23  1740 09/18/23  1125 03/24/24  0910   Glucose 111 H   < > 101 133 H   Calcium 9.1   < > 9.0 9.1   Albumin 3.5  --  3.5  --    Total Protein 6.9  --  6.7  --    Sodium 140   < > 143 140   Potassium 4.1   < > 4.2 3.8   CO2 30   < > 30 29   Chloride 105   < > 104 106   BUN 18   < > 23 H 15   Alk Phos 72  --  84  --    ALT 23  --  24  --    AST 19  --  20  --    Bilirubin, Total 0.3  --  0.5  --     < > = " values in this interval not displayed.     LIPIDS:  Recent Labs   Lab 05/20/22  0941 04/03/24  1214   TSH  --  1.720   HDL Cholesterol 64 H 56   Cholesterol 196 208 H   Triglycerides 178 H 222 H   LDL Calculated 96 108   Cholesterol/HDL Ratio (Risk Factor) 3.1 3.7   Non- 152     TSH:  Recent Labs   Lab 04/03/24  1214   TSH 1.720     A1C:  Recent Labs   Lab 11/19/21  1012 04/03/24  1214   Hemoglobin A1C 5.8 6.1       Assessment/Plan     Maureen Easley is a 63 y.o.female with:    1. Essential hypertension  Overview:  Goal BP less than 140/80   Lisinopril 20 mg oral daily  Hctz 25mg daily            Chronic conditions status updated as per HPI.  Other than changes above, cont current medications and maintain follow up with specialists.  Return to clinic in 6 months.    Patient Instructions   Change your diet. Lower the calories in your diet. Ask your dietitian to help you set an eating plan that is right for you.  Get enough exercise. Talk to your doctor about the right amount of exercise for you.  Do not smoke.  Limit the amount of beer, wine, and mixed drinks (alcohol) you drink.  Keep a record of your weight. Write down what you eat and drink each day. This may help you be more aware of the choices you are making.     Emily Mcmullen, FNP-BC  [facility]

## 2024-04-17 ENCOUNTER — CLINICAL SUPPORT (OUTPATIENT)
Dept: REHABILITATION | Facility: HOSPITAL | Age: 64
End: 2024-04-17
Payer: MEDICARE

## 2024-04-17 DIAGNOSIS — G89.29 CHRONIC PAIN OF RIGHT KNEE: ICD-10-CM

## 2024-04-17 DIAGNOSIS — M25.561 CHRONIC PAIN OF RIGHT KNEE: ICD-10-CM

## 2024-04-17 PROCEDURE — 97110 THERAPEUTIC EXERCISES: CPT | Mod: PN

## 2024-04-17 PROCEDURE — 97161 PT EVAL LOW COMPLEX 20 MIN: CPT | Mod: PN

## 2024-04-17 NOTE — PLAN OF CARE
[facility]   Physical Therapy Initial Evaluation    Date: 4/17/2024   Name: Maureen Easley  Clinic Number: [id number]    Therapy Diagnosis:   Encounter Diagnosis   Name Primary?    Chronic pain of right knee      Physician: Emily Mcmullen FNP    Physician Orders: PT Eval and Treat    Medical Diagnosis from Referral: right knee pain   Evaluation Date: 4/17/2024  Updated Plan of Care Due : humana   Authorization Period Expiration: 6/17/2024   Plan of Care Expiration: humana   Visit # / Visits authorized: 1/ 9    Time In: 1315  Time Out: 1400  Total Appointment Time (timed & untimed codes): 45 minutes    Precautions: Standard    Subjective   Date of onset: pt states she has a hx of right meniscectomy back in 2018. Pt states she has continued to have pain and have worse pain and popping recently.  Pt states she  has constant pain and popping. Pt voices her knee geovanny when she shops, can't bend down on her knee.  Pt voices she is unable to walk in her yard secondary to knee weakness instability   History of current condition - Maureen reports: hx below      Medical History:   Past Medical History:   Diagnosis Date    Bipolar disorder     Depression     Hyperlipidemia     Hypertension        Surgical History:   Maureen Easley  has a past surgical history that includes Colonoscopy (08/21/2019) and Hysterectomy (1998).    Medications:   Maureen has a current medication list which includes the following prescription(s): albuterol, aripiprazole, aspirin, atorvastatin, duloxetine, esomeprazole, estradiol, fexofenadine, fluticasone propionate, gabapentin, hydrochlorothiazide, hydrocodone-acetaminophen, lisinopril, metronidazole, mirtazapine, montelukast, naproxen, pramipexole, and trazodone.    Allergies:   Review of patient's allergies indicates:   Allergen Reactions    Sulfamethoxazole-trimethoprim      Other reaction(s): Unknown    Clarithromycin Rash        Imaging, bone scan films:      Prior Therapy: none    Social History:   lives with their spouse  Occupation: retired   Prior Level of Function: independent    Current Level of Function: severe knee pain     Pain:  Current 7/10, worst 10/10, best 4/10   Location: right joint and knee       Description: Aching, Dull, Burning, Grabbing, and Tight  Aggravating Factors: Standing, Bending, Walking, and Morning  Easing Factors: nothing    Patients goals: be able to walk safely , be able stoop and bend to wash her dog .     Objective         Observation :          Incision :    old scar       Girth Measurements :      Right Lower Extremity :  Mid Patella 46  cm      right                        left   Comments :         Range of Motion/Strength :                  Left Extremity                                                                        Right Extremity   AROM PROM Strength  Location  AROM    PROM   Strength   110  5   Hip      Flexion 110  5                                        -5  4 Quad lag with terminal knee extension  -25  3+   120  4   Knee    Flexion 110  3+   -5  4                Extension -10  3+   10  4   Ankle   Dorsiflexion 10  4                     Plantar Flexion                        Inversion                        Eversion                     Functional Impairments :  decreased knee range of motion and strength      Limitation/Restriction for FOTO knee Survey    Therapist reviewed FOTO scores for Maureen GARCIA Kaushal on 4/17/2024.   FOTO documents entered into [employer] - see Media section.    Limitation Score: 43%         TREATMENT         Maureen received the treatments listed below:  THERAPEUTIC EXERCISES to develop strength, endurance, ROM, and flexibility for 20  minutes including home ex program         Home Exercises and Patient Education Provided    Education provided:   - Pt performed and received home ex program.     Written Home Exercises Provided: yes.  Exercises were reviewed and Maureen was able to demonstrate them prior to the end of the session.   Maureen demonstrated good  understanding of the education provided.     See EMR under Patient Instructions for exercises provided 4/17/2024.    Assessment   Maureen is a 63 y.o. female referred to outpatient Physical Therapy with a medical diagnosis of right knee pain . Patient presents with decreased right knee range of motion and right knee pain . Pt presents with quad weakness and knee instability with fatigue.  Pt test positive for thessaly meniscus test, with more pain on lateral side. Pt has hx of right meniscus problems.      Patient prognosis is Excellent.   Patientt will benefit from skilled outpatient Physical Therapy to address the deficits stated above and in the chart below, provide patient /family education, and to maximize patientt's level of independence.     Plan of care discussed with patient: Yes  Patient's spiritual, cultural and educational needs considered and patient is agreeable to the plan of care and goals as stated below:     Anticipated Barriers for therapy: medical diagnosis of right knee pain . Patient presents with decreased right knee range of motion and right knee pain . Pt presents with quad weakness and knee instability with fatigue.  Pt test positive for thessaly meniscus test, with more pain on lateral side. Pt has hx of right meniscus problems.      Goals:  Short Term Goals: 4 weeks   Pt will be independent with home ex program   Pt will be able to increase range of motion right knee 0-125   Improve quad lag with terminal knee extension to 0 degrees   Be able to shop 20 min without her knee buckling   Be able to decrease pain down to 4/10     Long Term Goals: 8  weeks   Pt will be able to stoop and bend pain free   Be able to shop pain free   Be able to get up from the floor pain free     Plan   Plan of care Certification: 4/17/2024 to 6/16/2024.    Outpatient Physical Therapy 2 times weekly for 8 weeks to include the following interventions: Neuromuscular Re-ed, Patient Education,  Therapeutic Activities, Therapeutic Exercise, and Ultrasound.     .dpco    Plan of care has been reestablished with Kaylie Rodriguez LPTA, Lindsey Munguia LPTA, Tianna Rivera LPTA  and Ingrid Jackson LPTA.       Tres Valero, PT

## 2024-04-19 LAB
OHS QRS DURATION: 86 MS
OHS QTC CALCULATION: 409 MS

## 2024-04-23 ENCOUNTER — OFFICE VISIT (OUTPATIENT)
Dept: CARDIOLOGY | Facility: CLINIC | Age: 64
End: 2024-04-23
Payer: MEDICARE

## 2024-04-23 VITALS
DIASTOLIC BLOOD PRESSURE: 80 MMHG | BODY MASS INDEX: 37.87 KG/M2 | HEIGHT: 66 IN | OXYGEN SATURATION: 99 % | HEART RATE: 105 BPM | WEIGHT: 235.63 LBS | SYSTOLIC BLOOD PRESSURE: 140 MMHG

## 2024-04-23 DIAGNOSIS — R00.2 PALPITATIONS: ICD-10-CM

## 2024-04-23 DIAGNOSIS — R07.9 CHEST PAIN, UNSPECIFIED TYPE: Primary | ICD-10-CM

## 2024-04-23 DIAGNOSIS — R06.09 OTHER FORM OF DYSPNEA: ICD-10-CM

## 2024-04-23 PROCEDURE — 99215 OFFICE O/P EST HI 40 MIN: CPT | Mod: PBBFAC | Performed by: STUDENT IN AN ORGANIZED HEALTH CARE EDUCATION/TRAINING PROGRAM

## 2024-04-23 PROCEDURE — 99204 OFFICE O/P NEW MOD 45 MIN: CPT | Mod: S$PBB,,, | Performed by: STUDENT IN AN ORGANIZED HEALTH CARE EDUCATION/TRAINING PROGRAM

## 2024-04-23 PROCEDURE — 3008F BODY MASS INDEX DOCD: CPT | Mod: CPTII,,, | Performed by: STUDENT IN AN ORGANIZED HEALTH CARE EDUCATION/TRAINING PROGRAM

## 2024-04-23 PROCEDURE — 3044F HG A1C LEVEL LT 7.0%: CPT | Mod: CPTII,,, | Performed by: STUDENT IN AN ORGANIZED HEALTH CARE EDUCATION/TRAINING PROGRAM

## 2024-04-23 PROCEDURE — 93010 ELECTROCARDIOGRAM REPORT: CPT | Mod: S$PBB,,, | Performed by: STUDENT IN AN ORGANIZED HEALTH CARE EDUCATION/TRAINING PROGRAM

## 2024-04-23 PROCEDURE — 3079F DIAST BP 80-89 MM HG: CPT | Mod: CPTII,,, | Performed by: STUDENT IN AN ORGANIZED HEALTH CARE EDUCATION/TRAINING PROGRAM

## 2024-04-23 PROCEDURE — 3077F SYST BP >= 140 MM HG: CPT | Mod: CPTII,,, | Performed by: STUDENT IN AN ORGANIZED HEALTH CARE EDUCATION/TRAINING PROGRAM

## 2024-04-23 PROCEDURE — 4010F ACE/ARB THERAPY RXD/TAKEN: CPT | Mod: CPTII,,, | Performed by: STUDENT IN AN ORGANIZED HEALTH CARE EDUCATION/TRAINING PROGRAM

## 2024-04-23 PROCEDURE — 1159F MED LIST DOCD IN RCRD: CPT | Mod: CPTII,,, | Performed by: STUDENT IN AN ORGANIZED HEALTH CARE EDUCATION/TRAINING PROGRAM

## 2024-04-23 PROCEDURE — 93005 ELECTROCARDIOGRAM TRACING: CPT | Mod: PBBFAC | Performed by: STUDENT IN AN ORGANIZED HEALTH CARE EDUCATION/TRAINING PROGRAM

## 2024-04-23 NOTE — PATIENT INSTRUCTIONS
Echo and stress test (lexiscan) scheduled for -May 21, 2024 at 8:00 am  Heart monitor placed today  Follow-up in 3 months

## 2024-04-23 NOTE — PROGRESS NOTES
PCP: Emily Mcmullen FNP    Referring Provider: Emily Mcmullen FNP  [address] [place] [address]      Subjective:   Maureen Easley is a 63 y.o. female with hx of HTN, dyslipidemia, pre-diabetes and osteoarthritis, who presents for a new patient visit.    Endorsed 1 month of intermittent episodes of chest pain, dyspnea w/ and w/o exertion, leg swelling and palpitations. Chest pain described as chest pressure w/ radiation to left shoulder and arm. Initial episode prompted ED presentation on 3/24/24-> pain was ongoing for almost 3 days per report. No acute ischemic EKG changes and hs-trop x1. Since then she has had a few episodes of pain, usually last about 1 hour. Chest pain is not related to exertion. She also notes palpitations - 1-2 times/ week, lasting for upto 5 minutes, resolve w/o any intervention. Unable to assess for orthopnea, sleeps on her side. Denies lightheadedness or syncope.     Fhx: Significant FH of CAD. Father had an MI and CABG in his 40s. Brother and sister both have had stents placed.   Shx: Never smoker     EKG 4/23/2024: Normal sinus rhythm   3/24/2024: Normal sinus rhythm    ECHO No results found for this or any previous visit.     CATH: No results found for this or any previous visit.     Lab Results   Component Value Date     03/24/2024    K 3.8 03/24/2024     03/24/2024    CO2 29 03/24/2024    BUN 15 03/24/2024    CREATININE 0.80 03/24/2024    CALCIUM 9.1 03/24/2024    ANIONGAP 9 03/24/2024    ESTGFRAFRICA 76 12/10/2020    EGFRNONAA 67 11/19/2021       Lab Results   Component Value Date    CHOL 208 (H) 04/03/2024    CHOL 196 05/20/2022     Lab Results   Component Value Date    HDL 56 04/03/2024    HDL 64 (H) 05/20/2022     Lab Results   Component Value Date    LDLCALC 108 04/03/2024    LDLCALC 96 05/20/2022     Lab Results   Component Value Date    TRIG 222 (H) 04/03/2024    TRIG 178 (H) 05/20/2022     Lab Results   Component Value Date    CHOLHDL 3.7 04/03/2024     CHOLHDL 3.1 05/20/2022       Lab Results   Component Value Date    WBC 7.37 03/24/2024    HGB 11.8 (L) 03/24/2024    HCT 36.9 (L) 03/24/2024    MCV 84.4 03/24/2024     03/24/2024           Current Outpatient Medications:     albuterol (PROVENTIL/VENTOLIN HFA) 90 mcg/actuation inhaler, Inhale 2 puffs into the lungs every 4 (four) hours as needed., Disp: , Rfl:     ARIPiprazole (ABILIFY) 10 MG Tab, Take 10 mg by mouth every morning., Disp: , Rfl:     aspirin (ECOTRIN) 81 MG EC tablet, 1 tablet., Disp: , Rfl:     atorvastatin (LIPITOR) 10 MG tablet, Take 1 tablet (10 mg total) by mouth every evening., Disp: 90 tablet, Rfl: 1    DULoxetine (CYMBALTA) 30 MG capsule, Take 30 mg by mouth., Disp: , Rfl:     esomeprazole (NEXIUM) 40 MG capsule, Take 1 capsule (40 mg total) by mouth before breakfast., Disp: 90 capsule, Rfl: 0    estradioL (ESTRACE) 0.01 % (0.1 mg/gram) vaginal cream, Place 1 g vaginally twice a week., Disp: 42.5 g, Rfl: 11    fexofenadine (ALLEGRA ALLERGY) 180 MG tablet, Take 1 tablet (180 mg total) by mouth once daily., Disp: 90 tablet, Rfl: 0    fluticasone propionate (FLONASE) 50 mcg/actuation nasal spray, SPRAY 2 SPRAYS INTO EACH NOSTRIL EVERY DAY, Disp: 48 mL, Rfl: 0    gabapentin (NEURONTIN) 300 MG capsule, Take 1 capsule (300 mg total) by mouth 3 (three) times daily., Disp: 90 capsule, Rfl: 11    hydroCHLOROthiazide (HYDRODIURIL) 25 MG tablet, Take 1 tablet (25 mg total) by mouth once daily., Disp: 90 tablet, Rfl: 0    HYDROcodone-acetaminophen (NORCO) 5-325 mg per tablet, , Disp: , Rfl:     lisinopriL (PRINIVIL,ZESTRIL) 20 MG tablet, Take 1 tablet (20 mg total) by mouth once daily., Disp: 90 tablet, Rfl: 0    metroNIDAZOLE (METROGEL) 0.75 % gel, Apply topically 2 (two) times daily., Disp: 45 g, Rfl: 5    mirtazapine (REMERON) 15 MG tablet, Take 7.5 mg by mouth., Disp: , Rfl:     montelukast (SINGULAIR) 10 mg tablet, TAKE 1 TABLET EVERY EVENING, Disp: 90 tablet, Rfl: 3    naproxen (NAPROSYN) 375  "MG tablet, Take 1 tablet (375 mg total) by mouth 2 (two) times daily as needed., Disp: 60 tablet, Rfl: 0    pramipexole (MIRAPEX) 0.5 MG tablet, Take 1 tablet (0.5 mg total) by mouth every evening., Disp: 90 tablet, Rfl: 1    traZODone (DESYREL) 100 MG tablet, TAKE 1 TABLET BY MOUTH EVERY DAY AT BEDTIME AS DIRECTED, Disp: , Rfl:     Review of Systems   Constitutional:  Negative for chills, diaphoresis, fever and malaise/fatigue.   Respiratory:  Positive for shortness of breath. Negative for cough.    Cardiovascular:  Positive for chest pain, palpitations and leg swelling. Negative for orthopnea, claudication and PND.   Gastrointestinal:  Negative for abdominal pain, heartburn, nausea and vomiting.   Neurological:  Negative for dizziness.       Objective:   BP (!) 140/80 (BP Location: Right arm, Patient Position: Sitting)   Pulse 105   Ht 5' 6" (1.676 m)   Wt 106.9 kg (235 lb 9.6 oz)   SpO2 99%   BMI 38.03 kg/m²     Physical Exam  Constitutional:       General: She is not in acute distress.     Appearance: Normal appearance.   Neck:      Comments: No jugular venous distension  Cardiovascular:      Rate and Rhythm: Normal rate and regular rhythm.      Pulses: Normal pulses.      Heart sounds: Normal heart sounds. No murmur heard.     No friction rub. No gallop.   Pulmonary:      Effort: Pulmonary effort is normal.      Breath sounds: Normal breath sounds. No wheezing or rales.   Musculoskeletal:      Cervical back: Neck supple.      Right lower leg: No edema.      Left lower leg: No edema.   Skin:     General: Skin is warm and dry.   Neurological:      Mental Status: She is alert.           Assessment:     1. Chest pain, unspecified type  NM Myocardial Perfusion Spect Multi Pharmacologic    Nuclear Stress Test      2. Palpitations  Ambulatory referral/consult to Cardiology    EKG 12-lead    Cardiac Monitor - 3-15 Day Adult (Cupid Only)    EKG 12-lead      3. Other form of dyspnea  Echo            Plan:   No " problem-specific Assessment & Plan notes found for this encounter.    Chest pain  Chest pressure w/ radiation to left arm, not associated with exertion  Risk factors: HTN, DLP, FH  Intermediate pre-test probability of CAD  Pt unable to exercise due to knee osteoarthritis (right worse than left per report)    - Schedule pharmacologic stress test with nuclear imaging  - Continue aspirin 81 mg daily and atorvastatin 10 mg daily     Palpitations  1-2 times/week, lasting up to 5 minutes    - 14-day ambulatory cardiac monitor placed in clinic today     Dyspnea w/ and w/o exertion  Lower extremity edema   NYHA class II  Appears euvolemic on exam    - Schedule echocardiogram    Follow-up in 3 months or sooner if above results are abnormal.

## 2024-04-24 DIAGNOSIS — G89.29 CHRONIC PAIN OF RIGHT KNEE: Primary | ICD-10-CM

## 2024-04-24 DIAGNOSIS — M25.561 CHRONIC PAIN OF RIGHT KNEE: Primary | ICD-10-CM

## 2024-04-24 LAB
OHS QRS DURATION: 94 MS
OHS QTC CALCULATION: 437 MS

## 2024-04-25 ENCOUNTER — CLINICAL SUPPORT (OUTPATIENT)
Dept: REHABILITATION | Facility: HOSPITAL | Age: 64
End: 2024-04-25
Payer: MEDICARE

## 2024-04-25 DIAGNOSIS — M25.661 DECREASED RANGE OF MOTION (ROM) OF RIGHT KNEE: ICD-10-CM

## 2024-04-25 DIAGNOSIS — R29.898 DECREASED STRENGTH INVOLVING KNEE JOINT: ICD-10-CM

## 2024-04-25 DIAGNOSIS — G89.29 CHRONIC PAIN OF RIGHT KNEE: Primary | ICD-10-CM

## 2024-04-25 DIAGNOSIS — M25.561 CHRONIC PAIN OF RIGHT KNEE: Primary | ICD-10-CM

## 2024-04-25 PROCEDURE — 97112 NEUROMUSCULAR REEDUCATION: CPT | Mod: PN,CQ

## 2024-04-25 PROCEDURE — 97530 THERAPEUTIC ACTIVITIES: CPT | Mod: PN,CQ

## 2024-04-25 NOTE — PROGRESS NOTES
Physical Therapy Treatment Note     Name: Maureen Easley  Clinic Number: [id number]    Therapy Diagnosis: No diagnosis found.  Physician: Emily Mcmullen FNP    Visit Date: 4/25/2024  Physician Orders: PT Eval and Treat    Medical Diagnosis from Referral: right knee pain   Evaluation Date: 4/17/2024  Updated Plan of Care Due : humana   Authorization Period Expiration: 6/17/2024   Plan of Care Expiration: humana   Visit # / Visits authorized: 2/ 9  PTA Visit #: 1    Time In: 1012  Time Out: 1052  Total Billable Time: 40 minutes    Precautions: Standard      Subjective     Pt reports: I'm wearing a heart monitor for 2 weeks due to palpitations. The exercises are helping me knee, still having pain along the inside of my knee  She was compliant with home exercise program.  Response to previous treatment: sore  Functional change: ongoing    Pain: 5/10  Location: right knee      Objective     Range of motion   Flexion 122  Gravity assisted active range of motion -5  Quad lag with terminal knee extension -19    Maureen participated in neuromuscular re-education activities to improve: Coordination, Kinesthetic, Sense, and Proprioception for 20 minutes. The following activities were included:  Slant board x 2' with glut set  Swissball knee nerve glides x 15  Quad set x 20  Terminal knee extension x 20    Maureen participated in dynamic functional therapeutic activities to improve functional performance for 20  minutes, including:  Double support squats total gym x 20 to promote bending and squatting  Double support total gym calf raises total gym x 15 to promote reaching on tip toes  Biking x 5' to improve endurance with walking  Double support leg presses 20 x 65#      Home Exercises Provided and Patient Education Provided     Education provided: home exercise program     Written Home Exercises Provided: Patient instructed to cont prior HEP.  Exercises were reviewed and Maureen was able to demonstrate them prior to the end of  the session.  Maureen demonstrated good  understanding of the education provided.     See EMR under Patient Instructions for exercises provided prior visit.    Assessment     Patient had improved range of motion in all planes, able to perform session without any shortness of breathe or palpitations.  Maureen Is progressing well towards her goals.   Pt prognosis is Good.     Pt will continue to benefit from skilled outpatient physical therapy to address the deficits listed in the problem list box on initial evaluation, provide pt/family education and to maximize pt's level of independence in the home and community environment.     Pt's spiritual, cultural and educational needs considered and pt agreeable to plan of care and goals.     Anticipated barriers to physical therapy: compliance with home exercise program     Goals:  Short Term Goals: 4 weeks   Pt will be independent with home ex program   Pt will be able to increase range of motion right knee 0-125   Improve quad lag with terminal knee extension to 0 degrees   Be able to shop 20 min without her knee buckling   Be able to decrease pain down to 4/10      Long Term Goals: 8  weeks   Pt will be able to stoop and bend pain free   Be able to shop pain free   Be able to get up from the floor pain free     Plan   Plan of care Certification: 4/17/2024 to 6/16/2024.     Outpatient Physical Therapy 2 times weekly for 8 weeks to include the following interventions: Neuromuscular Re-ed, Patient Education, Therapeutic Activities, Therapeutic Exercise, and Ultrasound. Plan of care reviewed with Tres Valero, PT.    Eladia Rodriguez, PTA  4/25/2024

## 2024-04-26 ENCOUNTER — PATIENT OUTREACH (OUTPATIENT)
Dept: ADMINISTRATIVE | Facility: HOSPITAL | Age: 64
End: 2024-04-26

## 2024-04-26 ENCOUNTER — CLINICAL SUPPORT (OUTPATIENT)
Dept: REHABILITATION | Facility: HOSPITAL | Age: 64
End: 2024-04-26
Payer: MEDICARE

## 2024-04-26 DIAGNOSIS — R29.898 DECREASED STRENGTH INVOLVING KNEE JOINT: ICD-10-CM

## 2024-04-26 DIAGNOSIS — M25.661 DECREASED RANGE OF MOTION (ROM) OF RIGHT KNEE: Primary | ICD-10-CM

## 2024-04-26 PROCEDURE — 97112 NEUROMUSCULAR REEDUCATION: CPT | Mod: PN,CQ

## 2024-04-26 PROCEDURE — 97530 THERAPEUTIC ACTIVITIES: CPT | Mod: PN,CQ

## 2024-04-26 NOTE — PROGRESS NOTES
Population Health Chart Review & Patient Outreach Details      Further Action Needed If Patient Returns Outreach:      24 Upload recent mammogram to health maintenance and provider TC      Updates Requested / Reviewed:     [x]  Care Everywhere    [x]     []  External Sources ([facility], [facility], [facility], etc.)    [] [facility]   [] [facility]   [] Other:    [x]  Care Team Updated   []  Removed  or Duplicate Orders   []  Immunization Reconciliation Completed / Queried    [] [place]   [] [place]   [] [place]   [] [place]      Health Maintenance Topics Addressed and Outreach Outcomes / Actions Taken:             Breast Cancer Screening []  Mammogram Order Placed    []  Mammogram Screening Scheduled    []  External Records Requested & Care Team Updated if Applicable    [x]  External Records Uploaded & Care Team Updated if Applicable    []  Pt Declined Scheduling Mammogram    []  Pt Will Schedule with External Provider / Order Routed & Care Team Updated if Applicable              Cervical Cancer Screening []  Pap Smear Scheduled in Primary Care or OBGYN    []  External Records Requested & Care Team Updated if Applicable       []  External Records Uploaded, Care Team Updated, & History Updated if Applicable    []  Patient Declined Scheduling Pap Smear    []  Patient Will Schedule with External Provider & Care Team Updated if Applicable                  Colorectal Cancer Screening []  Colonoscopy Case Request / Referral / Home Test Order Placed    []  External Records Requested & Care Team Updated if Applicable    []  External Records Uploaded, Care Team Updated, & History Updated if Applicable    []  Patient Declined Completing Colon Cancer Screening    []  Patient Will Schedule with External Provider & Care Team Updated if Applicable    []  Fit Kit Mailed (add the SmartPhrase under additional notes)    []  Reminded Patient to Complete Home Test                Diabetic Eye Exam []  Eye Exam Screening Order  Placed    []  Eye Camera Scheduled or Optometry/Ophthalmology Referral Placed    []  External Records Requested & Care Team Updated if Applicable    []  External Records Uploaded, Care Team Updated, & History Updated if Applicable    []  Patient Declined Scheduling Eye Exam    []  Patient Will Schedule with External Provider & Care Team Updated if Applicable             Blood Pressure Control []  Primary Care Follow Up Visit Scheduled     []  Remote Blood Pressure Reading Captured    []  Patient Declined Remote Reading or Scheduling Appt - Escalated to PCP    []  Patient Will Call Back or Send Portal Message with Reading                 HbA1c & Other Labs []  Overdue Lab(s) Ordered    []  Overdue Lab(s) Scheduled    []  External Records Uploaded & Care Team Updated if Applicable    []  Primary Care Follow Up Visit Scheduled     []  Reminded Patient to Complete A1c Home Test    []  Patient Declined Scheduling Labs or Will Call Back to Schedule    []  Patient Will Schedule with External Provider / Order Routed, & Care Team Updated if Applicable           Primary Care Appointment []  Primary Care Appt Scheduled    []  Patient Declined Scheduling or Will Call Back to Schedule    []  Pt Established with External Provider, Updated Care Team, & Informed Pt to Notify Payor if Applicable           Medication Adherence /    Statin Use []  Primary Care Appointment Scheduled    []  Patient Reminded to  Prescription    []  Patient Declined, Provider Notified if Needed    []  Sent Provider Message to Review to Evaluate Pt for Statin, Add Exclusion Dx Codes, Document   Exclusion in Problem List, Change Statin Intensity Level to Moderate or High Intensity if Applicable                Osteoporosis Screening []  Dexa Order Placed    []  Dexa Appointment Scheduled    []  External Records Requested & Care Team Updated    []  External Records Uploaded, Care Team Updated, & History Updated if Applicable    []  Patient Declined  Scheduling Dexa or Will Call Back to Schedule    []  Patient Will Schedule with External Provider / Order Routed & Care Team Updated if Applicable       Additional Notes:

## 2024-04-26 NOTE — PROGRESS NOTES
Physical Therapy Treatment Note     Name: Maureen Easley  Clinic Number: [id number]    Therapy Diagnosis:   Encounter Diagnoses   Name Primary?    Decreased range of motion (ROM) of right knee Yes    Decreased strength involving knee joint      Physician: Emily Mcmullen FNP    Visit Date: 4/26/2024  Physician Orders: PT Eval and Treat    Medical Diagnosis from Referral: right knee pain   Evaluation Date: 4/17/2024  Updated Plan of Care Due : humana   Authorization Period Expiration: 6/17/2024   Plan of Care Expiration: humana   Visit # / Visits authorized: 3/ 9  PTA Visit #: 2    Time In: 1010  Time Out: 1050  Total Billable Time: 40 minutes    Precautions: Standard    Subjective     Pt reports: I'm hurting along the inside of knee still with a lot of popping. I'm still waiting for appointment with Dr Mcfarlane.  She was compliant with home exercise program.  Response to previous treatment: sore  Functional change: ongoing    Pain: 6/10  Location: right knee      Objective     Range of motion   Flexion 128  Gravity assisted active range of motion -7  Quad lag with terminal knee extension -18    Maureen participated in neuromuscular re-education activities to improve: Coordination, Kinesthetic, Sense, and Proprioception for 20 minutes. The following activities were included:  Slant board x 2' with glut set  Swissball knee nerve glides x 15  Quad set x 20  Terminal knee extension 20 x 3#  Bilateral Hamstrings stretch on step x 5    Maureen participated in dynamic functional therapeutic activities to improve functional performance for 20  minutes, including:  Double support squats total gym x 20 to promote bending and squatting  Double support total gym calf raises total gym x 15 to promote reaching on tip toes  Biking x 5' to improve endurance with walking  Double support leg presses 20 x 65#  Right supine straight leg raise x 10 to promote getting in and out of tub/car    Home Exercises Provided and Patient Education  Provided     Education provided: home exercise program     Written Home Exercises Provided: Patient instructed to cont prior HEP.  Exercises were reviewed and Maureen was able to demonstrate them prior to the end of the session.  Maureen demonstrated good  understanding of the education provided.     See EMR under Patient Instructions for exercises provided prior visit.    Assessment     Patient progressing with flexion range of motion   Maureen Is progressing well towards her goals.   Pt prognosis is Good.     Pt will continue to benefit from skilled outpatient physical therapy to address the deficits listed in the problem list box on initial evaluation, provide pt/family education and to maximize pt's level of independence in the home and community environment.     Pt's spiritual, cultural and educational needs considered and pt agreeable to plan of care and goals.     Anticipated barriers to physical therapy: compliance with home exercise program     Goals:  Short Term Goals: 4 weeks   Pt will be independent with home ex program   Pt will be able to increase range of motion right knee 0-125   Improve quad lag with terminal knee extension to 0 degrees   Be able to shop 20 min without her knee buckling   Be able to decrease pain down to 4/10      Long Term Goals: 8  weeks   Pt will be able to stoop and bend pain free   Be able to shop pain free   Be able to get up from the floor pain free     Plan   Plan of care Certification: 4/17/2024 to 6/16/2024.     Outpatient Physical Therapy 2 times weekly for 8 weeks to include the following interventions: Neuromuscular Re-ed, Patient Education, Therapeutic Activities, Therapeutic Exercise, and Ultrasound. Plan of care reviewed with Tres Valero, PT.    Eladia Rodriguez, PTA  4/26/2024

## 2024-04-29 ENCOUNTER — CLINICAL SUPPORT (OUTPATIENT)
Dept: REHABILITATION | Facility: HOSPITAL | Age: 64
End: 2024-04-29
Payer: MEDICARE

## 2024-04-29 DIAGNOSIS — R29.898 DECREASED STRENGTH INVOLVING KNEE JOINT: ICD-10-CM

## 2024-04-29 DIAGNOSIS — M25.661 DECREASED RANGE OF MOTION (ROM) OF RIGHT KNEE: Primary | ICD-10-CM

## 2024-04-29 PROCEDURE — 97112 NEUROMUSCULAR REEDUCATION: CPT | Mod: PN,CQ

## 2024-04-29 PROCEDURE — 97530 THERAPEUTIC ACTIVITIES: CPT | Mod: PN,CQ

## 2024-04-29 NOTE — PROGRESS NOTES
Physical Therapy Treatment Note     Name: Maureen Easley  Clinic Number: [id number]    Therapy Diagnosis:   Encounter Diagnoses   Name Primary?    Decreased range of motion (ROM) of right knee Yes    Decreased strength involving knee joint      Physician: Emily Mcmullen FNP    Visit Date: 4/29/2024  Physician Orders: PT Eval and Treat    Medical Diagnosis from Referral: right knee pain   Evaluation Date: 4/17/2024  Updated Plan of Care Due : humana   Authorization Period Expiration: 6/17/2024   Plan of Care Expiration: humana   Visit # / Visits authorized: 4/ 9  PTA Visit #: 3    Time In: 232  Time Out: 312  Total Billable Time: 40 minutes    Precautions: Standard    Subjective     Pt reports: I'm hurting, but I think it's the weather.  She was compliant with home exercise program.  Response to previous treatment: sore  Functional change: ongoing    Pain: 8/10  Location: right knee      Objective     Range of motion   Flexion 123  Gravity assisted active range of motion -4  Quad lag with terminal knee extension -15    Maureen participated in neuromuscular re-education activities to improve: Coordination, Kinesthetic, Sense, and Proprioception for 20 minutes. The following activities were included:  Slant board x 2' with glut set  Swissball knee nerve glides x 15  Quad set x 20  Terminal knee extension 20 x 3#  Bilateral Hamstrings stretch on step x 5    Maureen participated in dynamic functional therapeutic activities to improve functional performance for 20  minutes, including:  Double support squats total gym x 20 to promote bending and squatting  Double support total gym calf raises total gym x 15 to promote reaching on tip toes  Biking x 5' to improve endurance with walking  Double support leg presses 20 x 65#  Right supine straight leg raise x 10 to promote getting in and out of tub/car    Home Exercises Provided and Patient Education Provided     Education provided: home exercise program     Written Home  Exercises Provided: Patient instructed to cont prior HEP.  Exercises were reviewed and Maureen was able to demonstrate them prior to the end of the session.  Maureen demonstrated good  understanding of the education provided.     See EMR under Patient Instructions for exercises provided prior visit.    Assessment     Patient progressing with extension range of motion, patient voicing decreased pain 5/10 after treatment   Muareen Is progressing well towards her goals.   Pt prognosis is Good.     Pt will continue to benefit from skilled outpatient physical therapy to address the deficits listed in the problem list box on initial evaluation, provide pt/family education and to maximize pt's level of independence in the home and community environment.     Pt's spiritual, cultural and educational needs considered and pt agreeable to plan of care and goals.     Anticipated barriers to physical therapy: compliance with home exercise program     Goals:  Short Term Goals: 4 weeks   Pt will be independent with home ex program   Pt will be able to increase range of motion right knee 0-125   Improve quad lag with terminal knee extension to 0 degrees   Be able to shop 20 min without her knee buckling   Be able to decrease pain down to 4/10      Long Term Goals: 8  weeks   Pt will be able to stoop and bend pain free   Be able to shop pain free   Be able to get up from the floor pain free     Plan   Plan of care Certification: 4/17/2024 to 6/16/2024.     Outpatient Physical Therapy 2 times weekly for 8 weeks to include the following interventions: Neuromuscular Re-ed, Patient Education, Therapeutic Activities, Therapeutic Exercise, and Ultrasound. Plan of care reviewed with Tres Valero, PT.    Eladia Rodriguez, PTA  4/29/2024

## 2024-05-01 ENCOUNTER — CLINICAL SUPPORT (OUTPATIENT)
Dept: REHABILITATION | Facility: HOSPITAL | Age: 64
End: 2024-05-01
Payer: MEDICARE

## 2024-05-01 DIAGNOSIS — M25.661 DECREASED RANGE OF MOTION (ROM) OF RIGHT KNEE: Primary | ICD-10-CM

## 2024-05-01 DIAGNOSIS — R29.898 DECREASED STRENGTH INVOLVING KNEE JOINT: ICD-10-CM

## 2024-05-01 PROCEDURE — 97530 THERAPEUTIC ACTIVITIES: CPT | Mod: PN,CQ

## 2024-05-01 PROCEDURE — 97112 NEUROMUSCULAR REEDUCATION: CPT | Mod: PN,CQ

## 2024-05-01 NOTE — PROGRESS NOTES
Physical Therapy Treatment Note     Name: Maureen Easley  Clinic Number: [id number]    Therapy Diagnosis:   Encounter Diagnoses   Name Primary?    Decreased range of motion (ROM) of right knee Yes    Decreased strength involving knee joint      Physician: Emily Mcmullen FNP    Visit Date: 5/1/2024  Physician Orders: PT Eval and Treat    Medical Diagnosis from Referral: right knee pain   Evaluation Date: 4/17/2024  Updated Plan of Care Due : humana   Authorization Period Expiration: 6/17/2024   Plan of Care Expiration: humana   Visit # / Visits authorized: 5/ 9  PTA Visit #: 4    Time In: 158  Time Out: 243  Total Billable Time: 45 minutes    Precautions: Standard    Subjective     Pt reports: I'm a little better today than yesterday. I still have some pain along the inside of me knee.  She was compliant with home exercise program.  Response to previous treatment: sore  Functional change: ongoing    Pain: 5/10  Location: right knee      Objective     Range of motion   Flexion 129  Gravity assisted active range of motion -3  Quad lag with terminal knee extension -11    Maureen participated in neuromuscular re-education activities to improve: Coordination, Kinesthetic, Sense, and Proprioception for 20 minutes. The following activities were included:  Slant board x 2' with glut set  Swissball knee nerve glides x 15  Quad set x 20  Terminal knee extension 20 x 4#  Bilateral Hamstrings stretch on step x 5    Maureen participated in dynamic functional therapeutic activities to improve functional performance for 25 minutes, including:  Double support squats total gym x 20 to promote bending and squatting  Double support total gym calf raises total gym x 15 to promote reaching on tip toes  Biking x 5' to improve endurance with walking  Double support leg presses 20 x 70#  Right supine straight leg raise x 10 to promote getting in and out of tub/car    Home Exercises Provided and Patient Education Provided     Education  provided: home exercise program     Written Home Exercises Provided: Patient instructed to cont prior HEP.  Exercises were reviewed and Maureen was able to demonstrate them prior to the end of the session.  Maureen demonstrated good  understanding of the education provided.     See EMR under Patient Instructions for exercises provided prior visit.    Assessment   Patient had improved range of motion in all planes this session   Maureen Is progressing well towards her goals.   Pt prognosis is Good.     Pt will continue to benefit from skilled outpatient physical therapy to address the deficits listed in the problem list box on initial evaluation, provide pt/family education and to maximize pt's level of independence in the home and community environment.     Pt's spiritual, cultural and educational needs considered and pt agreeable to plan of care and goals.     Anticipated barriers to physical therapy: compliance with home exercise program     Goals:  Short Term Goals: 4 weeks   Pt will be independent with home ex program   Pt will be able to increase range of motion right knee 0-125   Improve quad lag with terminal knee extension to 0 degrees   Be able to shop 20 min without her knee buckling   Be able to decrease pain down to 4/10      Long Term Goals: 8  weeks   Pt will be able to stoop and bend pain free   Be able to shop pain free   Be able to get up from the floor pain free     Plan   Plan of care Certification: 4/17/2024 to 6/16/2024.     Outpatient Physical Therapy 2 times weekly for 8 weeks to include the following interventions: Neuromuscular Re-ed, Patient Education, Therapeutic Activities, Therapeutic Exercise, and Ultrasound. Plan of care reviewed with Tres Valero, PT.    Eladia Rodriguez, PTA  5/1/2024

## 2024-05-03 ENCOUNTER — OFFICE VISIT (OUTPATIENT)
Dept: ORTHOPEDICS | Facility: CLINIC | Age: 64
End: 2024-05-03
Payer: MEDICARE

## 2024-05-03 VITALS — HEIGHT: 66 IN | BODY MASS INDEX: 37.77 KG/M2 | WEIGHT: 235 LBS

## 2024-05-03 DIAGNOSIS — M25.561 CHRONIC PAIN OF RIGHT KNEE: ICD-10-CM

## 2024-05-03 DIAGNOSIS — G89.29 CHRONIC PAIN OF RIGHT KNEE: ICD-10-CM

## 2024-05-03 PROCEDURE — 20610 DRAIN/INJ JOINT/BURSA W/O US: CPT | Mod: PBBFAC,RT

## 2024-05-03 PROCEDURE — 99213 OFFICE O/P EST LOW 20 MIN: CPT | Mod: S$PBB,25,,

## 2024-05-03 PROCEDURE — 99214 OFFICE O/P EST MOD 30 MIN: CPT | Mod: PBBFAC

## 2024-05-03 PROCEDURE — 99999PBSHW PR PBB SHADOW TECHNICAL ONLY FILED TO HB: Mod: PBBFAC,,,

## 2024-05-03 PROCEDURE — 3008F BODY MASS INDEX DOCD: CPT | Mod: CPTII,,,

## 2024-05-03 PROCEDURE — 4010F ACE/ARB THERAPY RXD/TAKEN: CPT | Mod: CPTII,,,

## 2024-05-03 PROCEDURE — 1159F MED LIST DOCD IN RCRD: CPT | Mod: CPTII,,,

## 2024-05-03 PROCEDURE — 3044F HG A1C LEVEL LT 7.0%: CPT | Mod: CPTII,,,

## 2024-05-03 RX ORDER — BUPIVACAINE HYDROCHLORIDE 2.5 MG/ML
1 INJECTION, SOLUTION EPIDURAL; INFILTRATION; INTRACAUDAL
Status: DISCONTINUED | OUTPATIENT
Start: 2024-05-03 | End: 2024-05-03 | Stop reason: HOSPADM

## 2024-05-03 RX ORDER — TRIAMCINOLONE ACETONIDE 40 MG/ML
40 INJECTION, SUSPENSION INTRA-ARTICULAR; INTRAMUSCULAR
Status: DISCONTINUED | OUTPATIENT
Start: 2024-05-03 | End: 2024-05-03 | Stop reason: HOSPADM

## 2024-05-03 RX ADMIN — TRIAMCINOLONE ACETONIDE 40 MG: 40 INJECTION, SUSPENSION INTRA-ARTICULAR; INTRAMUSCULAR at 09:05

## 2024-05-03 RX ADMIN — BUPIVACAINE HYDROCHLORIDE 1 ML: 2.5 INJECTION, SOLUTION EPIDURAL; INFILTRATION; INTRACAUDAL at 09:05

## 2024-05-03 NOTE — PROGRESS NOTES
CC:   Chief Complaint   Patient presents with    Right Knee - Follow-up, Pain          Maureen Easley is a 63 y.o. female seen today for follow up of Follow-up and Pain of the Right Knee   Patient is known to Dr. Mcfarlane for previous right knee arthroscopy in 2018.  Patient reports right knee pain beginning a proximally 5 months ago.  She denies any fall or injury at the onset of her symptoms.  She reports popping and clicking her knee specifically when riding her stationary bike.  Patient saw [facility] a proximally 1 month ago where x-rays showed progression of tricompartmental DJD changes compared to 2018 x-ray, no acute fracture or dislocation.  Patient has completed 2 weeks of physical therapy, she has not noticed improvement in popping clicking. Patient was referred to [facility] for further evaluation.  Patient describes right knee pain that is intermittent and mostly located to the front of her knee.  She is more concerned today with the continuous popping and clicking during activities.  No other complaints today.      PAST MEDICAL HISTORY:   Past Medical History:   Diagnosis Date    Bipolar disorder     Depression     Hyperlipidemia     Hypertension         PAST SURGICAL HISTORY:   Past Surgical History:   Procedure Laterality Date    COLONOSCOPY  08/21/2019    hx polyps    HYSTERECTOMY  1998        ALLERGIES:   Review of patient's allergies indicates:   Allergen Reactions    Sulfamethoxazole-trimethoprim      Other reaction(s): Unknown    Clarithromycin Rash        MEDICATIONS :    Current Outpatient Medications:     albuterol (PROVENTIL/VENTOLIN HFA) 90 mcg/actuation inhaler, Inhale 2 puffs into the lungs every 4 (four) hours as needed., Disp: , Rfl:     ARIPiprazole (ABILIFY) 10 MG Tab, Take 10 mg by mouth every morning., Disp: , Rfl:     aspirin (ECOTRIN) 81 MG EC tablet, 1 tablet., Disp: , Rfl:     atorvastatin (LIPITOR) 10 MG tablet, Take 1 tablet (10 mg total) by  mouth every evening., Disp: 90 tablet, Rfl: 1    DULoxetine (CYMBALTA) 30 MG capsule, Take 30 mg by mouth., Disp: , Rfl:     esomeprazole (NEXIUM) 40 MG capsule, Take 1 capsule (40 mg total) by mouth before breakfast., Disp: 90 capsule, Rfl: 0    estradioL (ESTRACE) 0.01 % (0.1 mg/gram) vaginal cream, Place 1 g vaginally twice a week., Disp: 42.5 g, Rfl: 11    fexofenadine (ALLEGRA ALLERGY) 180 MG tablet, Take 1 tablet (180 mg total) by mouth once daily., Disp: 90 tablet, Rfl: 0    fluticasone propionate (FLONASE) 50 mcg/actuation nasal spray, SPRAY 2 SPRAYS INTO EACH NOSTRIL EVERY DAY, Disp: 48 mL, Rfl: 0    gabapentin (NEURONTIN) 300 MG capsule, Take 1 capsule (300 mg total) by mouth 3 (three) times daily., Disp: 90 capsule, Rfl: 11    hydroCHLOROthiazide (HYDRODIURIL) 25 MG tablet, Take 1 tablet (25 mg total) by mouth once daily., Disp: 90 tablet, Rfl: 0    HYDROcodone-acetaminophen (NORCO) 5-325 mg per tablet, , Disp: , Rfl:     lisinopriL (PRINIVIL,ZESTRIL) 20 MG tablet, Take 1 tablet (20 mg total) by mouth once daily., Disp: 90 tablet, Rfl: 0    metroNIDAZOLE (METROGEL) 0.75 % gel, Apply topically 2 (two) times daily., Disp: 45 g, Rfl: 5    mirtazapine (REMERON) 15 MG tablet, Take 7.5 mg by mouth., Disp: , Rfl:     montelukast (SINGULAIR) 10 mg tablet, TAKE 1 TABLET EVERY EVENING, Disp: 90 tablet, Rfl: 3    naproxen (NAPROSYN) 375 MG tablet, Take 1 tablet (375 mg total) by mouth 2 (two) times daily as needed., Disp: 60 tablet, Rfl: 0    pramipexole (MIRAPEX) 0.5 MG tablet, Take 1 tablet (0.5 mg total) by mouth every evening., Disp: 90 tablet, Rfl: 1    traZODone (DESYREL) 100 MG tablet, TAKE 1 TABLET BY MOUTH EVERY DAY AT BEDTIME AS DIRECTED, Disp: , Rfl:      SOCIAL HISTORY:   Social History     Socioeconomic History    Marital status:    Tobacco Use    Smoking status: Never    Smokeless tobacco: Never   Substance and Sexual Activity    Alcohol use: Never    Drug use: Never    Sexual activity: Yes      Partners: Male     Birth control/protection: None     Social Determinants of Health     Financial Resource Strain: Low Risk  (4/17/2024)    Overall Financial Resource Strain (CARDIA)     Difficulty of Paying Living Expenses: Not hard at all   Food Insecurity: No Food Insecurity (4/17/2024)    Hunger Vital Sign     Worried About Running Out of Food in the Last Year: Never true     Ran Out of Food in the Last Year: Never true   Transportation Needs: No Transportation Needs (4/17/2024)    PRAPARE - Transportation     Lack of Transportation (Medical): No     Lack of Transportation (Non-Medical): No   Physical Activity: Insufficiently Active (4/17/2024)    Exercise Vital Sign     Days of Exercise per Week: 1 day     Minutes of Exercise per Session: 10 min   Stress: Stress Concern Present (4/17/2024)    [employer] [place] [employer] - Occupational Stress Questionnaire     Feeling of Stress : Very much   Housing Stability: Unknown (4/17/2024)    Housing Stability Vital Sign     Unable to Pay for Housing in the Last Year: No        FAMILY HISTORY:   Family History   Problem Relation Name Age of Onset    Hypertension Other      Breast cancer Neg Hx      Colon cancer Neg Hx      Ovarian cancer Neg Hx            PHYSICAL EXAM:      There were no vitals filed for this visit.  Body mass index is 37.93 kg/m².    GENERAL: Well-developed, well-nourished female . The patient is alert, oriented and cooperative.    EXTREMITIES:  Right knee was skin clean dry and intact, no soft tissue swelling or joint effusion appreciated on exam today, mild tenderness to palpation along anterior and lateral joint lines, range of motion from 0-100 degrees without pain, knee is stable to varus and valgus stress testing, positive Thessaly testing, negative Bo's testing, neurovascularly intact      RADIOGRAPHIC FINDINGS:   None today       Patient Active Problem List    Diagnosis Date Noted    Chronic pain of right knee 04/25/2024     Decreased range of motion (ROM) of right knee 04/25/2024    Decreased strength involving knee joint 04/25/2024    Severe obesity (BMI 35.0-39.9) with comorbidity 09/18/2023    Essential hypertension 05/24/2023    Hyperlipidemia 05/24/2023    Type 2 macular telangiectasis, bilateral 08/13/2018     IMPRESSION AND PLAN:  Chronic right knee pain.  History of knee arthroscopy in 2018.  Discussed all treatment options today with the patient.  Continue working with physical therapy.  Discussed steroid injection today, see procedural note for details.  Discussed follow up after completion of physical therapy.  If patient continues to have pain or symptoms worsen after completion of physical therapy, we will consider MRI.  Follow up sooner as needed.        No follow-ups on file.       Kalina Stroud PA-C      (Subject to voice recognition error, transcription service not allowed)

## 2024-05-03 NOTE — PROCEDURES
Large Joint Aspiration/Injection: R knee    Date/Time: 5/3/2024 9:20 AM    Performed by: Kalina Stroud PA  Authorized by: Kalina Stroud PA    Consent Done?:  Yes (Verbal)  Indications:  Arthritis and pain  Site marked: the procedure site was marked      Details:  Needle Size:  22 G  Approach:  Anterolateral  Location:  Knee  Site:  R knee  Medications:  1 mL BUPivacaine (PF) 0.25% (2.5 mg/ml) 0.25 % (2.5 mg/mL); 40 mg triamcinolone acetonide 40 mg/mL  Patient tolerance:  Patient tolerated the procedure well with no immediate complications

## 2024-05-06 ENCOUNTER — CLINICAL SUPPORT (OUTPATIENT)
Dept: REHABILITATION | Facility: HOSPITAL | Age: 64
End: 2024-05-06
Payer: MEDICARE

## 2024-05-06 DIAGNOSIS — R29.898 DECREASED STRENGTH INVOLVING KNEE JOINT: ICD-10-CM

## 2024-05-06 DIAGNOSIS — G89.29 CHRONIC PAIN OF RIGHT KNEE: Primary | ICD-10-CM

## 2024-05-06 DIAGNOSIS — M25.561 CHRONIC PAIN OF RIGHT KNEE: Primary | ICD-10-CM

## 2024-05-06 DIAGNOSIS — M25.661 DECREASED RANGE OF MOTION (ROM) OF RIGHT KNEE: ICD-10-CM

## 2024-05-06 PROCEDURE — 97530 THERAPEUTIC ACTIVITIES: CPT | Mod: PN

## 2024-05-06 PROCEDURE — 97112 NEUROMUSCULAR REEDUCATION: CPT | Mod: PN

## 2024-05-06 NOTE — PROGRESS NOTES
Physical Therapy Treatment Note     Name: Maureen Easley  Clinic Number: [id number]    Therapy Diagnosis:   Encounter Diagnoses   Name Primary?    Decreased range of motion (ROM) of right knee     Decreased strength involving knee joint     Chronic pain of right knee Yes     Physician: Emily Mcmullen FNP    Visit Date: 5/6/2024  Physician Orders: PT Eval and Treat    Medical Diagnosis from Referral: right knee pain   Evaluation Date: 4/17/2024  Updated Plan of Care Due : humana   Authorization Period Expiration: 6/17/2024   Plan of Care Expiration: humana   Visit # / Visits authorized: 6/ 9  PTA Visit #:     Time In: 1355  Time Out: 1445  Total Billable Time: 45 minutes    Precautions: Standard    Subjective     Pt reports: feeling much better after getting injected .    She was compliant with home exercise program.  Response to previous treatment: sore  Functional change: ongoing    Pain: 4/10  Location: right knee      Objective     Range of motion   Flexion 136  Gravity assisted active range of motion -4  Quad lag with terminal knee extension -9  Maureen participated in neuromuscular re-education activities to improve: Coordination, Kinesthetic, Sense, and Proprioception for 20 minutes. The following activities were included:      Slant board x 2' with glut set  Swissball knee nerve glides x 15  Quad set x 20  Terminal knee extension 20 x 4#  Bilateral Hamstrings stretch on step x 5    Maureen participated in dynamic functional therapeutic activities to improve functional performance for 25 minutes, including:    Double support squats total gym x 20 to promote bending and squatting  Double support total gym calf raises total gym x 15 to promote reaching on tip toes  Biking x 5' to improve endurance with walking  Double support leg presses 20 x 70#  Right supine straight leg raise x 10 to promote getting in and out of tub/car    Home Exercises Provided and Patient Education Provided     Education provided: home  exercise program     Written Home Exercises Provided: Patient instructed to cont prior HEP.  Exercises were reviewed and Maureen was able to demonstrate them prior to the end of the session.  Maureen demonstrated good  understanding of the education provided.     See EMR under Patient Instructions for exercises provided prior visit.    Assessment   Patient voices decreased pain since injection     Maureen Is progressing well towards her goals.   Pt prognosis is Good.     Pt will continue to benefit from skilled outpatient physical therapy to address the deficits listed in the problem list box on initial evaluation, provide pt/family education and to maximize pt's level of independence in the home and community environment.     Pt's spiritual, cultural and educational needs considered and pt agreeable to plan of care and goals.     Anticipated barriers to physical therapy: compliance with home exercise program     Goals:  Short Term Goals: 4 weeks   Pt will be independent with home ex program   Pt will be able to increase range of motion right knee 0-125   Improve quad lag with terminal knee extension to 0 degrees   Be able to shop 20 min without her knee buckling   Be able to decrease pain down to 4/10      Long Term Goals: 8  weeks   Pt will be able to stoop and bend pain free   Be able to shop pain free   Be able to get up from the floor pain free     Plan   Plan of care Certification: 4/17/2024 to 6/16/2024.     Outpatient Physical Therapy 2 times weekly for 8 weeks to include the following interventions: Neuromuscular Re-ed, Patient Education, Therapeutic Activities, Therapeutic Exercise, and Ultrasound.      Plan of care has been reestablished with Kaylie Rodriguez LPTA, Lindsey Munguia LPTA, Tianna Rivera LPTA  and Ingrid Jackson LPTA.      Tres Valero, PT  5/6/2024

## 2024-05-08 ENCOUNTER — CLINICAL SUPPORT (OUTPATIENT)
Dept: REHABILITATION | Facility: HOSPITAL | Age: 64
End: 2024-05-08
Payer: MEDICARE

## 2024-05-08 DIAGNOSIS — R29.898 DECREASED STRENGTH INVOLVING KNEE JOINT: ICD-10-CM

## 2024-05-08 DIAGNOSIS — M25.661 DECREASED RANGE OF MOTION (ROM) OF RIGHT KNEE: Primary | ICD-10-CM

## 2024-05-08 PROCEDURE — 97112 NEUROMUSCULAR REEDUCATION: CPT | Mod: PN,CQ

## 2024-05-08 PROCEDURE — 97530 THERAPEUTIC ACTIVITIES: CPT | Mod: PN,CQ

## 2024-05-08 NOTE — PROGRESS NOTES
Physical Therapy Treatment Note     Name: Maureen Easley  Clinic Number: [id number]    Therapy Diagnosis:   Encounter Diagnoses   Name Primary?    Decreased range of motion (ROM) of right knee Yes    Decreased strength involving knee joint      Physician: Emily Mcmullen FNP    Visit Date: 5/8/2024  Physician Orders: PT Eval and Treat    Medical Diagnosis from Referral: right knee pain   Evaluation Date: 4/17/2024  Updated Plan of Care Due : humana   Authorization Period Expiration: 6/17/2024   Plan of Care Expiration: humana   Visit # / Visits authorized: 7/ 9  PTA Visit #: 1    Time In: 115  Time Out: 200  Total Billable Time: 45 minutes    Precautions: Standard    Subjective     Pt reports: My knee is still popping and clicking, its been hurting more.  She was compliant with home exercise program.  Response to previous treatment: sore  Functional change: ongoing    Pain: 5/10  Location: right knee      Objective     Range of motion   Flexion 133  Gravity assisted active range of motion -4  Quad lag with terminal knee extension -8    Maureen participated in neuromuscular re-education activities to improve: Coordination, Kinesthetic, Sense, and Proprioception for 20 minutes. The following activities were included:  Slant board x 2' with glut set  Swissball knee nerve glides x 15  Quad set x 20  Terminal knee extension 20 x 4#  Bilateral Hamstrings stretch on step x 5    Maureen participated in dynamic functional therapeutic activities to improve functional performance for 25 minutes, including:    Double support squats total gym x 20 to promote bending and squatting  Double support total gym calf raises total gym x 15 to promote reaching on tip toes  Biking x 5' to improve endurance with walking  Double support leg presses 20 x 75# to promote bending and squatting  Double support calf presses 20 x 75# to promote reaching on tip toes  Right supine straight leg raise x 10 to promote getting in and out of  tub/car    Home Exercises Provided and Patient Education Provided     Education provided: home exercise program     Written Home Exercises Provided: Patient instructed to cont prior HEP.  Exercises were reviewed and Maureen was able to demonstrate them prior to the end of the session.  Maureen demonstrated good  understanding of the education provided.     See EMR under Patient Instructions for exercises provided prior visit.    Assessment   Patient had audible popping in right knee with terminal knee extension's with pain noted along patellar tendon  Maureen Is progressing well towards her goals.   Pt prognosis is Good.     Pt will continue to benefit from skilled outpatient physical therapy to address the deficits listed in the problem list box on initial evaluation, provide pt/family education and to maximize pt's level of independence in the home and community environment.     Pt's spiritual, cultural and educational needs considered and pt agreeable to plan of care and goals.     Anticipated barriers to physical therapy: compliance with home exercise program     Goals:  Short Term Goals: 4 weeks   Pt will be independent with home ex program -met  Pt will be able to increase range of motion right knee 0-125   Improve quad lag with terminal knee extension to 0 degrees   Be able to shop 20 min without her knee buckling   Be able to decrease pain down to 4/10      Long Term Goals: 8  weeks   Pt will be able to stoop and bend pain free   Be able to shop pain free   Be able to get up from the floor pain free     Plan   Plan of care Certification: 4/17/2024 to 6/16/2024.     Outpatient Physical Therapy 2 times weekly for 8 weeks to include the following interventions: Neuromuscular Re-ed, Patient Education, Therapeutic Activities, Therapeutic Exercise, and Ultrasound.      Plan of care has been reestablished with Kaylie Rodriguez LPTA, Lindsey Munguia LPTA, Tianna Rivera LPTA  and Ingrid Jackson LPTA.      Eladia Rodriguez,  PTA  5/8/2024

## 2024-05-14 ENCOUNTER — CLINICAL SUPPORT (OUTPATIENT)
Dept: REHABILITATION | Facility: HOSPITAL | Age: 64
End: 2024-05-14
Payer: MEDICARE

## 2024-05-14 DIAGNOSIS — R29.898 DECREASED STRENGTH INVOLVING KNEE JOINT: ICD-10-CM

## 2024-05-14 DIAGNOSIS — M25.661 DECREASED RANGE OF MOTION (ROM) OF RIGHT KNEE: Primary | ICD-10-CM

## 2024-05-14 PROCEDURE — 97530 THERAPEUTIC ACTIVITIES: CPT | Mod: PN,CQ

## 2024-05-14 PROCEDURE — 97112 NEUROMUSCULAR REEDUCATION: CPT | Mod: PN,CQ

## 2024-05-14 NOTE — PROGRESS NOTES
"  Physical Therapy Treatment Note     Name: Maureen Easley  Clinic Number: [id number]    Therapy Diagnosis:   Encounter Diagnoses   Name Primary?    Decreased range of motion (ROM) of right knee Yes    Decreased strength involving knee joint      Physician: Emily Mcmullen FNP    Visit Date: 5/14/2024  Physician Orders: PT Eval and Treat    Medical Diagnosis from Referral: right knee pain   Evaluation Date: 4/17/2024  Updated Plan of Care Due : humana   Authorization Period Expiration: 6/17/2024   Plan of Care Expiration: humana   Visit # / Visits authorized: 8/ 9  PTA Visit #: 1    Time In: 200  Time Out: 245  Total Billable Time: 45 minutes    Precautions: Standard    Subjective     Pt reports: I'm still having the popping in that knee.  She was compliant with home exercise program.  Response to previous treatment: sore  Functional change: ongoing    Pain: 5/10  Location: right knee      Objective     Range of motion   Flexion 129  Gravity assisted active range of motion -5  Quad lag with terminal knee extension -8    Maureen participated in neuromuscular re-education activities to improve: Coordination, Kinesthetic, Sense, and Proprioception for 20 minutes. The following activities were included:  Slant board x 2' with glut set  Swissball knee nerve glides x 15  Quad set x 20  Terminal knee extension 20 x 5#  Bilateral Hamstrings stretch on step x 5    Maureen participated in dynamic functional therapeutic activities to improve functional performance for 25 minutes, including:  Right forward step ups 6" x 10 to promote community stairclimbing  Double support squats total gym x 20 to promote bending and squatting  Double support total gym calf raises total gym x 15 to promote reaching on tip toes  Biking x 5' to improve endurance with walking  Double support leg presses 20 x 80# to promote bending and squatting  Double support calf presses 20 x 80# to promote reaching on tip toes  Right supine straight leg raise x 10 " to promote getting in and out of tub/car    Home Exercises Provided and Patient Education Provided     Education provided: home exercise program     Written Home Exercises Provided: Patient instructed to cont prior HEP.  Exercises were reviewed and Maureen was able to demonstrate them prior to the end of the session.  Maureen demonstrated good  understanding of the education provided.     See EMR under Patient Instructions for exercises provided prior visit.    Assessment   Patient progressing with lower extremity strengthening able to increase weight with leg presses  Maureen Is progressing well towards her goals.   Pt prognosis is Good.     Pt will continue to benefit from skilled outpatient physical therapy to address the deficits listed in the problem list box on initial evaluation, provide pt/family education and to maximize pt's level of independence in the home and community environment.     Pt's spiritual, cultural and educational needs considered and pt agreeable to plan of care and goals.     Anticipated barriers to physical therapy: compliance with home exercise program     Goals:  Short Term Goals: 4 weeks   Pt will be independent with home ex program -met  Pt will be able to increase range of motion right knee 0-125   Improve quad lag with terminal knee extension to 0 degrees   Be able to shop 20 min without her knee buckling   Be able to decrease pain down to 4/10      Long Term Goals: 8  weeks   Pt will be able to stoop and bend pain free   Be able to shop pain free   Be able to get up from the floor pain free     Plan   Plan of care Certification: 4/17/2024 to 6/16/2024.     Outpatient Physical Therapy 2 times weekly for 8 weeks to include the following interventions: Neuromuscular Re-ed, Patient Education, Therapeutic Activities, Therapeutic Exercise, and Ultrasound.      Plan of care has been reestablished with Kaylie Rodriguez LPTA, Lindsey Munguia LPTA, Tianna Rivera LPTA  and Ingrid Jackson LPTA.      Eladia CASE  Michael, PTA  5/14/2024

## 2024-05-16 ENCOUNTER — CLINICAL SUPPORT (OUTPATIENT)
Dept: REHABILITATION | Facility: HOSPITAL | Age: 64
End: 2024-05-16
Payer: MEDICARE

## 2024-05-16 DIAGNOSIS — M25.661 DECREASED RANGE OF MOTION (ROM) OF RIGHT KNEE: Primary | ICD-10-CM

## 2024-05-16 DIAGNOSIS — R29.898 DECREASED STRENGTH INVOLVING KNEE JOINT: ICD-10-CM

## 2024-05-16 PROCEDURE — 97112 NEUROMUSCULAR REEDUCATION: CPT | Mod: PN,CQ

## 2024-05-16 PROCEDURE — 97530 THERAPEUTIC ACTIVITIES: CPT | Mod: PN,CQ

## 2024-05-16 NOTE — PROGRESS NOTES
Physical Therapy Treatment Note     Name: Maureen Easley  Clinic Number: [id number]    Therapy Diagnosis:   Encounter Diagnoses   Name Primary?    Decreased range of motion (ROM) of right knee Yes    Decreased strength involving knee joint      Physician: Emily Mcmullen FNP    Visit Date: 5/16/2024  Physician Orders: PT Eval and Treat    Medical Diagnosis from Referral: right knee pain   Evaluation Date: 4/17/2024  Updated Plan of Care Due : humana   Authorization Period Expiration: 6/17/2024   Plan of Care Expiration: humana   Visit # / Visits authorized: 9/ 9  PTA Visit #: 2    Time In: 155  Time Out: 235  Total Billable Time: 40 minutes    Precautions: Standard    Subjective     Pt reports: I'm about the same with the knee popping  She was compliant with home exercise program.  Response to previous treatment: sore  Functional change: ongoing    Pain: 5/10  Location: right knee      Objective     Range of motion   Flexion 129  Gravity assisted active range of motion -6  Quad lag with terminal knee extension -9    Maureen participated in neuromuscular re-education activities to improve: Coordination, Kinesthetic, Sense, and Proprioception for 20 minutes. The following activities were included:  Slant board x 2' with glut set  Swissball knee nerve glides x 15  Quad set x 20  Terminal knee extension 20 x 5#  Bilateral Hamstrings stretch on step x 5    Maureen participated in dynamic functional therapeutic activities to improve functional performance for 20 minutes, including:    Double support squats total gym x 20 to promote bending and squatting  Double support total gym calf raises total gym x 15 to promote reaching on tip toes  Biking x 5' to improve endurance with walking  Double support leg presses 20 x 80# to promote bending and squatting  Double support calf presses 20 x 80# to promote reaching on tip toes  Right supine straight leg raise x 10 to promote getting in and out of tub/car    Home Exercises Provided  and Patient Education Provided     Education provided: home exercise program     Written Home Exercises Provided: Patient instructed to cont prior HEP.  Exercises were reviewed and Maureen was able to demonstrate them prior to the end of the session.  Maureen demonstrated good  understanding of the education provided.     See EMR under Patient Instructions for exercises provided prior visit.    Assessment   Patient has progressed and met 3/6 STG's outlined in evaluation. Patient instructed to be consistent with home exercise program.  Maureen Is progressing well towards her goals.   Pt prognosis is Good.     Pt will continue to benefit from skilled outpatient physical therapy to address the deficits listed in the problem list box on initial evaluation, provide pt/family education and to maximize pt's level of independence in the home and community environment.     Pt's spiritual, cultural and educational needs considered and pt agreeable to plan of care and goals.     Anticipated barriers to physical therapy: compliance with home exercise program     Goals:  Short Term Goals: 4 weeks   Pt will be independent with home ex program -met  Pt will be able to increase range of motion right knee 0-125   Improve quad lag with terminal knee extension to 0 degrees   Be able to shop 20 min without her knee buckling -met  Be able to decrease pain down to 4/10 -met     Long Term Goals: 8  weeks   Pt will be able to stoop and bend pain free   Be able to shop pain free-met    Be able to get up from the floor pain free     Plan   Will d/c to home exercise program, see physical therapist d/c note.     Eladia Rodriguez, PTA  5/16/2024

## 2024-05-17 PROBLEM — M25.661 DECREASED RANGE OF MOTION (ROM) OF RIGHT KNEE: Status: RESOLVED | Noted: 2024-04-25 | Resolved: 2024-05-17

## 2024-05-17 PROBLEM — R29.898 DECREASED STRENGTH INVOLVING KNEE JOINT: Status: RESOLVED | Noted: 2024-04-25 | Resolved: 2024-05-17

## 2024-05-17 NOTE — PLAN OF CARE
Physical Therapy Treatment Note      Name: Maureen Easley  Clinic Number: [id number]     Therapy Diagnosis:        Encounter Diagnoses   Name Primary?    Decreased range of motion (ROM) of right knee Yes    Decreased strength involving knee joint        Physician: Emily Mcmullen FNP     Visit Date: 5/16/2024  Physician Orders: PT Eval and Treat    Medical Diagnosis from Referral: right knee pain   Evaluation Date: 4/17/2024  Updated Plan of Care Due : humana   Authorization Period Expiration: 6/17/2024   Plan of Care Expiration: humana   Visit # / Visits authorized: 9/ 9  PTA Visit #: 2     Time In: 155  Time Out: 235  Total Billable Time: 40 minutes     Precautions: Standard     Subjective      Pt reports: I'm about the same with the knee popping  She was compliant with home exercise program.  Response to previous treatment: sore  Functional change: ongoing     Pain: 5/10  Location: right knee       Objective      Range of motion   Flexion 129  Gravity assisted active range of motion -6  Quad lag with terminal knee extension -9     Maureen participated in neuromuscular re-education activities to improve: Coordination, Kinesthetic, Sense, and Proprioception for 20 minutes. The following activities were included:  Slant board x 2' with glut set  Swissball knee nerve glides x 15  Quad set x 20  Terminal knee extension 20 x 5#  Bilateral Hamstrings stretch on step x 5     Maureen participated in dynamic functional therapeutic activities to improve functional performance for 20 minutes, including:     Double support squats total gym x 20 to promote bending and squatting  Double support total gym calf raises total gym x 15 to promote reaching on tip toes  Biking x 5' to improve endurance with walking  Double support leg presses 20 x 80# to promote bending and squatting  Double support calf presses 20 x 80# to promote reaching on tip toes  Right supine straight leg raise x 10 to promote getting in and out of  tub/car     Home Exercises Provided and Patient Education Provided      Education provided: home exercise program      Written Home Exercises Provided: Patient instructed to cont prior HEP.  Exercises were reviewed and Maureen was able to demonstrate them prior to the end of the session.  Maureen demonstrated good  understanding of the education provided.      See EMR under Patient Instructions for exercises provided prior visit.     Assessment   Patient has progressed and met 3/6 STG's outlined in evaluation. Patient instructed to be consistent with home exercise program.  Maureen Is progressing well towards her goals.   Pt prognosis is Good.      Pt will continue to benefit from skilled outpatient physical therapy to address the deficits listed in the problem list box on initial evaluation, provide pt/family education and to maximize pt's level of independence in the home and community environment.      Pt's spiritual, cultural and educational needs considered and pt agreeable to plan of care and goals.     Anticipated barriers to physical therapy: compliance with home exercise program      Goals:  Short Term Goals: 4 weeks   Pt will be independent with home ex program -met  Pt will be able to increase range of motion right knee 0-125   Improve quad lag with terminal knee extension to 0 degrees   Be able to shop 20 min without her knee buckling -met  Be able to decrease pain down to 4/10 -met     Long Term Goals: 8  weeks   Pt will be able to stoop and bend pain free   Be able to shop pain free-met    Be able to get up from the floor pain free      Plan           Outpatient Therapy Discharge Summary     Name: Maureen Easley  Clinic Number: [id number]    Therapy Diagnosis:   Encounter Diagnoses   Name Primary?    Decreased range of motion (ROM) of right knee Yes    Decreased strength involving knee joint      Physician: Emily Mcmullen FNP            Assessment    Goals:  Pt met goals     Discharge reason: Patient has met  all of his/her goals    Plan   This patient is discharged from Physical Therapy.   Tres Valero, PT

## 2024-05-21 ENCOUNTER — HOSPITAL ENCOUNTER (OUTPATIENT)
Dept: RADIOLOGY | Facility: HOSPITAL | Age: 64
Discharge: HOME | End: 2024-05-21
Attending: STUDENT IN AN ORGANIZED HEALTH CARE EDUCATION/TRAINING PROGRAM
Payer: MEDICARE

## 2024-05-21 ENCOUNTER — HOSPITAL ENCOUNTER (OUTPATIENT)
Dept: CARDIOLOGY | Facility: HOSPITAL | Age: 64
Discharge: HOME | End: 2024-05-21
Attending: STUDENT IN AN ORGANIZED HEALTH CARE EDUCATION/TRAINING PROGRAM
Payer: MEDICARE

## 2024-05-21 VITALS
HEART RATE: 76 BPM | BODY MASS INDEX: 37.93 KG/M2 | SYSTOLIC BLOOD PRESSURE: 143 MMHG | DIASTOLIC BLOOD PRESSURE: 60 MMHG | HEIGHT: 66 IN

## 2024-05-21 VITALS — WEIGHT: 235 LBS | HEIGHT: 66 IN | BODY MASS INDEX: 37.77 KG/M2

## 2024-05-21 DIAGNOSIS — R06.09 OTHER FORM OF DYSPNEA: ICD-10-CM

## 2024-05-21 DIAGNOSIS — R07.9 CHEST PAIN, UNSPECIFIED TYPE: ICD-10-CM

## 2024-05-21 LAB
AORTIC ROOT ANNULUS: 2.1 CM
AORTIC VALVE CUSP SEPERATION: 1.73 CM
AV INDEX (PROSTH): 0.72
AV MEAN GRADIENT: 4 MMHG
AV PEAK GRADIENT: 7 MMHG
AV VALVE AREA BY VELOCITY RATIO: 2.42 CM²
AV VALVE AREA: 2.52 CM²
AV VELOCITY RATIO: 0.69
BSA FOR ECHO PROCEDURE: 2.23 M2
CV ECHO LV RWT: 0.57 CM
CV STRESS BASE HR: 76 BPM
DIASTOLIC BLOOD PRESSURE: 60 MMHG
DOP CALC AO PEAK VEL: 1.36 M/S
DOP CALC AO VTI: 27.9 CM
DOP CALC LVOT AREA: 3.5 CM2
DOP CALC LVOT DIAMETER: 2.11 CM
DOP CALC LVOT PEAK VEL: 0.94 M/S
DOP CALC LVOT STROKE VOLUME: 70.25 CM3
DOP CALCLVOT PEAK VEL VTI: 20.1 CM
E WAVE DECELERATION TIME: 220.88 MSEC
E/A RATIO: 0.69
E/E' RATIO: 5.47 M/S
ECHO LV POSTERIOR WALL: 1.11 CM (ref 0.6–1.1)
FRACTIONAL SHORTENING: 47 % (ref 28–44)
INTERVENTRICULAR SEPTUM: 1 CM (ref 0.6–1.1)
IVC DIAMETER: 1.25 CM
LEFT ATRIUM VOLUME INDEX MOD: 18 ML/M2
LEFT ATRIUM VOLUME MOD: 38.45 CM3
LEFT INTERNAL DIMENSION IN SYSTOLE: 2.08 CM (ref 2.1–4)
LEFT VENTRICLE DIASTOLIC VOLUME INDEX: 31.1 ML/M2
LEFT VENTRICLE DIASTOLIC VOLUME: 66.55 ML
LEFT VENTRICLE MASS INDEX: 62 G/M2
LEFT VENTRICLE SYSTOLIC VOLUME INDEX: 6.6 ML/M2
LEFT VENTRICLE SYSTOLIC VOLUME: 14.1 ML
LEFT VENTRICULAR INTERNAL DIMENSION IN DIASTOLE: 3.92 CM (ref 3.5–6)
LEFT VENTRICULAR MASS: 132.9 G
LV LATERAL E/E' RATIO: 4.73 M/S
LV SEPTAL E/E' RATIO: 6.5 M/S
LVOT MG: 2.09 MMHG
LVOT MV: 0.68 CM/S
MV PEAK A VEL: 0.75 M/S
MV PEAK E VEL: 0.52 M/S
OHS CV CPX 1 MINUTE RECOVERY HEART RATE: 100 BPM
OHS CV CPX 85 PERCENT MAX PREDICTED HEART RATE MALE: 133
OHS CV CPX MAX PREDICTED HEART RATE: 156
OHS CV CPX PATIENT IS FEMALE: 1
OHS CV CPX PATIENT IS MALE: 0
OHS CV CPX PEAK DIASTOLIC BLOOD PRESSURE: 64 MMHG
OHS CV CPX PEAK HEAR RATE: 110 BPM
OHS CV CPX PEAK RATE PRESSURE PRODUCT: NORMAL
OHS CV CPX PEAK SYSTOLIC BLOOD PRESSURE: 149 MMHG
OHS CV CPX PERCENT MAX PREDICTED HEART RATE ACHIEVED: 73
OHS CV CPX RATE PRESSURE PRODUCT PRESENTING: NORMAL
OHS CV RV/LV RATIO: 0.84 CM
OHS LV EJECTION FRACTION SIMPSONS BIPLANE MOD: 55 %
PISA TR MAX VEL: 2.83 M/S
PV PEAK GRADIENT: 4 MMHG
PV PEAK VELOCITY: 1 M/S
RA PRESSURE ESTIMATED: 3 MMHG
RA VOL SYS: 27.67 ML
RIGHT ATRIAL AREA: 12.3 CM2
RIGHT VENTRICLE DIASTOLIC LENGTH: 6.3 CM
RIGHT VENTRICLE DIASTOLIC MID DIMENSION: 2.4 CM
RIGHT VENTRICULAR END-DIASTOLIC DIMENSION: 3.3 CM
RIGHT VENTRICULAR LENGTH IN DIASTOLE (APICAL 4-CHAMBER VIEW): 6.32 CM
RV MID DIAMA: 2.43 CM
RV TB RVSP: 6 MMHG
SYSTOLIC BLOOD PRESSURE: 143 MMHG
TDI LATERAL: 0.11 M/S
TDI SEPTAL: 0.08 M/S
TDI: 0.1 M/S
TR MAX PG: 32 MMHG
TRICUSPID ANNULAR PLANE SYSTOLIC EXCURSION: 2.11 CM
TV REST PULMONARY ARTERY PRESSURE: 35 MMHG
Z-SCORE OF LEFT VENTRICULAR DIMENSION IN END DIASTOLE: -5.63
Z-SCORE OF LEFT VENTRICULAR DIMENSION IN END SYSTOLE: -5.55

## 2024-05-21 PROCEDURE — A9500 TC99M SESTAMIBI: HCPCS | Performed by: STUDENT IN AN ORGANIZED HEALTH CARE EDUCATION/TRAINING PROGRAM

## 2024-05-21 PROCEDURE — 78452 HT MUSCLE IMAGE SPECT MULT: CPT | Mod: 26,,, | Performed by: STUDENT IN AN ORGANIZED HEALTH CARE EDUCATION/TRAINING PROGRAM

## 2024-05-21 PROCEDURE — 93017 CV STRESS TEST TRACING ONLY: CPT

## 2024-05-21 PROCEDURE — 78452 HT MUSCLE IMAGE SPECT MULT: CPT | Mod: TC

## 2024-05-21 PROCEDURE — 93306 TTE W/DOPPLER COMPLETE: CPT

## 2024-05-21 PROCEDURE — 93016 CV STRESS TEST SUPVJ ONLY: CPT | Mod: ,,, | Performed by: STUDENT IN AN ORGANIZED HEALTH CARE EDUCATION/TRAINING PROGRAM

## 2024-05-21 PROCEDURE — 93018 CV STRESS TEST I&R ONLY: CPT | Mod: ,,, | Performed by: STUDENT IN AN ORGANIZED HEALTH CARE EDUCATION/TRAINING PROGRAM

## 2024-05-21 PROCEDURE — 63600175 PHARM REV CODE 636 W HCPCS: Performed by: STUDENT IN AN ORGANIZED HEALTH CARE EDUCATION/TRAINING PROGRAM

## 2024-05-21 PROCEDURE — 93306 TTE W/DOPPLER COMPLETE: CPT | Mod: 26,,, | Performed by: STUDENT IN AN ORGANIZED HEALTH CARE EDUCATION/TRAINING PROGRAM

## 2024-05-21 RX ORDER — REGADENOSON 0.08 MG/ML
0.4 INJECTION, SOLUTION INTRAVENOUS ONCE
Status: COMPLETED | OUTPATIENT
Start: 2024-05-21 | End: 2024-05-21

## 2024-05-21 RX ORDER — TETRAKIS(2-METHOXYISOBUTYLISOCYANIDE)COPPER(I) TETRAFLUOROBORATE 1 MG/ML
12 INJECTION, POWDER, LYOPHILIZED, FOR SOLUTION INTRAVENOUS
Status: COMPLETED | OUTPATIENT
Start: 2024-05-21 | End: 2024-05-21

## 2024-05-21 RX ORDER — TETRAKIS(2-METHOXYISOBUTYLISOCYANIDE)COPPER(I) TETRAFLUOROBORATE 1 MG/ML
35 INJECTION, POWDER, LYOPHILIZED, FOR SOLUTION INTRAVENOUS
Status: COMPLETED | OUTPATIENT
Start: 2024-05-21 | End: 2024-05-21

## 2024-05-21 RX ADMIN — KIT FOR THE PREPARATION OF TECHNETIUM TC99M SESTAMIBI 12 MILLICURIE: 1 INJECTION, POWDER, LYOPHILIZED, FOR SOLUTION PARENTERAL at 08:05

## 2024-05-21 RX ADMIN — KIT FOR THE PREPARATION OF TECHNETIUM TC99M SESTAMIBI 35 MILLICURIE: 1 INJECTION, POWDER, LYOPHILIZED, FOR SOLUTION PARENTERAL at 09:05

## 2024-05-21 RX ADMIN — REGADENOSON 0.4 MG: 0.08 INJECTION, SOLUTION INTRAVENOUS at 09:05

## 2024-06-05 ENCOUNTER — OFFICE VISIT (OUTPATIENT)
Dept: ORTHOPEDICS | Facility: CLINIC | Age: 64
End: 2024-06-05
Payer: MEDICARE

## 2024-06-05 DIAGNOSIS — M25.562 CHRONIC PAIN OF LEFT KNEE: Primary | ICD-10-CM

## 2024-06-05 DIAGNOSIS — G89.29 CHRONIC PAIN OF LEFT KNEE: Primary | ICD-10-CM

## 2024-06-05 PROCEDURE — 4010F ACE/ARB THERAPY RXD/TAKEN: CPT | Mod: CPTII,,,

## 2024-06-05 PROCEDURE — 99213 OFFICE O/P EST LOW 20 MIN: CPT | Mod: PBBFAC

## 2024-06-05 PROCEDURE — 1159F MED LIST DOCD IN RCRD: CPT | Mod: CPTII,,,

## 2024-06-05 PROCEDURE — 99213 OFFICE O/P EST LOW 20 MIN: CPT | Mod: S$PBB,,,

## 2024-06-05 PROCEDURE — 3044F HG A1C LEVEL LT 7.0%: CPT | Mod: CPTII,,,

## 2024-06-05 NOTE — PROGRESS NOTES
CC:   Chief Complaint   Patient presents with    Right Knee - Pain          Maureen Easley is a 63 y.o. female seen today for follow up of Pain of the Right Knee  Patient last seen by me  on 05/03/2024 where she received an intra-articular steroid injection.  She has been participating in physical therapy and now has completed physical therapy program without much relief of her right knee pain.  She states she is continued to have popping and clicking with movement of her right knee.  She states that the steroid injection did help temporarily however pain is still present.  No new fall or injury.  No other complaints today.      PAST MEDICAL HISTORY:   Past Medical History:   Diagnosis Date    Bipolar disorder     Depression     Hyperlipidemia     Hypertension         PAST SURGICAL HISTORY:   Past Surgical History:   Procedure Laterality Date    COLONOSCOPY  08/21/2019    hx polyps    HYSTERECTOMY  1998        ALLERGIES:   Review of patient's allergies indicates:   Allergen Reactions    Sulfamethoxazole-trimethoprim      Other reaction(s): Unknown    Clarithromycin Rash        MEDICATIONS :    Current Outpatient Medications:     albuterol (PROVENTIL/VENTOLIN HFA) 90 mcg/actuation inhaler, Inhale 2 puffs into the lungs every 4 (four) hours as needed., Disp: , Rfl:     ARIPiprazole (ABILIFY) 10 MG Tab, Take 10 mg by mouth every morning., Disp: , Rfl:     aspirin (ECOTRIN) 81 MG EC tablet, 1 tablet., Disp: , Rfl:     atorvastatin (LIPITOR) 10 MG tablet, Take 1 tablet (10 mg total) by mouth every evening., Disp: 90 tablet, Rfl: 1    DULoxetine (CYMBALTA) 30 MG capsule, Take 30 mg by mouth., Disp: , Rfl:     esomeprazole (NEXIUM) 40 MG capsule, Take 1 capsule (40 mg total) by mouth before breakfast., Disp: 90 capsule, Rfl: 0    estradioL (ESTRACE) 0.01 % (0.1 mg/gram) vaginal cream, Place 1 g vaginally twice a week., Disp: 42.5 g, Rfl: 11    fexofenadine (ALLEGRA ALLERGY) 180 MG tablet, Take 1 tablet (180  mg total) by mouth once daily., Disp: 90 tablet, Rfl: 0    fluticasone propionate (FLONASE) 50 mcg/actuation nasal spray, SPRAY 2 SPRAYS INTO EACH NOSTRIL EVERY DAY, Disp: 48 mL, Rfl: 0    gabapentin (NEURONTIN) 300 MG capsule, Take 1 capsule (300 mg total) by mouth 3 (three) times daily., Disp: 90 capsule, Rfl: 11    hydroCHLOROthiazide (HYDRODIURIL) 25 MG tablet, Take 1 tablet (25 mg total) by mouth once daily., Disp: 90 tablet, Rfl: 0    HYDROcodone-acetaminophen (NORCO) 5-325 mg per tablet, , Disp: , Rfl:     lisinopriL (PRINIVIL,ZESTRIL) 20 MG tablet, Take 1 tablet (20 mg total) by mouth once daily., Disp: 90 tablet, Rfl: 0    metroNIDAZOLE (METROGEL) 0.75 % gel, Apply topically 2 (two) times daily., Disp: 45 g, Rfl: 5    mirtazapine (REMERON) 15 MG tablet, Take 7.5 mg by mouth., Disp: , Rfl:     montelukast (SINGULAIR) 10 mg tablet, TAKE 1 TABLET EVERY EVENING, Disp: 90 tablet, Rfl: 3    naproxen (NAPROSYN) 375 MG tablet, Take 1 tablet (375 mg total) by mouth 2 (two) times daily as needed., Disp: 60 tablet, Rfl: 0    pramipexole (MIRAPEX) 0.5 MG tablet, Take 1 tablet (0.5 mg total) by mouth every evening., Disp: 90 tablet, Rfl: 1    traZODone (DESYREL) 100 MG tablet, TAKE 1 TABLET BY MOUTH EVERY DAY AT BEDTIME AS DIRECTED, Disp: , Rfl:      SOCIAL HISTORY:   Social History     Socioeconomic History    Marital status:    Tobacco Use    Smoking status: Never    Smokeless tobacco: Never   Substance and Sexual Activity    Alcohol use: Never    Drug use: Never    Sexual activity: Yes     Partners: Male     Birth control/protection: None     Social Determinants of Health     Financial Resource Strain: Low Risk  (4/17/2024)    Overall Financial Resource Strain (CARDIA)     Difficulty of Paying Living Expenses: Not hard at all   Food Insecurity: No Food Insecurity (4/17/2024)    Hunger Vital Sign     Worried About Running Out of Food in the Last Year: Never true     Ran Out of Food in the Last Year: Never true    Transportation Needs: No Transportation Needs (4/17/2024)    PRAPARE - Transportation     Lack of Transportation (Medical): No     Lack of Transportation (Non-Medical): No   Physical Activity: Insufficiently Active (4/17/2024)    Exercise Vital Sign     Days of Exercise per Week: 1 day     Minutes of Exercise per Session: 10 min   Stress: Stress Concern Present (4/17/2024)    [employer] - Occupational Stress Questionnaire     Feeling of Stress : Very much   Housing Stability: Unknown (4/17/2024)    Housing Stability Vital Sign     Unable to Pay for Housing in the Last Year: No        FAMILY HISTORY:   Family History   Problem Relation Name Age of Onset    Hypertension Other      Breast cancer Neg Hx      Colon cancer Neg Hx      Ovarian cancer Neg Hx            PHYSICAL EXAM:      There were no vitals filed for this visit.  There is no height or weight on file to calculate BMI.    GENERAL: Well-developed, well-nourished female . The patient is alert, oriented and cooperative.    EXTREMITIES:  Right knee with skin clean dry and intact, tenderness to palpation over medial and lateral joint line, crepitus noted over patella with movement, range of motion from 0-100 degrees but not without pain, mildly positive Bo's testing, neurovascularly intact      RADIOGRAPHIC FINDINGS:   None today    Patient Active Problem List    Diagnosis Date Noted    Chronic pain of right knee 04/25/2024    Severe obesity (BMI 35.0-39.9) with comorbidity 09/18/2023    Essential hypertension 05/24/2023    Hyperlipidemia 05/24/2023    Type 2 macular telangiectasis, bilateral 08/13/2018     IMPRESSION AND PLAN:  Chronic right knee pain.  Patient has continued pain following intra-articular steroid injection in May as well as completing a physical therapy program.  Recommending MRI for further evaluation.  Discussed that we will call her with results and schedule her to see Dr. Mcfarlane.         No follow-ups  on file.       Kalina Stroud PA-C      (Subject to voice recognition error, transcription service not allowed)

## 2024-06-14 ENCOUNTER — HOSPITAL ENCOUNTER (OUTPATIENT)
Dept: RADIOLOGY | Facility: HOSPITAL | Age: 64
Discharge: HOME | End: 2024-06-14
Payer: MEDICARE

## 2024-06-14 DIAGNOSIS — M25.562 CHRONIC PAIN OF LEFT KNEE: ICD-10-CM

## 2024-06-14 DIAGNOSIS — G89.29 CHRONIC PAIN OF LEFT KNEE: ICD-10-CM

## 2024-06-14 PROCEDURE — 73721 MRI JNT OF LWR EXTRE W/O DYE: CPT | Mod: TC,RT

## 2024-06-20 ENCOUNTER — TELEPHONE (OUTPATIENT)
Dept: ORTHOPEDICS | Facility: CLINIC | Age: 64
End: 2024-06-20
Payer: MEDICARE

## 2024-06-20 NOTE — TELEPHONE ENCOUNTER
----- Message from CAROL ANN Tang sent at 6/18/2024  2:43 PM CDT -----  Mri with meniscus tear. Please schedule patient to see Dr. Mcfarlane.  ----- Message -----  From: Interface, Rad Results In  Sent: 6/14/2024   2:24 PM CDT  To: CAROL ANN Tang

## 2024-06-20 NOTE — TELEPHONE ENCOUNTER
Called patient and scheduled her to come in to see Dr. Mcfarlane for MRI results of right knee on 06/26/24 @ 10:20a.m.

## 2024-06-26 ENCOUNTER — OFFICE VISIT (OUTPATIENT)
Dept: ORTHOPEDICS | Facility: CLINIC | Age: 64
End: 2024-06-26
Payer: MEDICARE

## 2024-06-26 DIAGNOSIS — S83.241A ACUTE MEDIAL MENISCUS TEAR, RIGHT, INITIAL ENCOUNTER: ICD-10-CM

## 2024-06-26 DIAGNOSIS — M25.561 CHRONIC PAIN OF RIGHT KNEE: Primary | ICD-10-CM

## 2024-06-26 DIAGNOSIS — Z01.812 PRE-OPERATIVE LABORATORY EXAMINATION: ICD-10-CM

## 2024-06-26 DIAGNOSIS — G89.29 CHRONIC PAIN OF RIGHT KNEE: Primary | ICD-10-CM

## 2024-06-26 PROCEDURE — 99999 PR PBB SHADOW E&M-EST. PATIENT-LVL V: CPT | Mod: PBBFAC,,, | Performed by: ORTHOPAEDIC SURGERY

## 2024-06-26 PROCEDURE — 99215 OFFICE O/P EST HI 40 MIN: CPT | Mod: PBBFAC | Performed by: ORTHOPAEDIC SURGERY

## 2024-06-26 NOTE — PROGRESS NOTES
Patient is here for right knee pain.  She has had pain for several months she is going to therapy in his not help.  She has an MRI now that shows she has posterior horn medial meniscus tear.  She has pain on Bo's testing.  An effusion noted.  She has walking with a slight limp.  We discussed treatment options.  She understands risks and benefits surgical intervention.  We are going to set her up for a arthroscopy of the right knee.  We will set this up in the near future.  Risks and benefits of surgery were discussed.  Patient understands risks and benefits.  She wished to proceed with arthroscopy of the right knee.

## 2024-06-26 NOTE — PATIENT INSTRUCTIONS
Your surgery is scheduled at [facility] in [place] for 2024    Pre-Op Testin2024    ___x____ Lab (Clinic Lab 1st Floor)  _______ Chest X-ray ([facility] 1st Floor)  _______ EKG (Clinic 2nd Floor)    *Our office will contact you the day before surgery to give you  the arrival time.    *Do NOT eat or drink anything after midnight the night before your surgery.    *Bring all medications in their original bottles.    *Bring anything you may need for an overnight stay.     *Bathe with Hibiclens the night or morning before surgery.    *The morning of your surgery ONLY take blood pressure medications, heart medications, medications for acid reflux, and thyroid medications (the morning dose only).  *Take these medications with a sip of water.    *Do not take Insulin or Diabetic Medications the night before or the morning of your surgery, unless directed otherwise.    *Be sure that you have stopped blood thinners at the appropriate time, as instructed.  (_____ days prior to surgery)    *Bring your C-Pap machine if you have one.    *All jewelry and piercings MUST be removed prior to surgery.    *False eye lashes must be removed prior to surgery.    *Any questions regarding co-pays or deductibles with insurance, please contact the [facility] [employer] at #[phone].    *For Financial Assistance you may call #[phone] or #[phone].    Thank you for choosing Dr. Balaji Mcfarlane for your Orthopedic needs.  We look forward to caring for you. If you have any questions, please contact our office at [phone].

## 2024-06-29 ENCOUNTER — OFFICE VISIT (OUTPATIENT)
Dept: FAMILY MEDICINE | Facility: CLINIC | Age: 64
End: 2024-06-29
Payer: MEDICARE

## 2024-06-29 VITALS
SYSTOLIC BLOOD PRESSURE: 132 MMHG | HEART RATE: 72 BPM | WEIGHT: 236 LBS | HEIGHT: 66 IN | OXYGEN SATURATION: 98 % | BODY MASS INDEX: 37.93 KG/M2 | DIASTOLIC BLOOD PRESSURE: 75 MMHG | TEMPERATURE: 98 F | RESPIRATION RATE: 20 BRPM

## 2024-06-29 DIAGNOSIS — K65.1 RIGHT LOWER QUADRANT ABDOMINAL ABSCESS: Primary | ICD-10-CM

## 2024-06-29 PROCEDURE — 1160F RVW MEDS BY RX/DR IN RCRD: CPT | Mod: ,,, | Performed by: NURSE PRACTITIONER

## 2024-06-29 PROCEDURE — 4010F ACE/ARB THERAPY RXD/TAKEN: CPT | Mod: ,,, | Performed by: NURSE PRACTITIONER

## 2024-06-29 PROCEDURE — 99213 OFFICE O/P EST LOW 20 MIN: CPT | Mod: 25,,, | Performed by: NURSE PRACTITIONER

## 2024-06-29 PROCEDURE — 96372 THER/PROPH/DIAG INJ SC/IM: CPT | Mod: ,,, | Performed by: NURSE PRACTITIONER

## 2024-06-29 PROCEDURE — 3044F HG A1C LEVEL LT 7.0%: CPT | Mod: ,,, | Performed by: NURSE PRACTITIONER

## 2024-06-29 PROCEDURE — 1159F MED LIST DOCD IN RCRD: CPT | Mod: ,,, | Performed by: NURSE PRACTITIONER

## 2024-06-29 PROCEDURE — 3078F DIAST BP <80 MM HG: CPT | Mod: ,,, | Performed by: NURSE PRACTITIONER

## 2024-06-29 PROCEDURE — 3008F BODY MASS INDEX DOCD: CPT | Mod: ,,, | Performed by: NURSE PRACTITIONER

## 2024-06-29 PROCEDURE — 3075F SYST BP GE 130 - 139MM HG: CPT | Mod: ,,, | Performed by: NURSE PRACTITIONER

## 2024-06-29 RX ORDER — LINCOMYCIN HYDROCHLORIDE 300 MG/ML
600 INJECTION, SOLUTION INTRAMUSCULAR; INTRAVENOUS; SUBCONJUNCTIVAL
Status: COMPLETED | OUTPATIENT
Start: 2024-06-29 | End: 2024-06-29

## 2024-06-29 RX ORDER — CLINDAMYCIN HYDROCHLORIDE 300 MG/1
300 CAPSULE ORAL EVERY 8 HOURS
Qty: 30 CAPSULE | Refills: 0 | Status: SHIPPED | OUTPATIENT
Start: 2024-06-29 | End: 2024-07-09

## 2024-06-29 RX ADMIN — LINCOMYCIN HYDROCHLORIDE 600 MG: 300 INJECTION, SOLUTION INTRAMUSCULAR; INTRAVENOUS; SUBCONJUNCTIVAL at 10:06

## 2024-06-29 NOTE — PROGRESS NOTES
GAYLE Hendricks   [place] [facility] [place] [address] [facility] [place] [address]  [phone]      PATIENT NAME: Maureen Easley  : 1960  DATE: 24  MRN: [id number]      Billing Provider: GAYLE Hendricks  Level of Service: [place] OFFICE/OUTPT VISIT, EST, LEVL III, 20-29 MIN  Patient PCP Information       Provider PCP Type    GAYLE Lou General            Reason for Visit / Chief Complaint: Skin lesion (Skin lesion to right lower abdomen)       Update PCP  Update Chief Complaint         History of Present Illness / Problem Focused Workflow       Patient presents to clinic with the above listed complaint. Abscess has been present for 3 days. Patient has tried to drain the area without success.       Review of Systems     Review of Systems   Constitutional:  Negative for chills, diaphoresis, fatigue and fever.   HENT:  Negative for congestion, ear pain, facial swelling and trouble swallowing.    Eyes:  Negative for pain, discharge, redness, itching and visual disturbance.   Respiratory:  Negative for apnea, cough, chest tightness, shortness of breath and wheezing.    Cardiovascular:  Negative for chest pain, palpitations and leg swelling.   Gastrointestinal:  Negative for abdominal pain, constipation, diarrhea, nausea and vomiting.   Genitourinary:  Negative for dysuria.   Skin:  Positive for wound. Negative for rash.   Neurological:  Negative for dizziness and headaches.       Medical / Social / Family History     Past Medical History:   Diagnosis Date    Bipolar disorder     Depression     Hyperlipidemia     Hypertension        Past Surgical History:   Procedure Laterality Date    COLONOSCOPY  2019    hx polyps    HYSTERECTOMY  1998       Social History  Ms.  reports that she has never smoked. She has never used smokeless tobacco. She reports that she does not drink alcohol and does not use drugs.    Family  History  Ms.'s family history includes Hypertension in an other family member.    Medications and Allergies     Medications  No outpatient medications have been marked as taking for the 6/29/24 encounter (Office Visit) with Perlita Mosley FNP.       Allergies  Review of patient's allergies indicates:   Allergen Reactions    Sulfamethoxazole-trimethoprim      Other reaction(s): Unknown    Clarithromycin Rash       Physical Examination     Vitals:    06/29/24 0944   BP: 132/75   Pulse: 72   Resp: 20   Temp: 97.7 °F (36.5 °C)     Physical Exam  Vitals and nursing note reviewed.   Constitutional:       General: She is awake.      Appearance: Normal appearance.   HENT:      Head: Normocephalic.      Right Ear: Tympanic membrane, ear canal and external ear normal.      Left Ear: Tympanic membrane, ear canal and external ear normal.      Nose: Nose normal.      Mouth/Throat:      Lips: Pink.      Mouth: Mucous membranes are moist.      Pharynx: Oropharynx is clear.   Eyes:      General: Lids are normal.      Extraocular Movements: Extraocular movements intact.      Conjunctiva/sclera: Conjunctivae normal.      Pupils: Pupils are equal, round, and reactive to light.   Cardiovascular:      Rate and Rhythm: Normal rate and regular rhythm.      Pulses: Normal pulses.      Heart sounds: Normal heart sounds. No murmur heard.  Pulmonary:      Effort: Pulmonary effort is normal.      Breath sounds: Normal breath sounds. No decreased breath sounds, wheezing, rhonchi or rales.   Musculoskeletal:      Right lower leg: No edema.      Left lower leg: No edema.   Skin:     General: Skin is warm.      Coloration: Skin is not jaundiced.      Findings: Abscess present. No rash.             Comments: Right lower abdominal abscess. Erythema and inflammation noted.    Neurological:      Mental Status: She is alert. Mental status is at baseline.   Psychiatric:         Mood and Affect: Mood normal.         Behavior: Behavior normal. Behavior  is cooperative.         Assessment and Plan (including Health Maintenance)      Problem List  Smart Sets  Document Outside HM   :    Health Maintenance Due   Topic Date Due    TETANUS VACCINE  Never done    Shingles Vaccine (1 of 2) Never done    RSV Vaccine (Age 60+ and Pregnant patients) (1 - 1-dose 60+ series) Never done    COVID-19 Vaccine (5 - 2023-24 season) 09/01/2023    Colorectal Cancer Screening  08/21/2024       Problem List Items Addressed This Visit    None  Visit Diagnoses       Right lower quadrant abdominal abscess    -  Primary            Health Maintenance Topics with due status: Not Due       Topic Last Completion Date    Mammogram 11/20/2023    Hemoglobin A1c (Prediabetes) 04/03/2024    Lipid Panel 04/03/2024       Future Appointments   Date Time Provider Department Center   7/23/2024  8:40 AM Jo Taylor MD [facility]   10/16/2024 10:00 AM Emily Mcmullen FNP [facility]   10/29/2024  1:30 PM Ramandeep Canas CNM [facility] OBG[facility]   1/8/2025  9:30 AM Za Moffett MD [facility]          Signature:  GAYLE Hendricks  [place] [facility] - [facility]  [address] [place] [address] [place] [address]  [phone]    Date of encounter: 6/29/24

## 2024-06-29 NOTE — PATIENT INSTRUCTIONS
Take antibiotics as prescribed  Warm compresses twice a day. If wound opens and drains apply antibiotic ointment and cover with a bandage daily.   Leave open to air if no drainage is noted.

## 2024-07-05 ENCOUNTER — OFFICE VISIT (OUTPATIENT)
Dept: FAMILY MEDICINE | Facility: CLINIC | Age: 64
End: 2024-07-05
Payer: MEDICARE

## 2024-07-05 VITALS
TEMPERATURE: 98 F | DIASTOLIC BLOOD PRESSURE: 79 MMHG | SYSTOLIC BLOOD PRESSURE: 124 MMHG | HEART RATE: 85 BPM | WEIGHT: 234.19 LBS | HEIGHT: 66 IN | OXYGEN SATURATION: 96 % | BODY MASS INDEX: 37.64 KG/M2

## 2024-07-05 DIAGNOSIS — I10 UNCONTROLLED HYPERTENSION: ICD-10-CM

## 2024-07-05 DIAGNOSIS — Z12.11 SCREENING FOR COLON CANCER: Primary | ICD-10-CM

## 2024-07-05 RX ORDER — LISINOPRIL 20 MG/1
20 TABLET ORAL DAILY
Qty: 90 TABLET | Refills: 0 | Status: SHIPPED | OUTPATIENT
Start: 2024-07-05

## 2024-07-05 NOTE — PATIENT INSTRUCTIONS
"Controlling Your Blood Pressure Through Lifestyle   The Basics   Written by the doctors and editors at [facility]   What does my lifestyle have to do with my blood pressure? -- The things you do and the foods you eat have a big effect on your blood pressure and your overall health. Following the right lifestyle can:  Lower your blood pressure or keep you from getting high blood pressure in the first place  Reduce your need for blood pressure medicines  Make medicines for high blood pressure work better, if you do take them  Lower the chances that you'll have a heart attack or stroke, or develop kidney disease  Which lifestyle choices will help lower my blood pressure? -- Here's what you can do:  Lose weight (if you are overweight)  Choose a diet rich in fruits, vegetables, and low-fat dairy products, and low in meats, sweets, and refined grains  Eat less salt (sodium)  Do something active for at least 30 minutes a day on most days of the week  Limit the amount of alcohol you drink  If you have high blood pressure, it's also very important to quit smoking (if you smoke). Quitting smoking might not bring your blood pressure down. But it will lower the chances that you'll have a heart attack or stroke, and it will help you feel better and live longer.  Start low and go slow -- The changes listed above might sound like a lot, but don't worry. You don't have to change everything all at once. The key to improving your lifestyle is to "start low and go slow." Choose 1 small, specific thing to change and try doing it for a while. If it works for you, keep doing it until it becomes a habit. If it doesn't, don't give up. Choose something else to change and see how that goes.  Let's say, for example, that you would like to improve your diet. If you're the type of person who eats cheeseburgers and French fries all the time, you can't switch to eating just salads from one day to the next. When people try to make changes like that, " "they often fail. Then they feel frustrated and tend to give up. So instead of trying to change everything about your diet in 1 day, change 1 or 2 small things about your diet and give yourself time to get used to those changes. For instance, keep the cheeseburger but give up the French fries. Or eat the same things but cut your portions in half.  As you find things that you are able to change and stick with, keep adding new changes. In time, you will see that you can actually change a lot. You just have to get used to the changes slowly.  Lose weight -- When people think about losing weight, they sometimes make it more complicated than it really is. To lose weight, you have to either eat less or move more. If you do both of those things, it's even better. But there is no single weight-loss diet or activity that's better than any other. When it comes to weight loss, the most effective plan is the one that you'll stick with.  Improve your diet -- There is no single diet that is right for everyone. But in general, a healthy diet can include:  Lots of fruits, vegetables, and whole grains  Some beans, peas, lentils, chickpeas, and similar foods  Some nuts, such as walnuts, almonds, and peanuts  Fat-free or low-fat milk and milk products  Some fish  To have a healthy diet, it's also important to limit or avoid sugar, sweets, meats, and refined grains. (Refined grains are found in white bread, white rice, most forms of pasta, and most packaged "snack" foods.)  Reduce salt -- Many people think that eating a low-sodium diet means avoiding the salt shaker and not adding salt when cooking. The truth is, not adding salt at the table or when you cook will only help a little. Almost all of the sodium you eat is already in the food you buy at the grocery store or at restaurants (figure 1).  The most important thing you can do to cut down on sodium is to eat less processed food. That means that you should avoid most foods that are " "sold in cans, boxes, jars, and bags. You should also eat in restaurants less often.  To reduce the amount of sodium you get, buy fresh or fresh-frozen fruits, vegetables, and meats. (Fresh-frozen foods have had nothing added to them before freezing.) Then you can make meals at home, from scratch, with these ingredients.  As with the other changes, don't try to cut out salt all at once. Instead, choose 1 or 2 foods that have a lot of sodium and try to replace them with low-sodium choices. When you get used to those low-sodium options, find another food or 2 to change. Then keep going, until all the foods you eat are sodium-free or low in sodium.  Become more active -- If you want to be more active, you don't have to go to the gym or get all sweaty. It is possible to increase your activity level while doing everyday things you enjoy. Walking, gardening, and dancing are just a few of the things that you might try. As with all the other changes, the key is not to do too much too fast. If you don't do any activity now, start by walking for just a few minutes every other day. Do that for a few weeks. If you stick with it, try doing it for longer. But if you find that you don't like walking, try a different activity.  Drink less alcohol -- If you are a woman, do not have more than 1 "standard drink" of alcohol a day. If you are a man, do not have more than 2. A "standard drink" is:  A can or bottle that has 12 ounces of beer  A glass that has 5 ounces of wine  A shot that has 1.5 ounces of whiskey  Where should I start? -- If you want to improve your lifestyle, start by making the changes that you think would be easiest for you. If you used to exercise and just got out of the habit, maybe it would be easy for you to start exercising again. Or if you actually like cooking meals from scratch, maybe the first thing you should focus on is eating home-cooked meals that are low in sodium.  Whatever you tackle first, choose " "specific, realistic goals, and give yourself a deadline. For example, do not decide that you are going to "exercise more." Instead, decide that you are going to walk for 10 minutes on Monday, Wednesday, and Friday, and that you are going to do this for the next 2 weeks.  When lifestyle changes are too general, people have a hard time following through.  Now go. You can do it!  All topics are updated as new evidence becomes available and our peer review process is complete.  This topic retrieved from [employer] on: Sep 21, 2021.  Topic 49708 Version 8.0  Release: 29.4.2 - C29.263  © 2021 UpToDate, Inc. and/or its affiliates. All rights reserved.  figure 1: Sources of sodium in your diet     Graphic 35479 Version 2.0    Consumer Information Use and Disclaimer   This information is not specific medical advice and does not replace information you receive from your health care provider. This is only a brief summary of general information. It does NOT include all information about conditions, illnesses, injuries, tests, procedures, treatments, therapies, discharge instructions or life-style choices that may apply to you. You must talk with your health care provider for complete information about your health and treatment options. This information should not be used to decide whether or not to accept your health care provider's advice, instructions or recommendations. Only your health care provider has the knowledge and training to provide advice that is right for you. The use of this information is governed by the [employer] End User License Agreement, available at https://www.[employer]alGuard/en/solutions/[employer]/about/eric.The use of [employer] content is governed by the [employer] Terms of Use. ©2021 UpToDate, Inc. All rights reserved.  Copyright   © 2021 UpToDate, Inc. and/or its affiliates. All rights reserved.      DASH Diet   About this topic   DASH stands for Dietary Approaches to Stop Hypertension. The DASH diet may " help you lower blood pressure. It may also help keep you from getting high blood pressure. You will eat less fat and more fiber on the DASH diet.  This diet gives you more minerals that fight high blood pressure. Some nutrients in this diet are:  Potassium ? Acts to help you get rid of salt. This may help to lower blood pressure.  Calcium ? Makes blood vessels and muscles work the right way  Magnesium - Helps blood vessels relax  Fiber ? Helps you feel full. It also helps digestion.  What will the results be?   The DASH diet may help you:  Lower your blood pressure and cholesterol  Lower your risk for cancer, heart disease, heart attack, and stroke. It may also lower your risk for heart failure, kidney stones, and diabetes.  Lose weight or keep a healthy weight  What lifestyle changes are needed?   Add regular exercise to get the most help from this diet.  Try to lower stress. Find ways to relax.  Stop smoking. Avoid secondhand smoke.  Limit alcohol intake.  What changes to diet are needed?   Know about poor eating habits. Then, you can fix them as you work with the program.  This diet encourages fruits and vegetables, whole grains, lean meats, healthy fats, and low-fat or fat-free dairy products.  This diet is lower in saturated fats, trans-fats, cholesterol, added sugars, and sodium.  Who should use this diet?   This eating plan is good for the whole family. It is also good for people with high blood pressure and those at risk for high blood pressure.  What foods are good to eat?   Grains: Try to eat 6 to 8 servings of whole grain, high fiber foods each day. These are bread, cereals, brown rice, or pasta.  Fruits and vegetables: Eat 4 to 5 servings each day. Try to pick many kinds and colors. Fresh or frozen are best. Look for low sodium or salt-free if you choose canned.  Dairy: Try to eat 2 to 3 servings of fat free and low fat milk products each day.  Lean meats, poultry, and seafood: Try to eat 6 servings or  less of lean meats, poultry, and seafood each day. Try to choose more low fat or lean meats like chicken and turkey. Eat less red meat. Eat more fish instead.  Nuts, seeds, and legumes (dry beans and peas): Try to eat 4 to 5 servings each week. Try to pick nuts such as almonds and walnuts, sunflower seeds, peanut butter, soy beans, lentils, kidney beans, and split peas.  Fats and oils: Try to eat 2 to 3 servings of fats and oils each day. Eat good fats found in fish, nuts, and avocados. Try using olive oil or vegetable oils such as canola oil. Other good oils to try are corn, safflower, sunflower, or soybean oils. Use low-sodium and low-fat salad dressing and mayonnaise.  Condiments: Pepper, herbs, spices, vinegar, lemon or lime juices are great for seasoning. Be careful to choose low-sodium or salt-free products if you use broths, soups, or soy sauce.  Sweets: Try to eat less than 5 servings each week. Choose low-fat and trans fat-free desserts. These are things like fruit flavored gelatin, sorbet, jelly beans, chris crackers, animal crackers, low-fat fig bars, and mala snaps. Eat fruit to satisfy your desire for sweets.     What foods should be limited or avoided?   Grains: Salted breads, rolls, crackers, quick breads, self-rising flours, biscuit mixes, regular bread crumbs, instant hot cereals, commercially-prepared rice, pasta, stuffing mixes  Fruits and vegetables: Commercially-prepared potatoes and vegetable mixes, regular canned vegetables and juices, vegetables frozen with sauce or pickled vegetables, processed fruits with salt or sodium  Milk: Whole milk, malted milk, chocolate milk, buttermilk, cheese, ice cream  Meats and beans: Smoked, cured, salted, or canned fish; meats or poultry such as hernandez, sausages, sardines; high-fat cuts of meat like beef, lamb, or pork; chicken with the skin on it  Fats: Cut back on solid fats like butter, lard, and margarine. Eat less food with high saturated fat,  cholesterol and total fat.  Condiments and snacks: Salted and canned peas, beans, and olives; salted snack foods; fried foods; soda or other sweetened drinks  Sweets: High-fat baked goods such as muffins, donuts, pastries, commercial baked goods, candy bars  If you choose to drink alcohol, limit the amount you drink. Women should have 1 drink or less per day and men should have 2 drinks or less per day.  Helpful tips   Avoid eating canned vegetables and processed foods. These have a lot of salt in them. Look for a low-salt or low-sodium choice.  Try baking or broiling instead of frying food.  Write down the foods you eat. This will help you track what you have eaten each week.  When you go to a grocery store, have a list or a meal plan. Do not shop when you are hungry to avoid cravings for foods.  Read food labels with care. They will show you how much is in a serving. The amount is given as a percentage of the total amount you need each day. Reading labels will help you make healthy food choices.       Avoid fast foods.  Talk to your doctor or dietitian to see if you need vitamin and mineral supplements to help you balance your diet.  Talk to a dietitian for help.  Where can I learn more?   Academy of Nutrition and Dietetics  https://www.eatright.org/health/wellness/heart-and-cardiovascular-health/dash-diet-reducing-hypertension-through-diet-and-lifestyle   FamilyDoctor.org  http://familydoctor.org/familydoctor/en/prevention-wellness/food-nutrition/weight-loss/the-dash-diet-healthy-eating-to-control-your-blood-pressure.html   Last Reviewed Date   2021-03-15  Consumer Information Use and Disclaimer   This information is not specific medical advice and does not replace information you receive from your health care provider. This is only a brief summary of general information. It does NOT include all information about conditions, illnesses, injuries, tests, procedures, treatments, therapies, discharge instructions or  life-style choices that may apply to you. You must talk with your health care provider for complete information about your health and treatment options. This information should not be used to decide whether or not to accept your health care providers advice, instructions or recommendations. Only your health care provider has the knowledge and training to provide advice that is right for you.  Copyright   Copyright © 2021 UpToDate, Inc. and its affiliates and/or licensors. All rights reserved.    Why Water Is Important to Health   About this topic   Your body needs a certain amount of water to work the right way. Your body takes in water from the fluids you drink and the food you eat. This helps your body get rid of waste products. Water also helps keep your temperature normal, helps with digestion, protects your spinal cord, and lubricates your joints.  If you dont have enough water, doctors say you are dehydrated. You must take in enough water each day to replace what your body loses when you:  Sweat  Pass urine  Have a bowel movement  Breathe  You may lose even more water than normal if you are sick or hurt with something like:  A fever  A burn  An illness where you throw up or have loose stools  Some medicines can also make you lose more water than normal.  General   Most people have heard they need 6 to 8 glasses of water each day. This suggestion is not backed by science. Different people need to drink different amounts of water. How much you need to drink is based on things like your age, body, health, weather, and how active you are. It is even possible to drink too much water in some cases. Being thirsty is your bodys way of telling you it needs fluids.  Good ways to make sure you take in enough fluids include:  Drink healthy fluids when you are thirsty. These are things like water, low-fat milk, plain or flavored seltzer, or unsweetened tea or coffee.  Eat foods that have a higher water content. Fruits  and vegetables like strawberries, watermelon, tomatoes, and celery all have high water content.  Drink water before, during, and after a workout. Only drink sports drinks if you plan to exercise at an intense rate for more than an hour. Sports drinks have carbohydrates and electrolytes that can help with recovery.  Drink water with meals.  There are some kinds of drinks that are not healthy and should be limited or avoided. Try NOT to drink:  Sugary drinks like soda, sports drinks, or sweetened tea or coffee. These can add calories to your diet and lead to tooth decay.  Energy drinks. These can have a lot of caffeine and sugar in them.  Juice drinks. These often have limited amounts of juice and a lot of sugar in them.  Too much alcohol or caffeine. Both of these can cause dehydration.  Tips to increase your fluid intake:  Keep a bottle of water with you. This can encourage you to drink more.  Add fruits or vegetables to plain water to change the taste. Add berries, jovan, or cucumbers to your water to flavor it.  When you are hungry, you might actually be thirsty. True hunger will not go away by drinking water. Water can help you manage your weight.  If you have trouble remembering to drink water, try drinking water on a schedule. Set specific times for when you will drink water.  Choose water when eating at restaurants.  Where can I learn more?   American Association of Family Physicians  https://familydoctor.org/hydration-why-its-so-important/   Better Health Channel  https://www.betterhealth.jerrell.gov.au/health/HealthyLiving/water-a-vital-nutrient   Centers for Disease Control and Prevention  https://www.cdc.gov/healthywater/drinking/nutrition/index.html   Kids Health  https://kidshealth.org/en/kids/water.html   Last Reviewed Date   2021-09-09  Consumer Information Use and Disclaimer   This information is not specific medical advice and does not replace information you receive from your health care provider. This  is only a brief summary of general information. It does NOT include all information about conditions, illnesses, injuries, tests, procedures, treatments, therapies, discharge instructions or life-style choices that may apply to you. You must talk with your health care provider for complete information about your health and treatment options. This information should not be used to decide whether or not to accept your health care providers advice, instructions or recommendations. Only your health care provider has the knowledge and training to provide advice that is right for you.  Copyright   Copyright © 2021 UpToDate, Inc. and its affiliates and/or licensors. All rights reserved.

## 2024-07-05 NOTE — PROGRESS NOTES
Subjective     Patient ID: Maureen Easley is a 63 y.o. female.    Chief Complaint: Medication Refill (Exam 4)    Patient presents to clinic for med refill on lisinopril. Tolerating med well.     Medication Refill  Pertinent negatives include no abdominal pain, chest pain, fatigue, headaches, numbness, vertigo or weakness.     Review of Systems   Constitutional:  Negative for activity change, fatigue and unexpected weight change.   Eyes:  Negative for visual disturbance.   Respiratory:  Negative for chest tightness and shortness of breath.    Cardiovascular:  Negative for chest pain, palpitations and leg swelling.   Gastrointestinal:  Negative for abdominal pain and blood in stool.   Genitourinary:  Negative for decreased urine volume, difficulty urinating, dysuria, flank pain, frequency and hematuria.   Neurological:  Negative for dizziness, vertigo, tremors, seizures, syncope, facial asymmetry, speech difficulty, weakness, light-headedness, numbness, headaches and memory loss.   Hematological:  Negative for adenopathy. Does not bruise/bleed easily.   Psychiatric/Behavioral:  The patient is not nervous/anxious.           Objective     Physical Exam  Vitals and nursing note reviewed.   Constitutional:       Appearance: Normal appearance. She is obese.   Cardiovascular:      Rate and Rhythm: Normal rate and regular rhythm.      Pulses: Normal pulses.      Heart sounds: Normal heart sounds.   Pulmonary:      Effort: Pulmonary effort is normal.      Breath sounds: Normal breath sounds.   Abdominal:      General: Abdomen is flat. Bowel sounds are normal.      Palpations: Abdomen is soft.   Musculoskeletal:         General: Normal range of motion.      Cervical back: Normal range of motion and neck supple.   Skin:     General: Skin is warm and dry.      Capillary Refill: Capillary refill takes less than 2 seconds.   Neurological:      General: No focal deficit present.      Mental Status: She is alert and oriented to  person, place, and time.   Psychiatric:         Mood and Affect: Mood normal.         Behavior: Behavior normal.         Thought Content: Thought content normal.         Judgment: Judgment normal.            Assessment and Plan     1. Screening for colon cancer  -     Colonoscopy; Future; Expected date: 07/05/2024    2. Uncontrolled hypertension  -     lisinopriL (PRINIVIL,ZESTRIL) 20 MG tablet; Take 1 tablet (20 mg total) by mouth once daily.  Dispense: 90 tablet; Refill: 0        Care gaps addressed at today's visit   Lifestyle changes discussed for htn management.          Follow up in about 3 months (around 10/5/2024).    I spent a total of 20 minutes on the day of the visit.This includes face to face time and non-face to face time preparing to see the patient (eg, review of tests), obtaining and/or reviewing separately obtained history, documenting clinical information in the electronic or other health record, independently interpreting results and communicating results to the patient/family/caregiver, or care coordinator.    RAVINDER Hilliard

## 2024-07-10 NOTE — H&P (VIEW-ONLY)
Department of Orthopedic Surgery    History and Physical       Principal Problem: Chronic pain of right knee [M25.561, G89.29]           HISTORY:  63-year-old female with a right knee medial meniscus tear needing arthroscopic evaluation debridement possible repair of the right knee    PAST MEDICAL HISTORY:   Past Medical History:   Diagnosis Date    Bipolar disorder     Depression     Hyperlipidemia     Hypertension         PAST SURGICAL HISTORY:   Past Surgical History:   Procedure Laterality Date    COLONOSCOPY  08/21/2019    hx polyps    HYSTERECTOMY  1998          ALLERGIES:   Review of patient's allergies indicates:   Allergen Reactions    Sulfamethoxazole-trimethoprim      Other reaction(s): Unknown    Clarithromycin Rash          MEDICATIONS: (Not in a hospital admission)       SOCIAL HISTORY:   Social History     Socioeconomic History    Marital status:    Tobacco Use    Smoking status: Never    Smokeless tobacco: Never   Substance and Sexual Activity    Alcohol use: Never    Drug use: Never    Sexual activity: Yes     Partners: Male     Birth control/protection: None     Social Determinants of Health     Financial Resource Strain: Low Risk  (4/17/2024)    Overall Financial Resource Strain (CARDIA)     Difficulty of Paying Living Expenses: Not hard at all   Food Insecurity: No Food Insecurity (4/17/2024)    Hunger Vital Sign     Worried About Running Out of Food in the Last Year: Never true     Ran Out of Food in the Last Year: Never true   Transportation Needs: No Transportation Needs (4/17/2024)    PRAPARE - Transportation     Lack of Transportation (Medical): No     Lack of Transportation (Non-Medical): No   Physical Activity: Insufficiently Active (4/17/2024)    Exercise Vital Sign     Days of Exercise per Week: 1 day     Minutes of Exercise per Session: 10 min   Stress: Stress Concern Present (4/17/2024)    [employer] - Occupational Stress Questionnaire      Feeling of Stress : Very much   Housing Stability: Unknown (4/17/2024)    Housing Stability Vital Sign     Unable to Pay for Housing in the Last Year: No          FAMILY HISTORY:   Family History   Problem Relation Name Age of Onset    Hypertension Other      Breast cancer Neg Hx      Colon cancer Neg Hx      Ovarian cancer Neg Hx            PHYSICAL EXAM:   There were no vitals filed for this visit.  There is no height or weight on file to calculate BMI.     In general, this is a well-developed, well-nourished female . The patient is alert, oriented and cooperative.      HEENT:  Normocephalic, atraumatic.  Extraocular movements are intact bilaterally.  The oropharynx is benign.       NECK:  Nontender with good range of motion.      LUNGS:  Clear to auscultation bilaterally.      HEART:  Demonstrates a regular rate and rhythm.  No murmurs appreciated.      ABDOMEN:  Soft, non-tender, non-distended.        EXTREMITIES:  Right lower extremity she moves her toes has sensation to touch has palpable pulses.  She is tender palpation of the posteromedial joint line.  Pain on Bo's testing.  No instability in the knee is noted.      RADIOGRAPHIC FINDINGS:  X-rays right knee show no fracture subluxation.  MRI right knee shows medial meniscus tear.      IMPRESSION:  Medial meniscus tear right knee      PLAN:  Arthroscopic evaluation debridement possible repair right knee    I had a long discussion with the patient about treatment options, including operative and nonoperative treatments. We discussed pros and cons of each including risks pertinent to surgery including pain, infection, bleeding, damage to adjacent structures like nerves and blood vessels, failure to heal, need for future surgeries, stiffness, instability, loss of limb, anesthesia risks like stroke, blood clot, loss of life. We discussed the possibility of need for later hardware removal in the case that hardware was used. We discussed common and uncommon  risks, and discussed patient specific factors that may increase the risks present with surgery. All questions were answered. The patient expressed understanding of the pros and cons of surgery and wanted to proceed with surgical treatment.        (Subject to voice recognition error, transcription service not allowed)

## 2024-07-11 ENCOUNTER — HOSPITAL ENCOUNTER (OUTPATIENT)
Facility: HOSPITAL | Age: 64
Discharge: HOME | End: 2024-07-11
Attending: ORTHOPAEDIC SURGERY | Admitting: ORTHOPAEDIC SURGERY
Payer: MEDICARE

## 2024-07-11 ENCOUNTER — ANESTHESIA (OUTPATIENT)
Dept: SURGERY | Facility: HOSPITAL | Age: 64
End: 2024-07-11
Payer: MEDICARE

## 2024-07-11 ENCOUNTER — ANESTHESIA EVENT (OUTPATIENT)
Dept: SURGERY | Facility: HOSPITAL | Age: 64
End: 2024-07-11
Payer: MEDICARE

## 2024-07-11 VITALS
TEMPERATURE: 98 F | HEIGHT: 66 IN | SYSTOLIC BLOOD PRESSURE: 136 MMHG | BODY MASS INDEX: 37.93 KG/M2 | WEIGHT: 236 LBS | RESPIRATION RATE: 1 BRPM | HEART RATE: 101 BPM | OXYGEN SATURATION: 92 % | DIASTOLIC BLOOD PRESSURE: 60 MMHG

## 2024-07-11 DIAGNOSIS — S83.241A ACUTE MEDIAL MENISCUS TEAR, RIGHT, INITIAL ENCOUNTER: ICD-10-CM

## 2024-07-11 PROCEDURE — 63600175 PHARM REV CODE 636 W HCPCS: Performed by: NURSE ANESTHETIST, CERTIFIED REGISTERED

## 2024-07-11 PROCEDURE — 37000009 HC ANESTHESIA EA ADD 15 MINS: Performed by: ORTHOPAEDIC SURGERY

## 2024-07-11 PROCEDURE — 29880 ARTHRS KNE SRG MNISECTMY M&L: CPT | Mod: RT,,, | Performed by: ORTHOPAEDIC SURGERY

## 2024-07-11 PROCEDURE — 63600175 PHARM REV CODE 636 W HCPCS: Mod: JZ,JG | Performed by: ORTHOPAEDIC SURGERY

## 2024-07-11 PROCEDURE — 25000003 PHARM REV CODE 250: Performed by: ORTHOPAEDIC SURGERY

## 2024-07-11 PROCEDURE — 37000008 HC ANESTHESIA 1ST 15 MINUTES: Performed by: ORTHOPAEDIC SURGERY

## 2024-07-11 PROCEDURE — 25000003 PHARM REV CODE 250: Performed by: ANESTHESIOLOGY

## 2024-07-11 PROCEDURE — 97161 PT EVAL LOW COMPLEX 20 MIN: CPT

## 2024-07-11 PROCEDURE — 71000015 HC POSTOP RECOV 1ST HR: Performed by: ORTHOPAEDIC SURGERY

## 2024-07-11 PROCEDURE — 36000711: Performed by: ORTHOPAEDIC SURGERY

## 2024-07-11 PROCEDURE — 71000016 HC POSTOP RECOV ADDL HR: Performed by: ORTHOPAEDIC SURGERY

## 2024-07-11 PROCEDURE — 27201423 OPTIME MED/SURG SUP & DEVICES STERILE SUPPLY: Performed by: ORTHOPAEDIC SURGERY

## 2024-07-11 PROCEDURE — 25000003 PHARM REV CODE 250: Performed by: NURSE ANESTHETIST, CERTIFIED REGISTERED

## 2024-07-11 PROCEDURE — 71000033 HC RECOVERY, INTIAL HOUR: Performed by: ORTHOPAEDIC SURGERY

## 2024-07-11 PROCEDURE — 36000710: Performed by: ORTHOPAEDIC SURGERY

## 2024-07-11 RX ORDER — DIPHENHYDRAMINE HYDROCHLORIDE 50 MG/ML
INJECTION INTRAMUSCULAR; INTRAVENOUS
Status: DISCONTINUED | OUTPATIENT
Start: 2024-07-11 | End: 2024-07-11

## 2024-07-11 RX ORDER — EPHEDRINE SULFATE 50 MG/ML
INJECTION, SOLUTION INTRAVENOUS
Status: DISCONTINUED | OUTPATIENT
Start: 2024-07-11 | End: 2024-07-11

## 2024-07-11 RX ORDER — EPINEPHRINE 1 MG/ML
INJECTION INTRAMUSCULAR; INTRAVENOUS; SUBCUTANEOUS
Status: DISCONTINUED | OUTPATIENT
Start: 2024-07-11 | End: 2024-07-11 | Stop reason: HOSPADM

## 2024-07-11 RX ORDER — PROPOFOL 10 MG/ML
VIAL (ML) INTRAVENOUS
Status: DISCONTINUED | OUTPATIENT
Start: 2024-07-11 | End: 2024-07-11

## 2024-07-11 RX ORDER — MEPERIDINE HYDROCHLORIDE 25 MG/ML
25 INJECTION INTRAMUSCULAR; INTRAVENOUS; SUBCUTANEOUS EVERY 10 MIN PRN
Status: DISCONTINUED | OUTPATIENT
Start: 2024-07-11 | End: 2024-07-11 | Stop reason: HOSPADM

## 2024-07-11 RX ORDER — CEFAZOLIN SODIUM 1 G/3ML
INJECTION, POWDER, FOR SOLUTION INTRAMUSCULAR; INTRAVENOUS
Status: DISCONTINUED | OUTPATIENT
Start: 2024-07-11 | End: 2024-07-11

## 2024-07-11 RX ORDER — HYDROMORPHONE HYDROCHLORIDE 2 MG/ML
0.5 INJECTION, SOLUTION INTRAMUSCULAR; INTRAVENOUS; SUBCUTANEOUS EVERY 5 MIN PRN
Status: DISCONTINUED | OUTPATIENT
Start: 2024-07-11 | End: 2024-07-11 | Stop reason: HOSPADM

## 2024-07-11 RX ORDER — OXYCODONE HYDROCHLORIDE 5 MG/1
5 TABLET ORAL
Status: DISCONTINUED | OUTPATIENT
Start: 2024-07-11 | End: 2024-07-11 | Stop reason: HOSPADM

## 2024-07-11 RX ORDER — ACETAMINOPHEN 500 MG
1000 TABLET ORAL EVERY 6 HOURS PRN
Status: DISCONTINUED | OUTPATIENT
Start: 2024-07-11 | End: 2024-07-11 | Stop reason: HOSPADM

## 2024-07-11 RX ORDER — DIAZEPAM 5 MG/1
10 TABLET ORAL
Status: COMPLETED | OUTPATIENT
Start: 2024-07-11 | End: 2024-07-11

## 2024-07-11 RX ORDER — MIDAZOLAM HYDROCHLORIDE 5 MG/ML
INJECTION INTRAMUSCULAR; INTRAVENOUS
Status: DISCONTINUED | OUTPATIENT
Start: 2024-07-11 | End: 2024-07-11

## 2024-07-11 RX ORDER — BUPIVACAINE HYDROCHLORIDE 2.5 MG/ML
INJECTION, SOLUTION EPIDURAL; INFILTRATION; INTRACAUDAL
Status: DISCONTINUED | OUTPATIENT
Start: 2024-07-11 | End: 2024-07-11 | Stop reason: HOSPADM

## 2024-07-11 RX ORDER — FENTANYL CITRATE 50 UG/ML
INJECTION, SOLUTION INTRAMUSCULAR; INTRAVENOUS
Status: DISCONTINUED | OUTPATIENT
Start: 2024-07-11 | End: 2024-07-11

## 2024-07-11 RX ORDER — ONDANSETRON 4 MG/1
8 TABLET, ORALLY DISINTEGRATING ORAL EVERY 8 HOURS PRN
Status: DISCONTINUED | OUTPATIENT
Start: 2024-07-11 | End: 2024-07-11 | Stop reason: HOSPADM

## 2024-07-11 RX ORDER — MORPHINE SULFATE 10 MG/ML
4 INJECTION INTRAMUSCULAR; INTRAVENOUS; SUBCUTANEOUS EVERY 5 MIN PRN
Status: DISCONTINUED | OUTPATIENT
Start: 2024-07-11 | End: 2024-07-11 | Stop reason: HOSPADM

## 2024-07-11 RX ORDER — ONDANSETRON HYDROCHLORIDE 2 MG/ML
INJECTION, SOLUTION INTRAVENOUS
Status: DISCONTINUED | OUTPATIENT
Start: 2024-07-11 | End: 2024-07-11

## 2024-07-11 RX ORDER — SODIUM CHLORIDE 9 MG/ML
INJECTION, SOLUTION INTRAVENOUS CONTINUOUS
Status: DISCONTINUED | OUTPATIENT
Start: 2024-07-11 | End: 2024-07-11 | Stop reason: HOSPADM

## 2024-07-11 RX ORDER — GLUCAGON 1 MG
1 KIT INJECTION
Status: DISCONTINUED | OUTPATIENT
Start: 2024-07-11 | End: 2024-07-11 | Stop reason: HOSPADM

## 2024-07-11 RX ORDER — LIDOCAINE HYDROCHLORIDE 20 MG/ML
INJECTION, SOLUTION EPIDURAL; INFILTRATION; INTRACAUDAL; PERINEURAL
Status: DISCONTINUED | OUTPATIENT
Start: 2024-07-11 | End: 2024-07-11

## 2024-07-11 RX ORDER — HYDROCODONE BITARTRATE AND ACETAMINOPHEN 5; 325 MG/1; MG/1
1 TABLET ORAL EVERY 4 HOURS PRN
Status: DISCONTINUED | OUTPATIENT
Start: 2024-07-11 | End: 2024-07-11 | Stop reason: HOSPADM

## 2024-07-11 RX ORDER — HYDROCODONE BITARTRATE AND ACETAMINOPHEN 10; 325 MG/1; MG/1
1 TABLET ORAL EVERY 4 HOURS PRN
Status: DISCONTINUED | OUTPATIENT
Start: 2024-07-11 | End: 2024-07-11 | Stop reason: HOSPADM

## 2024-07-11 RX ORDER — ONDANSETRON HYDROCHLORIDE 2 MG/ML
4 INJECTION, SOLUTION INTRAVENOUS DAILY PRN
Status: DISCONTINUED | OUTPATIENT
Start: 2024-07-11 | End: 2024-07-11 | Stop reason: HOSPADM

## 2024-07-11 RX ORDER — HYDROCODONE BITARTRATE AND ACETAMINOPHEN 10; 325 MG/1; MG/1
1 TABLET ORAL EVERY 6 HOURS PRN
Qty: 28 TABLET | Refills: 0 | Status: SHIPPED | OUTPATIENT
Start: 2024-07-11

## 2024-07-11 RX ORDER — PHENYLEPHRINE HYDROCHLORIDE 10 MG/ML
INJECTION INTRAVENOUS
Status: DISCONTINUED | OUTPATIENT
Start: 2024-07-11 | End: 2024-07-11

## 2024-07-11 RX ORDER — SODIUM CHLORIDE, SODIUM LACTATE, POTASSIUM CHLORIDE, CALCIUM CHLORIDE 600; 310; 30; 20 MG/100ML; MG/100ML; MG/100ML; MG/100ML
INJECTION, SOLUTION INTRAVENOUS CONTINUOUS
OUTPATIENT
Start: 2024-07-11

## 2024-07-11 RX ORDER — LIDOCAINE HYDROCHLORIDE 10 MG/ML
1 INJECTION, SOLUTION EPIDURAL; INFILTRATION; INTRACAUDAL; PERINEURAL ONCE
OUTPATIENT
Start: 2024-07-11 | End: 2024-07-11

## 2024-07-11 RX ORDER — PROMETHAZINE HYDROCHLORIDE 25 MG/1
25 TABLET ORAL EVERY 6 HOURS PRN
Status: DISCONTINUED | OUTPATIENT
Start: 2024-07-11 | End: 2024-07-11 | Stop reason: HOSPADM

## 2024-07-11 RX ORDER — DIPHENHYDRAMINE HYDROCHLORIDE 50 MG/ML
25 INJECTION INTRAMUSCULAR; INTRAVENOUS EVERY 6 HOURS PRN
Status: DISCONTINUED | OUTPATIENT
Start: 2024-07-11 | End: 2024-07-11 | Stop reason: HOSPADM

## 2024-07-11 RX ORDER — SODIUM CHLORIDE, SODIUM LACTATE, POTASSIUM CHLORIDE, CALCIUM CHLORIDE 600; 310; 30; 20 MG/100ML; MG/100ML; MG/100ML; MG/100ML
125 INJECTION, SOLUTION INTRAVENOUS CONTINUOUS
Status: DISCONTINUED | OUTPATIENT
Start: 2024-07-11 | End: 2024-07-11 | Stop reason: HOSPADM

## 2024-07-11 RX ADMIN — FENTANYL CITRATE 50 MCG: 50 INJECTION INTRAMUSCULAR; INTRAVENOUS at 01:07

## 2024-07-11 RX ADMIN — PHENYLEPHRINE HYDROCHLORIDE 100 MCG: 10 INJECTION INTRAVENOUS at 01:07

## 2024-07-11 RX ADMIN — ONDANSETRON 4 MG: 2 INJECTION INTRAMUSCULAR; INTRAVENOUS at 01:07

## 2024-07-11 RX ADMIN — EPHEDRINE SULFATE 20 MG: 50 INJECTION INTRAVENOUS at 01:07

## 2024-07-11 RX ADMIN — PROPOFOL 200 MG: 10 INJECTION, EMULSION INTRAVENOUS at 01:07

## 2024-07-11 RX ADMIN — SODIUM CHLORIDE: 9 INJECTION, SOLUTION INTRAVENOUS at 10:07

## 2024-07-11 RX ADMIN — MIDAZOLAM HYDROCHLORIDE 2 MG: 5 INJECTION, SOLUTION INTRAMUSCULAR; INTRAVENOUS at 01:07

## 2024-07-11 RX ADMIN — DIAZEPAM 10 MG: 5 TABLET ORAL at 10:07

## 2024-07-11 RX ADMIN — CEFAZOLIN 2 G: 1 INJECTION, POWDER, FOR SOLUTION INTRAMUSCULAR; INTRAVENOUS; PARENTERAL at 01:07

## 2024-07-11 RX ADMIN — EPHEDRINE SULFATE 10 MG: 50 INJECTION INTRAVENOUS at 01:07

## 2024-07-11 RX ADMIN — LIDOCAINE HYDROCHLORIDE 100 MG: 20 INJECTION, SOLUTION INTRAVENOUS at 01:07

## 2024-07-11 RX ADMIN — DIPHENHYDRAMINE HYDROCHLORIDE 12.5 MG: 50 INJECTION, SOLUTION INTRAMUSCULAR; INTRAVENOUS at 01:07

## 2024-07-11 NOTE — OP NOTE
[facility]  General Surgery  Operative Note    SUMMARY     Date of Procedure: 7/11/2024     Procedure: Procedure(s) (LRB):  ARTHROSCOPY, KNEE, WITH PARTIAL MEDIAL AND LATERAL MENISCECTOMY (Right)       Surgeons and Role:     * Balaji Mcfarlane MD - Primary    Assisting Surgeon: None    Pre-Operative Diagnosis: Acute medial meniscus tear, right, initial encounter [S83.241A]    Post-Operative Diagnosis: Post-Op Diagnosis Codes:     * Acute medial meniscus tear, right, initial encounter [S83.241A]    Anesthesia: General    Operative Findings (including complications, if any):  After risks and benefits of procedure explained at length the patient stating that she understands risks benefits written informed consent was obtained.  Patient was taken operating room placed in supine position on operative table.  Anesthesia induced per Anesthesia.  This time tourniquet was placed over cast padding on proximal right thigh this was placed into an arthroscopic leg rocha.  Pillow placed on left knee in the foot of the table was let down.  At this time right lower extremity was prepped and draped sterile fashion inferior medial and inferior lateral portals were marked on the skin at the inferior pole of the patella mediolateral borders of the patellar tendon.  At this time inferior lateral portal made with a stab incision using a 11. Blade.  Blunt trocar used to introduce the cannula into the knee.  Camera introduced knee inflated with saline.  This time the patellas track is normal position on the femur.  There was some chondromalacia changes grade 2 of the patellofemoral joint.  Medial gutter no pathology.  Medial joint line there was some postmeniscectomy changes of the medial meniscus with a tear extending from the body toward the posterior horn of the medial meniscus.  There was some grade 2 small area of grade 3 chondromalacia of the far medial aspect of the medial joint line.  ACL PCL  popliteus tendons were intact.  Lateral meniscus had a tear of the anterior horn of the lateral meniscus needing debridement.  Medial portal was made probe was placed in medial and lateral meniscus tears were confirmed grade 2 chondromalacia of the patellofemoral joint small area grade 3 chondromalacia of the medial joint line near the far medial aspect were noted ACL PCL popliteus tendons were intact.  The medial and lateral meniscus tears were confirmed.  This time biter placed in medial compartment medial meniscus tear debrided shaver used to remove any debrided material smooth edge.  Probe placed in no further tears medially.  Biter placed in lateral compartment lateral meniscus tear debrided shaver used to remove any debrided material smooth edge.  Probe placed in the further tears laterally.  Knee was then inspected no further pathology.  Instruments fluid removed and the portals closed with a 4-0 nylon interrupted suture.  Sterile occlusive dressing placed.  Patient was awakened taken recovery room in good condition.  All counts correct.  No complications.    escription of Technical Procedures:     Significant Surgical Tasks Conducted by the Assistant(s), if Applicable:     Estimated Blood Loss (EBL): * No values recorded between 7/11/2024  1:55 PM and 7/11/2024  2:23 PM *10cc           Implants: * No implants in log *    Specimens:   Specimen (24h ago, onward)      None                    Condition: Good    Disposition: PACU - hemodynamically stable.    Attestation: I was present and scrubbed for the entire procedure.

## 2024-07-11 NOTE — ANESTHESIA PROCEDURE NOTES
Intubation    Date/Time: 7/11/2024 1:20 PM    Performed by: Luana Ellis CRNA  Authorized by: Roger Hernandez MD    Intubation:     Induction:  Intravenous    Intubated:  Postinduction    Mask Ventilation:  Not attempted    Attempted By:  CRNA    Difficult Airway Encountered?: No      Complications:  None    Airway Device:  Supraglottic airway/LMA    Airway Device Size:  4.0    Placement Verified By:  Capnometry    Complicating Factors:  None    Findings Post-Intubation:  BS equal bilateral

## 2024-07-11 NOTE — PT/OT/SLP EVAL
Physical Therapy Evaluation    Patient Name:  Maureen Easley   MRN:  [id number]    Recommendations:     Discharge Recommendations: No Therapy Indicated   Discharge Equipment Recommendations: walker, rolling   Barriers to discharge: None    Assessment:     Maureen Easley is a 63 y.o. female admitted with a medical diagnosis of Acute medial meniscus tear, right, initial encounter.  She presents with the following impairments/functional limitations: decreased ROM, pain, weakness Patient with good understanding of hep and use of walker wbat. Plan is home today. Social work to issue walker for home.    Rehab Prognosis: Good; patient would benefit from acute skilled PT services to address these deficits and reach maximum level of function.    Recent Surgery: Procedure(s) (LRB):  ARTHROSCOPY, KNEE, WITH PARTIAL MEDIAL AND LATERAL MENISCECTOMY (Right) Day of Surgery    Plan:     During this hospitalization, patient to be seen 1 x/week to address the identified rehab impairments via gait training, therapeutic activities, therapeutic exercises and progress toward the following goals:    Plan of Care Expires:  07/11/24    Subjective     Chief Complaint: post op pain  Patient/Family Comments/goals: patient request walker for home  Pain/Comfort:  Pain Rating 1: 7/10  Location - Side 1: Right  Location 1: knee  Pain Addressed 1: Cessation of Activity, Pre-medicate for activity    Patients cultural, spiritual, [employer] conflicts given the current situation: no    Living Environment:  Lives at home with , steps entering home  Prior to admission, patients level of function was independent.  Equipment used at home: none.  DME owned (not currently used): none.  Upon discharge, patient will have assistance from spouse.    Objective:     Communicated with nurse prior to session.  Patient found HOB elevated with peripheral IV, blood pressure cuff, pulse ox (continuous)  upon PT entry to room.    General Precautions: Standard,  fall  Orthopedic Precautions:RLE weight bearing as tolerated   Braces:    Respiratory Status: Room air    Exams:  na    Functional Mobility:  Bed Mobility:     Supine to Sit: contact guard assistance  Transfers:     Sit to Stand:  contact guard assistance with rolling walker  Gait: ambulated 90 feet with rolling walker step to gait, wbat      AM-PAC 6 CLICK MOBILITY  Total Score:24       Treatment & Education:  Ascend/descend 3 steps wbat  Wbat with walker  Hep ascope protocol    Patient left HOB elevated with  spouse present.    GOALS:   Multidisciplinary Problems       Physical Therapy Goals       Not on file                    History:     Past Medical History:   Diagnosis Date    Bipolar disorder     Depression     Hyperlipidemia     Hypertension        Past Surgical History:   Procedure Laterality Date    COLONOSCOPY  08/21/2019    hx polyps    HYSTERECTOMY  1998       Time Tracking:     PT Received On: 07/11/24  PT Start Time: 1520     PT Stop Time: 1545  PT Total Time (min): 25 min     Billable Minutes: Evaluation 20 07/11/2024

## 2024-07-11 NOTE — TRANSFER OF CARE
"Anesthesia Transfer of Care Note    Patient: Maureen Easley    Procedure(s) Performed: Procedure(s) (LRB):  ARTHROSCOPY, KNEE, WITH PARTIAL MEDIAL AND LATERAL MENISCECTOMY (Right)    Patient location: PACU    Anesthesia Type: general    Transport from OR: Transported from OR on 100% O2 by closed face mask with adequate spontaneous ventilation    Post pain: adequate analgesia    Post assessment: no apparent anesthetic complications    Post vital signs: stable    Level of consciousness: responds to stimulation    Nausea/Vomiting: no nausea/vomiting    Complications: none    Transfer of care protocol was followed      Last vitals: Visit Vitals  BP (!) 143/53 (BP Location: Right arm, Patient Position: Lying)   Pulse 101   Temp 36.4 °C (97.6 °F) (Oral)   Resp 16   Ht 5' 6" (1.676 m)   Wt 107 kg (236 lb)   SpO2 98%   Breastfeeding No   BMI 38.09 kg/m²     "

## 2024-07-11 NOTE — INTERVAL H&P NOTE
The patient has been examined and the H&P has been reviewed:    I concur with the findings and no changes have occurred since H&P was written.    Surgery risks, benefits and alternative options discussed and understood by patient/family.          Active Hospital Problems    Diagnosis  POA    *Acute medial meniscus tear, right, initial encounter [S83.301A]  Yes      Resolved Hospital Problems   No resolved problems to display.

## 2024-07-11 NOTE — BRIEF OP NOTE
"[facility] - Orthopedic Periop Services  Brief Operative Note    Surgery Date: 7/11/2024     Surgeons and Role:     * Balaji Mcfarlane MD - Primary    Assisting Surgeon: None    Pre-op Diagnosis:  Acute medial meniscus tear, right, initial encounter [S83.241A]    Post-op Diagnosis:  Post-Op Diagnosis Codes:     * Acute medial meniscus tear, right, initial encounter [S83.241A]    Procedure(s) (LRB):  ARTHROSCOPY, KNEE, WITH PARTIAL MEDIAL AND LATERAL MENISCECTOMY (Right)    Anesthesia: General    Operative Findings:  Patient underwent a right knee arthroscopy without complication.    Estimated Blood Loss: * No values recorded between 7/11/2024  1:55 PM and 7/11/2024  2:23 PM *         Specimens:   Specimen (24h ago, onward)      None              Discharge Note    OUTCOME:     DISPOSITION:     FINAL DIAGNOSIS:  Acute medial meniscus tear, right, initial encounter    FOLLOWUP:     DISCHARGE INSTRUCTIONS:    Discharge Procedure Orders   WALKER FOR HOME USE     Order Specific Question Answer Comments   Type of Walker: Adult (5'4"-6'6")    With wheels? Yes    Height: 5' 6" (1.676 m)    Weight: 107 kg (236 lb)    Length of need (1-99 months): 2    Please check all that apply: Patient is unable to safely ambulate without equipment.      Diet general     Keep surgical extremity elevated     Ice to affected area   Order Comments: using barrier between ice and skin (specify duration&frequency)     Remove dressing in 72 hours   Order Comments: Keep dressing in place for 72 hours     Change dressing (specify)   Order Comments: Dressing change: one time per day beginning 72 hours post op.     Call MD for:  temperature >100.4     Call MD for:  persistent nausea and vomiting     Call MD for:  severe uncontrolled pain     Call MD for:  difficulty breathing, headache or visual disturbances     Call MD for:  redness, tenderness, or signs of infection (pain, swelling, redness, odor or green/yellow discharge around incision site) "     Call MD for:  hives     Call MD for:  persistent dizziness or light-headedness     Call MD for:  extreme fatigue     Activity as tolerated     Shower on day dressing removed (No bath)     Weight bearing as tolerated

## 2024-07-11 NOTE — PROGRESS NOTES
1500 RELEASED TO [facility] RN AWAKE, ALERT. V/S 122/-83-92%. FAMILY AT BEDSIDE.

## 2024-07-11 NOTE — ANESTHESIA PREPROCEDURE EVALUATION
07/11/2024  Maureen Easley is a 63 y.o., female.      Pre-op Assessment    I have reviewed the Patient Summary Reports.     I have reviewed the Nursing Notes. I have reviewed the NPO Status.   I have reviewed the Medications.     Review of Systems  Anesthesia Hx:  No problems with previous Anesthesia                Social:  Non-Smoker, No Alcohol Use       Hematology/Oncology:  Hematology Normal   Oncology Normal                                   EENT/Dental:  EENT/Dental Normal           Cardiovascular:     Hypertension           hyperlipidemia                             Pulmonary:  Pulmonary Normal                       Renal/:  Renal/ Normal                 Hepatic/GI:  Hepatic/GI Normal                 Musculoskeletal:  Musculoskeletal Normal                Neurological:  Neurology Normal                                      Endocrine:  Endocrine Normal          Morbid Obesity / BMI > 40  Dermatological:  Skin Normal    Psych:   anxiety                 Physical Exam  General: Well nourished    Airway:  Mallampati: III / III  Mouth Opening: Normal  TM Distance: > 6 cm  Tongue: Normal  Neck ROM: Normal ROM    Chest/Lungs:  Clear to auscultation, Normal Respiratory Rate    Heart:  Rate: Normal  Rhythm: Regular Rhythm        Anesthesia Plan  Type of Anesthesia, risks & benefits discussed:    Anesthesia Type: Gen Supraglottic Airway  Intra-op Monitoring Plan: Standard ASA Monitors  Post Op Pain Control Plan: multimodal analgesia  Induction:  IV  Informed Consent: Informed consent signed with the Patient and all parties understand the risks and agree with anesthesia plan.  All questions answered.   ASA Score: 2  Day of Surgery Review of History & Physical: H&P Update referred to the surgeon/provider.I have interviewed and examined the patient. I have reviewed the patient's H&P dated:     Ready For Surgery  From Anesthesia Perspective.     .

## 2024-07-11 NOTE — ANESTHESIA POSTPROCEDURE EVALUATION
Anesthesia Post Evaluation    Patient: Maureen Easley    Procedure(s) Performed: Procedure(s) (LRB):  ARTHROSCOPY, KNEE, WITH PARTIAL MEDIAL AND LATERAL MENISCECTOMY (Right)    Final Anesthesia Type: general      Patient location during evaluation: PACU  Patient participation: Yes- Able to Participate  Level of consciousness: awake and alert and oriented  Post-procedure vital signs: reviewed and stable  Pain management: adequate  Airway patency: patent  OLI mitigation strategies: Multimodal analgesia  PONV status at discharge: No PONV  Anesthetic complications: no      Cardiovascular status: hemodynamically stable  Respiratory status: unassisted and spontaneous ventilation  Hydration status: euvolemic  Follow-up not needed.              Vitals Value Taken Time   /52 07/11/24 1445   Temp 36.4 °C (97.6 °F) 07/11/24 1426   Pulse 102 07/11/24 1445   Resp 16 07/11/24 1445   SpO2 93 % 07/11/24 1445   Vitals shown include unfiled device data.      No case tracking events are documented in the log.      Pain/May Score: May Score: 8 (7/11/2024  2:24 PM)

## 2024-07-11 NOTE — PLAN OF CARE
Consult for rolling walker. Spoke with sina in outpatient surgery. Dc plan home today. Will obtain rolling walker from the medical store. Spoke with noah. They will deliver to patient room 24.

## 2024-07-11 NOTE — PROGRESS NOTES
1424 RECEIVED TO RR EASILY AROUSED. ORAL AIRWAY REMOVE. O2 VIA FM. NO RESP. DISTRESS NOTED. O2 VIA FM. FOLLOWS COMMANDS, NANI. NO C/O PAIN. DRESSING RIGHT KNEE/LEG D/I. RIGHT PEDAL PULSE +, FOOT COOL, PINK, MOBILE. IV INFUSING WELL LEFT FOREARM 22G. CATH. SEE FLOW SHEET.      1454 AWAKE, ALERT. NO C/O PAIN. NO DISTRESS NOTED. TRANSFERRED TO ROOM.

## 2024-07-15 ENCOUNTER — PATIENT MESSAGE (OUTPATIENT)
Dept: ADMINISTRATIVE | Facility: HOSPITAL | Age: 64
End: 2024-07-15

## 2024-07-23 ENCOUNTER — OFFICE VISIT (OUTPATIENT)
Dept: CARDIOLOGY | Facility: CLINIC | Age: 64
End: 2024-07-23
Payer: MEDICARE

## 2024-07-23 VITALS
HEART RATE: 82 BPM | BODY MASS INDEX: 38.87 KG/M2 | SYSTOLIC BLOOD PRESSURE: 122 MMHG | DIASTOLIC BLOOD PRESSURE: 70 MMHG | WEIGHT: 240.81 LBS | OXYGEN SATURATION: 98 %

## 2024-07-23 DIAGNOSIS — R06.09 OTHER FORM OF DYSPNEA: Primary | ICD-10-CM

## 2024-07-23 PROCEDURE — 99215 OFFICE O/P EST HI 40 MIN: CPT | Mod: PBBFAC | Performed by: STUDENT IN AN ORGANIZED HEALTH CARE EDUCATION/TRAINING PROGRAM

## 2024-07-23 PROCEDURE — 1101F PT FALLS ASSESS-DOCD LE1/YR: CPT | Mod: CPTII,,, | Performed by: STUDENT IN AN ORGANIZED HEALTH CARE EDUCATION/TRAINING PROGRAM

## 2024-07-23 PROCEDURE — 3008F BODY MASS INDEX DOCD: CPT | Mod: CPTII,,, | Performed by: STUDENT IN AN ORGANIZED HEALTH CARE EDUCATION/TRAINING PROGRAM

## 2024-07-23 PROCEDURE — 3078F DIAST BP <80 MM HG: CPT | Mod: CPTII,,, | Performed by: STUDENT IN AN ORGANIZED HEALTH CARE EDUCATION/TRAINING PROGRAM

## 2024-07-23 PROCEDURE — 3288F FALL RISK ASSESSMENT DOCD: CPT | Mod: CPTII,,, | Performed by: STUDENT IN AN ORGANIZED HEALTH CARE EDUCATION/TRAINING PROGRAM

## 2024-07-23 PROCEDURE — 99999 PR PBB SHADOW E&M-EST. PATIENT-LVL V: CPT | Mod: PBBFAC,,, | Performed by: STUDENT IN AN ORGANIZED HEALTH CARE EDUCATION/TRAINING PROGRAM

## 2024-07-23 PROCEDURE — 3044F HG A1C LEVEL LT 7.0%: CPT | Mod: CPTII,,, | Performed by: STUDENT IN AN ORGANIZED HEALTH CARE EDUCATION/TRAINING PROGRAM

## 2024-07-23 PROCEDURE — 1159F MED LIST DOCD IN RCRD: CPT | Mod: CPTII,,, | Performed by: STUDENT IN AN ORGANIZED HEALTH CARE EDUCATION/TRAINING PROGRAM

## 2024-07-23 PROCEDURE — 99213 OFFICE O/P EST LOW 20 MIN: CPT | Mod: S$PBB,,, | Performed by: STUDENT IN AN ORGANIZED HEALTH CARE EDUCATION/TRAINING PROGRAM

## 2024-07-23 PROCEDURE — 3074F SYST BP LT 130 MM HG: CPT | Mod: CPTII,,, | Performed by: STUDENT IN AN ORGANIZED HEALTH CARE EDUCATION/TRAINING PROGRAM

## 2024-07-23 PROCEDURE — 4010F ACE/ARB THERAPY RXD/TAKEN: CPT | Mod: CPTII,,, | Performed by: STUDENT IN AN ORGANIZED HEALTH CARE EDUCATION/TRAINING PROGRAM

## 2024-07-23 NOTE — PROGRESS NOTES
PCP: Mallika Pruitt FNP-C    Referring Provider:       Subjective:   Maureen Easley is a 64 y.o. female with hx of HTN, dyslipidemia, pre-diabetes and osteoarthritis, who presents for a follow-up.    7/23/24:  Presents for follow up.  Notes she is doing much better.  Chest pain has resolved.  No recurrence of palpitations since patient cut back on caffeine.  Notes she had knee surgery 2 weeks ago with no complications.  She does have occasional episodes of dyspnea with exertion.     4/23/24: Presents for a new patient visit. Endorsed 1 month of intermittent episodes of chest pain, dyspnea w/ and w/o exertion, leg swelling and palpitations. Chest pain described as chest pressure w/ radiation to left shoulder and arm. Initial episode prompted ED presentation on 3/24/24-> pain was ongoing for almost 3 days per report. No acute ischemic EKG changes and hs-trop x1. Since then she has had a few episodes of pain, usually last about 1 hour. Chest pain is not related to exertion. She also notes palpitations - 1-2 times/ week, lasting for upto 5 minutes, resolve w/o any intervention. Unable to assess for orthopnea, sleeps on her side. Denies lightheadedness or syncope.     Fhx: Significant FH of CAD. Father had an MI and CABG in his 40s. Brother and sister both have had stents placed.   Shx: Never smoker     EKG 4/23/2024: Normal sinus rhythm   3/24/2024: Normal sinus rhythm    ECHO   Results for orders placed during the hospital encounter of 05/21/24    Echo    Interpretation Summary    Left Ventricle: The left ventricle is normal in size. Mild septal thickening. There is normal systolic function. Biplane (2D) method of discs ejection fraction is 55%. There is normal diastolic function.    Right Ventricle: Normal right ventricular cavity size. Systolic function is normal.    Aortic Valve: The aortic valve is a trileaflet valve.    Tricuspid Valve: There is mild regurgitation.    Pulmonic Valve: There is mild  regurgitation.    Pulmonary Artery: The estimated pulmonary artery systolic pressure is 35 mmHg.    IVC/SVC: Normal venous pressure at 3 mmHg.    NM MPI 5/21/24:  Impression:     1.  Scintigraphically negative for ischemia.  2. Small size, mild intensity fixed anteroseptal defect with normal wall motion and thickening, suggestive of artifact.  3. the global left ventricular systolic function is normal with an LV ejection fraction of 76 % and no evidence of LV dilatation. Wall motion is normal.        Lab Results   Component Value Date     06/26/2024    K 4.4 06/26/2024     06/26/2024    CO2 32 06/26/2024    BUN 25 (H) 06/26/2024    CREATININE 0.87 06/26/2024    CALCIUM 9.3 06/26/2024    ANIONGAP 7 06/26/2024    ESTGFRAFRICA 76 12/10/2020    EGFRNONAA 67 11/19/2021       Lab Results   Component Value Date    CHOL 208 (H) 04/03/2024    CHOL 196 05/20/2022     Lab Results   Component Value Date    HDL 56 04/03/2024    HDL 64 (H) 05/20/2022     Lab Results   Component Value Date    LDLCALC 108 04/03/2024    LDLCALC 96 05/20/2022     Lab Results   Component Value Date    TRIG 222 (H) 04/03/2024    TRIG 178 (H) 05/20/2022     Lab Results   Component Value Date    CHOLHDL 3.7 04/03/2024    CHOLHDL 3.1 05/20/2022       Lab Results   Component Value Date    WBC 10.22 06/26/2024    HGB 12.3 06/26/2024    HCT 39.2 06/26/2024    MCV 85.4 06/26/2024     06/26/2024           Current Outpatient Medications:     albuterol (PROVENTIL/VENTOLIN HFA) 90 mcg/actuation inhaler, Inhale 2 puffs into the lungs every 4 (four) hours as needed., Disp: , Rfl:     ARIPiprazole (ABILIFY) 10 MG Tab, Take 10 mg by mouth every morning., Disp: , Rfl:     aspirin (ECOTRIN) 81 MG EC tablet, Take 81 mg by mouth nightly., Disp: , Rfl:     atorvastatin (LIPITOR) 10 MG tablet, Take 1 tablet (10 mg total) by mouth every evening., Disp: 90 tablet, Rfl: 1    DULoxetine (CYMBALTA) 30 MG capsule, Take 30 mg by mouth once daily., Disp: , Rfl:      esomeprazole (NEXIUM) 40 MG capsule, Take 1 capsule (40 mg total) by mouth before breakfast., Disp: 90 capsule, Rfl: 0    estradioL (ESTRACE) 0.01 % (0.1 mg/gram) vaginal cream, Place 1 g vaginally twice a week., Disp: 42.5 g, Rfl: 11    fexofenadine (ALLEGRA ALLERGY) 180 MG tablet, Take 1 tablet (180 mg total) by mouth once daily., Disp: 90 tablet, Rfl: 0    fluticasone propionate (FLONASE) 50 mcg/actuation nasal spray, SPRAY 2 SPRAYS INTO EACH NOSTRIL EVERY DAY, Disp: 48 mL, Rfl: 0    gabapentin (NEURONTIN) 300 MG capsule, Take 1 capsule (300 mg total) by mouth 3 (three) times daily., Disp: 90 capsule, Rfl: 11    hydroCHLOROthiazide (HYDRODIURIL) 25 MG tablet, Take 1 tablet (25 mg total) by mouth once daily., Disp: 90 tablet, Rfl: 0    HYDROcodone-acetaminophen (NORCO) 5-325 mg per tablet, Take 1 tablet by mouth daily as needed., Disp: , Rfl:     lisinopriL (PRINIVIL,ZESTRIL) 20 MG tablet, Take 1 tablet (20 mg total) by mouth once daily., Disp: 90 tablet, Rfl: 0    metroNIDAZOLE (METROGEL) 0.75 % gel, Apply topically 2 (two) times daily., Disp: 45 g, Rfl: 5    mirtazapine (REMERON) 15 MG tablet, Take 15 mg by mouth every evening., Disp: , Rfl:     montelukast (SINGULAIR) 10 mg tablet, TAKE 1 TABLET EVERY EVENING, Disp: 90 tablet, Rfl: 3    pramipexole (MIRAPEX) 0.5 MG tablet, Take 1 tablet (0.5 mg total) by mouth every evening., Disp: 90 tablet, Rfl: 1    traZODone (DESYREL) 100 MG tablet, TAKE 1 TABLET BY MOUTH EVERY DAY AT BEDTIME AS DIRECTED, Disp: , Rfl:     HYDROcodone-acetaminophen (NORCO)  mg per tablet, Take 1 tablet by mouth every 6 (six) hours as needed for pain... May cause drowsiness (Patient not taking: Reported on 7/23/2024), Disp: 28 tablet, Rfl: 0    Review of Systems   Constitutional:  Negative for chills, diaphoresis, fever and malaise/fatigue.   Respiratory:  Positive for shortness of breath. Negative for cough.    Cardiovascular:  Negative for chest pain, palpitations, orthopnea,  claudication, leg swelling and PND.   Gastrointestinal:  Negative for abdominal pain, heartburn, nausea and vomiting.   Neurological:  Negative for dizziness.       Objective:   /70 (BP Location: Right arm, Patient Position: Sitting)   Pulse 82   Wt 109.2 kg (240 lb 12.8 oz)   SpO2 98%   BMI 38.87 kg/m²     Physical Exam  Constitutional:       General: She is not in acute distress.     Appearance: Normal appearance.   Neck:      Comments: No jugular venous distension  Cardiovascular:      Rate and Rhythm: Normal rate and regular rhythm.      Pulses: Normal pulses.      Heart sounds: Normal heart sounds. No murmur heard.     No friction rub. No gallop.   Pulmonary:      Effort: Pulmonary effort is normal.      Breath sounds: Normal breath sounds. No wheezing or rales.   Musculoskeletal:      Cervical back: Neck supple.      Right lower leg: No edema.      Left lower leg: No edema.   Skin:     General: Skin is warm and dry.   Neurological:      Mental Status: She is alert.           Assessment:     1. Other form of dyspnea                Plan:   No problem-specific Assessment & Plan notes found for this encounter.    Chest pain (resolved)  - NM MPI negative for ischemia     Palpitations (resolved)  No recurrence since patient cut back on caffeine  - 14-day ambulatory cardiac monitor with no cardiac arrhythmia    Dyspnea w/ exertion  NYHA class II  Euvolemic on exam  - echo with normal biventricular function and no significant valvular disease    Follow-up in 6 months

## 2024-07-24 ENCOUNTER — OFFICE VISIT (OUTPATIENT)
Dept: ORTHOPEDICS | Facility: CLINIC | Age: 64
End: 2024-07-24
Payer: MEDICARE

## 2024-07-24 DIAGNOSIS — Z98.890 STATUS POST ARTHROSCOPY OF RIGHT KNEE: Primary | ICD-10-CM

## 2024-07-24 PROCEDURE — 99024 POSTOP FOLLOW-UP VISIT: CPT | Mod: ,,, | Performed by: ORTHOPAEDIC SURGERY

## 2024-07-24 PROCEDURE — 3044F HG A1C LEVEL LT 7.0%: CPT | Mod: CPTII,,, | Performed by: ORTHOPAEDIC SURGERY

## 2024-07-24 PROCEDURE — 1159F MED LIST DOCD IN RCRD: CPT | Mod: CPTII,,, | Performed by: ORTHOPAEDIC SURGERY

## 2024-07-24 PROCEDURE — 4010F ACE/ARB THERAPY RXD/TAKEN: CPT | Mod: CPTII,,, | Performed by: ORTHOPAEDIC SURGERY

## 2024-07-24 PROCEDURE — 99999 PR PBB SHADOW E&M-EST. PATIENT-LVL III: CPT | Mod: PBBFAC,,, | Performed by: ORTHOPAEDIC SURGERY

## 2024-07-24 PROCEDURE — 99213 OFFICE O/P EST LOW 20 MIN: CPT | Mod: PBBFAC | Performed by: ORTHOPAEDIC SURGERY

## 2024-07-24 NOTE — PROGRESS NOTES
Patient is here follow-up of the right knee arthroscopy.  She was having little bit of soreness over the medial aspect of the knee.  Wounds are clean and dry.  Stitches removed.  Let her weightbear as tolerates.  She can do activities as tolerates.  I will follow up 4 weeks she is having any problems.  Otherwise she can cancel in follow-up p.r.n..  Neurovascularly intact distally.  Doing well

## 2024-08-09 DIAGNOSIS — Z71.89 COMPLEX CARE COORDINATION: ICD-10-CM

## 2024-08-24 ENCOUNTER — HOSPITAL ENCOUNTER (EMERGENCY)
Facility: HOSPITAL | Age: 64
Discharge: HOME | End: 2024-08-24
Attending: EMERGENCY MEDICINE
Payer: MEDICARE

## 2024-08-24 VITALS
WEIGHT: 241.5 LBS | SYSTOLIC BLOOD PRESSURE: 146 MMHG | DIASTOLIC BLOOD PRESSURE: 71 MMHG | OXYGEN SATURATION: 95 % | HEIGHT: 66 IN | BODY MASS INDEX: 38.81 KG/M2 | TEMPERATURE: 98 F | RESPIRATION RATE: 16 BRPM | HEART RATE: 79 BPM

## 2024-08-24 DIAGNOSIS — M25.512 ACUTE PAIN OF LEFT SHOULDER: Primary | ICD-10-CM

## 2024-08-24 DIAGNOSIS — R07.9 CHEST PAIN: ICD-10-CM

## 2024-08-24 LAB
ALBUMIN SERPL BCP-MCNC: 3.5 G/DL (ref 3.5–5)
ALBUMIN/GLOB SERPL: 1 {RATIO}
ALP SERPL-CCNC: 75 U/L (ref 55–142)
ALT SERPL W P-5'-P-CCNC: 23 U/L (ref 13–56)
ANION GAP SERPL CALCULATED.3IONS-SCNC: 7 MMOL/L (ref 7–16)
AST SERPL W P-5'-P-CCNC: 22 U/L (ref 15–37)
BASOPHILS # BLD AUTO: 0.03 K/UL (ref 0–0.2)
BASOPHILS NFR BLD AUTO: 0.3 % (ref 0–1)
BILIRUB SERPL-MCNC: 0.2 MG/DL (ref ?–1.2)
BUN SERPL-MCNC: 26 MG/DL (ref 7–18)
BUN/CREAT SERPL: 31 (ref 6–20)
CALCIUM SERPL-MCNC: 9.3 MG/DL (ref 8.5–10.1)
CHLORIDE SERPL-SCNC: 102 MMOL/L (ref 98–107)
CO2 SERPL-SCNC: 31 MMOL/L (ref 21–32)
CREAT SERPL-MCNC: 0.83 MG/DL (ref 0.55–1.02)
DIFFERENTIAL METHOD BLD: ABNORMAL
EGFR (NO RACE VARIABLE) (RUSH/TITUS): 78 ML/MIN/1.73M2
EOSINOPHIL # BLD AUTO: 0.12 K/UL (ref 0–0.5)
EOSINOPHIL NFR BLD AUTO: 1.3 % (ref 1–4)
ERYTHROCYTE [DISTWIDTH] IN BLOOD BY AUTOMATED COUNT: 15.9 % (ref 11.5–14.5)
GLOBULIN SER-MCNC: 3.4 G/DL (ref 2–4)
GLUCOSE SERPL-MCNC: 115 MG/DL (ref 74–106)
HCT VFR BLD AUTO: 36 % (ref 38–47)
HGB BLD-MCNC: 11.1 G/DL (ref 12–16)
IMM GRANULOCYTES # BLD AUTO: 0.03 K/UL (ref 0–0.04)
IMM GRANULOCYTES NFR BLD: 0.3 % (ref 0–0.4)
LYMPHOCYTES # BLD AUTO: 3 K/UL (ref 1–4.8)
LYMPHOCYTES NFR BLD AUTO: 33 % (ref 27–41)
MCH RBC QN AUTO: 26.9 PG (ref 27–31)
MCHC RBC AUTO-ENTMCNC: 30.8 G/DL (ref 32–36)
MCV RBC AUTO: 87.2 FL (ref 80–96)
MONOCYTES # BLD AUTO: 0.73 K/UL (ref 0–0.8)
MONOCYTES NFR BLD AUTO: 8 % (ref 2–6)
MPC BLD CALC-MCNC: 10.9 FL (ref 9.4–12.4)
NEUTROPHILS # BLD AUTO: 5.18 K/UL (ref 1.8–7.7)
NEUTROPHILS NFR BLD AUTO: 57.1 % (ref 53–65)
NRBC # BLD AUTO: 0 X10E3/UL
NRBC, AUTO (.00): 0 %
NT-PROBNP SERPL-MCNC: 55 PG/ML (ref 1–125)
PLATELET # BLD AUTO: 250 K/UL (ref 150–400)
POTASSIUM SERPL-SCNC: 4.2 MMOL/L (ref 3.5–5.1)
PROT SERPL-MCNC: 6.9 G/DL (ref 6.4–8.2)
RBC # BLD AUTO: 4.13 M/UL (ref 4.2–5.4)
SODIUM SERPL-SCNC: 136 MMOL/L (ref 136–145)
TROPONIN I SERPL DL<=0.01 NG/ML-MCNC: <4 PG/ML
WBC # BLD AUTO: 9.09 K/UL (ref 4.5–11)

## 2024-08-24 PROCEDURE — 83880 ASSAY OF NATRIURETIC PEPTIDE: CPT | Performed by: EMERGENCY MEDICINE

## 2024-08-24 PROCEDURE — 84484 ASSAY OF TROPONIN QUANT: CPT | Performed by: EMERGENCY MEDICINE

## 2024-08-24 PROCEDURE — 80053 COMPREHEN METABOLIC PANEL: CPT | Performed by: EMERGENCY MEDICINE

## 2024-08-24 PROCEDURE — 99285 EMERGENCY DEPT VISIT HI MDM: CPT | Mod: 25

## 2024-08-24 PROCEDURE — 63600175 PHARM REV CODE 636 W HCPCS: Performed by: EMERGENCY MEDICINE

## 2024-08-24 PROCEDURE — 85025 COMPLETE CBC W/AUTO DIFF WBC: CPT | Performed by: EMERGENCY MEDICINE

## 2024-08-24 PROCEDURE — 93005 ELECTROCARDIOGRAM TRACING: CPT

## 2024-08-24 PROCEDURE — 96372 THER/PROPH/DIAG INJ SC/IM: CPT | Performed by: EMERGENCY MEDICINE

## 2024-08-24 RX ORDER — DEXAMETHASONE SODIUM PHOSPHATE 4 MG/ML
8 INJECTION, SOLUTION INTRA-ARTICULAR; INTRALESIONAL; INTRAMUSCULAR; INTRAVENOUS; SOFT TISSUE
Status: COMPLETED | OUTPATIENT
Start: 2024-08-24 | End: 2024-08-24

## 2024-08-24 RX ORDER — PREDNISONE 50 MG/1
50 TABLET ORAL DAILY
Qty: 6 TABLET | Refills: 0 | Status: SHIPPED | OUTPATIENT
Start: 2024-08-24

## 2024-08-24 RX ADMIN — DEXAMETHASONE SODIUM PHOSPHATE 8 MG: 4 INJECTION, SOLUTION INTRA-ARTICULAR; INTRALESIONAL; INTRAMUSCULAR; INTRAVENOUS; SOFT TISSUE at 04:08

## 2024-08-24 NOTE — DISCHARGE INSTRUCTIONS
Take medication as prescribed.  Follow up in clinic with primary care provider or pain management doctor in 2-3 days if symptoms persist.  Return to emergency department for any worsening or further problems.

## 2024-08-24 NOTE — ED TRIAGE NOTES
Presents to ED for complaints of left shoulder pain that radiates up to neck and also down to elbow since Thursday.  Denies injury or heavy lifting, but does report reaching down into a top load washer to get clothes out of it.

## 2024-08-24 NOTE — ED PROVIDER NOTES
Encounter Date: 8/24/2024    SCRIBE #1 NOTE: I, Urvashi Sabrina, am scribing for, and in the presence of,  Fabio Garcia MD.       History     Chief Complaint   Patient presents with    Shoulder Pain    Neck Pain     This patient is a 64 y.o. female that presents to the ED with c/o Shoulder and Neck Pain. The patient states her shoulder pain radiates up to neck and also down to elbow since Thursday.  Denies injury or heavy lifting. Patient states she has seen the cardiologist Dr. Taylor and was given a stress test that was fine. She states not being able to sleep at night because of the pain on her left side Neck and shoulder pain that comes and goes. Patient said she feels tingling some of her fingers also. The pain lessens when she raises her arm up. Patient takes Narco for back pain but it does not help her shoulder and neck pain.    The history is provided by the patient and the spouse. No  was used.     Review of patient's allergies indicates:   Allergen Reactions    Sulfamethoxazole-trimethoprim      Other reaction(s): Unknown    Clarithromycin Rash     Past Medical History:   Diagnosis Date    Bipolar disorder     Depression     Hyperlipidemia     Hypertension      Past Surgical History:   Procedure Laterality Date    COLONOSCOPY  08/21/2019    hx polyps    HYSTERECTOMY  1998    KNEE ARTHROSCOPY W/ MENISCECTOMY Right 7/11/2024    Procedure: ARTHROSCOPY, KNEE, WITH PARTIAL MEDIAL AND LATERAL MENISCECTOMY;  Surgeon: Balaji Mcfarlane MD;  Location: [facility];  Service: Orthopedics;  Laterality: Right;     Family History   Problem Relation Name Age of Onset    Hypertension Other      Breast cancer Neg Hx      Colon cancer Neg Hx      Ovarian cancer Neg Hx       Social History     Tobacco Use    Smoking status: Never    Smokeless tobacco: Never   Substance Use Topics    Alcohol use: Never    Drug use: Never     Review of Systems   Constitutional:  Negative for fever.   Respiratory:  Negative  for cough, chest tightness and shortness of breath.    Cardiovascular:  Negative for leg swelling.   Gastrointestinal:  Negative for diarrhea, nausea and vomiting.       Physical Exam     Initial Vitals [08/24/24 1542]   BP Pulse Resp Temp SpO2   (!) 145/82 94 18 98.4 °F (36.9 °C) (!) 94 %      MAP       --         Physical Exam    Nursing note and vitals reviewed.  HENT:   Head: Normocephalic and atraumatic.   Mouth/Throat: Oropharynx is clear and moist.   Eyes: Pupils are equal, round, and reactive to light.   Neck: Neck supple.   Patient has neck pain.   Normal range of motion.  Cardiovascular:  Normal rate and regular rhythm.           Pulmonary/Chest: Effort normal and breath sounds normal.   Abdominal: Abdomen is soft. She exhibits no distension.   Musculoskeletal:         General: Normal range of motion.      Left shoulder: Tenderness present.        Arms:       Cervical back: Normal range of motion and neck supple.      Comments: Patient has anterior and posterior  shoulder pain.     Neurological: She is alert.   Skin: Skin is warm. Capillary refill takes less than 2 seconds.   Psychiatric: She has a normal mood and affect.         ED Course   Procedures  Labs Reviewed   COMPREHENSIVE METABOLIC PANEL - Abnormal       Result Value    Sodium 136      Potassium 4.2      Chloride 102      CO2 31      Anion Gap 7      Glucose 115 (*)     BUN 26 (*)     Creatinine 0.83      BUN/Creatinine Ratio 31 (*)     Calcium 9.3      Total Protein 6.9      Albumin 3.5      Globulin 3.4      A/G Ratio 1.0      Bilirubin, Total 0.2      Alk Phos 75      ALT 23      AST 22      eGFR 78     CBC WITH DIFFERENTIAL - Abnormal    WBC 9.09      RBC 4.13 (*)     Hemoglobin 11.1 (*)     Hematocrit 36.0 (*)     MCV 87.2      MCH 26.9 (*)     MCHC 30.8 (*)     RDW 15.9 (*)     Platelet Count 250      MPV 10.9      Neutrophils % 57.1      Lymphocytes % 33.0      Monocytes % 8.0 (*)     Eosinophils % 1.3      Basophils % 0.3      Immature  Granulocytes % 0.3      nRBC, Auto 0.0      Neutrophils, Abs 5.18      Lymphocytes, Absolute 3.00      Monocytes, Absolute 0.73      Eosinophils, Absolute 0.12      Basophils, Absolute 0.03      Immature Granulocytes, Absolute 0.03      nRBC, Absolute 0.00      Diff Type Auto     TROPONIN I - Normal    Troponin I High Sensitivity <4.0     NT-PRO NATRIURETIC PEPTIDE - Normal    ProBNP 55     CBC W/ AUTO DIFFERENTIAL    Narrative:     The following orders were created for panel order CBC auto differential.  Procedure                               Abnormality         Status                     ---------                               -----------         ------                     CBC with Differential[[id number]]       Abnormal            Final result                 Please view results for these tests on the individual orders.     EKG Readings: (Independently Interpreted)   Heart Rate: 77 bpm.   Normal sinus rhythm   Minimal voltage criteria for LVH, may be normal variant ( R in aVL )   Cannot rule out Anterior infarct ,age undetermined   Abnormal ECG   When compared with ECG of 23-APR-2024 08:02,   No significant change was found       Imaging Results              X-Ray Chest AP Portable (Final result)  Result time 08/24/24 16:53:16      Final result by Nikunj Cross MD (08/24/24 16:53:16)                   Impression:      No acute process compared to the previous study      Electronically signed by: Nikunj Cross  Date:    08/24/2024  Time:    16:53               Narrative:    EXAMINATION:  XR CHEST AP PORTABLE    CLINICAL HISTORY:  Chest Pain;.    COMPARISON:  March 24, 2024    TECHNIQUE:  Chest x-ray AP portable erect    FINDINGS:  Cardiac silhouette is not enlarged.  Mediastinal contours are stable.  There is no pulmonary vascular engorgement.    Lungs are generally clear for shallow inspiration.  No gross pleural effusion    Osseous structures are unchanged                                    X-Rays:    Independently Interpreted Readings:   Other Readings:  EXAMINATION:  XR CHEST AP PORTABLE     CLINICAL HISTORY:  Chest Pain;.     COMPARISON:  March 24, 2024     TECHNIQUE:  Chest x-ray AP portable erect     FINDINGS:  Cardiac silhouette is not enlarged.  Mediastinal contours are stable.  There is no pulmonary vascular engorgement.     Lungs are generally clear for shallow inspiration.  No gross pleural effusion     Osseous structures are unchanged     Impression:     No acute process compared to the previous study        Electronically signed by:Nikunj Cross  Date:                                            08/24/2024  Time:                                           16:53      Medications   dexAMETHasone injection 8 mg (8 mg Intramuscular Given 8/24/24 1649)     Medical Decision Making  Amount and/or Complexity of Data Reviewed  Labs: ordered.  Radiology: ordered.    Risk  Prescription drug management.              Attending Attestation:           Physician Attestation for Scribe:  Physician Attestation Statement for Scribe #1: I, Fabio Garcia MD, reviewed documentation, as scribed by Urvashi Bennett in my presence, and it is both accurate and complete.             ED Course as of 08/24/24 1743   Sat Aug 24, 2024   1602 Medical decision-making:  Differential diagnosis includes neck pain, chest pain, musculoskeletal pain, STEMI, NSTEMI, ACS.  All testing ordered and interpreted by me. [BB]   1647 Chest x-ray by my interpretation shows no acute disease. [BB]   1647 EKG by my interpretation shows sinus rhythm, rate of 77, no acute ST segment changes. [BB]   1700 08/24/24 1655  X-Ray Chest AP Portable  Performed: 08/24/24 1611  Final         Impression: No acute process compared to the previous study Electronically signed by: Nikunj Cross Date: 08/24/2024 Time: 16:53       [CM]   1713 CBC is normal. [BB]   1741 Troponin is normal.  Pro BNP is normal.  CMP is normal. [BB]      ED Course User Index  [BB] Radha  Fabio BAEZA MD  [CM] Urvashi Bennett                           Clinical Impression:  Final diagnoses:  [R07.9] Chest pain  [M25.512] Acute pain of left shoulder (Primary)          ED Disposition Condition    Discharge Stable          ED Prescriptions       Medication Sig Dispense Start Date End Date Auth. Provider    predniSONE (DELTASONE) 50 MG Tab Take 1 tablet (50 mg total) by mouth once daily. 6 tablet 8/24/2024 -- Fabio Garcia MD          Follow-up Information    None          Fabio Garcia MD  08/24/24 4810

## 2024-09-16 DIAGNOSIS — I10 UNCONTROLLED HYPERTENSION: ICD-10-CM

## 2024-09-16 RX ORDER — LISINOPRIL 20 MG/1
20 TABLET ORAL
Qty: 90 TABLET | Refills: 3 | Status: SHIPPED | OUTPATIENT
Start: 2024-09-16

## 2024-10-02 ENCOUNTER — ANESTHESIA EVENT (OUTPATIENT)
Dept: GASTROENTEROLOGY | Facility: HOSPITAL | Age: 64
End: 2024-10-02
Payer: MEDICARE

## 2024-10-02 ENCOUNTER — HOSPITAL ENCOUNTER (OUTPATIENT)
Dept: GASTROENTEROLOGY | Facility: HOSPITAL | Age: 64
Discharge: HOME | End: 2024-10-02
Payer: MEDICARE

## 2024-10-02 ENCOUNTER — ANESTHESIA (OUTPATIENT)
Dept: GASTROENTEROLOGY | Facility: HOSPITAL | Age: 64
End: 2024-10-02
Payer: MEDICARE

## 2024-10-02 VITALS
BODY MASS INDEX: 36.96 KG/M2 | HEIGHT: 66 IN | RESPIRATION RATE: 17 BRPM | WEIGHT: 230 LBS | SYSTOLIC BLOOD PRESSURE: 145 MMHG | TEMPERATURE: 98 F | HEART RATE: 81 BPM | OXYGEN SATURATION: 97 % | DIASTOLIC BLOOD PRESSURE: 61 MMHG

## 2024-10-02 DIAGNOSIS — Z86.0100 HISTORY OF COLON POLYPS: Primary | ICD-10-CM

## 2024-10-02 DIAGNOSIS — D12.4 ADENOMATOUS POLYP OF DESCENDING COLON: ICD-10-CM

## 2024-10-02 DIAGNOSIS — Z12.11 SCREENING FOR COLON CANCER: ICD-10-CM

## 2024-10-02 DIAGNOSIS — K57.30 COLON, DIVERTICULOSIS: ICD-10-CM

## 2024-10-02 PROCEDURE — 25000003 PHARM REV CODE 250

## 2024-10-02 PROCEDURE — 37000009 HC ANESTHESIA EA ADD 15 MINS

## 2024-10-02 PROCEDURE — 27000284 HC CANNULA NASAL: Performed by: NURSE ANESTHETIST, CERTIFIED REGISTERED

## 2024-10-02 PROCEDURE — 27201423 OPTIME MED/SURG SUP & DEVICES STERILE SUPPLY

## 2024-10-02 PROCEDURE — 88305 TISSUE EXAM BY PATHOLOGIST: CPT | Mod: TC,SUR | Performed by: INTERNAL MEDICINE

## 2024-10-02 PROCEDURE — 63600175 PHARM REV CODE 636 W HCPCS

## 2024-10-02 PROCEDURE — 37000008 HC ANESTHESIA 1ST 15 MINUTES

## 2024-10-02 RX ORDER — LIDOCAINE HYDROCHLORIDE 20 MG/ML
INJECTION, SOLUTION EPIDURAL; INFILTRATION; INTRACAUDAL; PERINEURAL
Status: DISCONTINUED | OUTPATIENT
Start: 2024-10-02 | End: 2024-10-02

## 2024-10-02 RX ORDER — PROPOFOL 10 MG/ML
VIAL (ML) INTRAVENOUS
Status: DISCONTINUED | OUTPATIENT
Start: 2024-10-02 | End: 2024-10-02

## 2024-10-02 RX ORDER — PHENYLEPHRINE HYDROCHLORIDE 10 MG/ML
INJECTION INTRAVENOUS
Status: DISCONTINUED | OUTPATIENT
Start: 2024-10-02 | End: 2024-10-02

## 2024-10-02 RX ADMIN — PROPOFOL 30 MG: 10 INJECTION, EMULSION INTRAVENOUS at 09:10

## 2024-10-02 RX ADMIN — SODIUM CHLORIDE: 9 INJECTION, SOLUTION INTRAVENOUS at 08:10

## 2024-10-02 RX ADMIN — PROPOFOL 50 MG: 10 INJECTION, EMULSION INTRAVENOUS at 09:10

## 2024-10-02 RX ADMIN — PROPOFOL 20 MG: 10 INJECTION, EMULSION INTRAVENOUS at 09:10

## 2024-10-02 RX ADMIN — PHENYLEPHRINE HYDROCHLORIDE 150 MCG: 10 INJECTION INTRAVENOUS at 09:10

## 2024-10-02 RX ADMIN — LIDOCAINE HYDROCHLORIDE 100 MG: 20 INJECTION, SOLUTION INTRAVENOUS at 08:10

## 2024-10-02 RX ADMIN — PROPOFOL 70 MG: 10 INJECTION, EMULSION INTRAVENOUS at 08:10

## 2024-10-02 NOTE — TRANSFER OF CARE
"Anesthesia Transfer of Care Note    Patient: Maureen Easley    Procedure(s) Performed: * No procedures listed *    Patient location: GI    Anesthesia Type: MAC    Transport from OR: Transported from OR on room air with adequate spontaneous ventilation    Post pain: adequate analgesia    Post assessment: no apparent anesthetic complications    Post vital signs: stable    Level of consciousness: sedated and responds to stimulation    Nausea/Vomiting: no nausea/vomiting    Complications: none    Transfer of care protocol was followedComments: Good SV continue, NAD noted, VSS, RTRN    Last vitals: Visit Vitals  BP (!) 107/97 (BP Location: Right arm, Patient Position: Lying)   Pulse 76   Temp 36.6 °C (97.8 °F) (Oral)   Resp 15   Ht 5' 6" (1.676 m)   Wt 104.3 kg (230 lb)   SpO2 97%   Breastfeeding No   BMI 37.12 kg/m²     "

## 2024-10-02 NOTE — ANESTHESIA PREPROCEDURE EVALUATION
10/02/2024  Maureen Easley is a 64 y.o., female.      Pre-op Assessment    I have reviewed the Patient Summary Reports.     I have reviewed the Nursing Notes. I have reviewed the NPO Status.   I have reviewed the Medications.     Review of Systems  Anesthesia Hx:   History of prior surgery of interest to airway management or planning:          Denies Family Hx of Anesthesia complications.    Denies Personal Hx of Anesthesia complications.                    Cardiovascular:     Hypertension                                        Psych:  Psychiatric History                Physical Exam  General: Well nourished    Airway:  Mallampati: II   Mouth Opening: Normal  TM Distance: Normal, at least 6 cm  Tongue: Normal  Neck ROM: Normal ROM    Dental:  Intact      Anesthesia Plan  Type of Anesthesia, risks & benefits discussed:    Anesthesia Type: MAC  Intra-op Monitoring Plan: Standard ASA Monitors  Post Op Pain Control Plan: multimodal analgesia  Induction:  IV  Informed Consent: Informed consent signed with the Patient and all parties understand the risks and agree with anesthesia plan.  All questions answered. Patient consented to blood products? No  ASA Score: 3  Day of Surgery Review of History & Physical: H&P Update referred to the surgeon/provider.I have interviewed and examined the patient. I have reviewed the patient's H&P dated: There are no significant changes.     Ready For Surgery From Anesthesia Perspective.     .  Current Outpatient Medications on File Prior to Encounter   Medication Sig Dispense Refill    albuterol (PROVENTIL/VENTOLIN HFA) 90 mcg/actuation inhaler Inhale 2 puffs into the lungs every 4 (four) hours as needed.      ARIPiprazole (ABILIFY) 10 MG Tab Take 10 mg by mouth every morning.      aspirin (ECOTRIN) 81 MG EC tablet Take 81 mg by mouth nightly.      atorvastatin (LIPITOR) 10 MG  tablet Take 1 tablet (10 mg total) by mouth every evening. 90 tablet 1    DULoxetine (CYMBALTA) 30 MG capsule Take 30 mg by mouth once daily.      esomeprazole (NEXIUM) 40 MG capsule Take 1 capsule (40 mg total) by mouth before breakfast. 90 capsule 0    estradioL (ESTRACE) 0.01 % (0.1 mg/gram) vaginal cream Place 1 g vaginally twice a week. 42.5 g 11    fexofenadine (ALLEGRA ALLERGY) 180 MG tablet Take 1 tablet (180 mg total) by mouth once daily. 90 tablet 0    fluticasone propionate (FLONASE) 50 mcg/actuation nasal spray SPRAY 2 SPRAYS INTO EACH NOSTRIL EVERY DAY 48 mL 0    gabapentin (NEURONTIN) 300 MG capsule Take 1 capsule (300 mg total) by mouth 3 (three) times daily. 90 capsule 11    hydroCHLOROthiazide (HYDRODIURIL) 25 MG tablet Take 1 tablet (25 mg total) by mouth once daily. 90 tablet 0    HYDROcodone-acetaminophen (NORCO)  mg per tablet Take 1 tablet by mouth every 6 (six) hours as needed for pain... May cause drowsiness (Patient not taking: Reported on 7/23/2024) 28 tablet 0    HYDROcodone-acetaminophen (NORCO) 5-325 mg per tablet Take 1 tablet by mouth daily as needed.      lisinopriL (PRINIVIL,ZESTRIL) 20 MG tablet TAKE 1 TABLET ONE TIME DAILY 90 tablet 3    metroNIDAZOLE (METROGEL) 0.75 % gel Apply topically 2 (two) times daily. 45 g 5    mirtazapine (REMERON) 15 MG tablet Take 15 mg by mouth every evening.      montelukast (SINGULAIR) 10 mg tablet TAKE 1 TABLET EVERY EVENING 90 tablet 3    pramipexole (MIRAPEX) 0.5 MG tablet Take 1 tablet (0.5 mg total) by mouth every evening. 90 tablet 1    predniSONE (DELTASONE) 50 MG Tab Take 1 tablet (50 mg total) by mouth once daily. 6 tablet 0    traZODone (DESYREL) 100 MG tablet TAKE 1 TABLET BY MOUTH EVERY DAY AT BEDTIME AS DIRECTED       No current facility-administered medications on file prior to encounter.     Past Surgical History:   Procedure Laterality Date    COLONOSCOPY  08/21/2019    hx polyps    HYSTERECTOMY  1998    KNEE  ARTHROSCOPY W/ MENISCECTOMY Right 7/11/2024    Procedure: ARTHROSCOPY, KNEE, WITH PARTIAL MEDIAL AND LATERAL MENISCECTOMY;  Surgeon: Balaji Mcfarlane MD;  Location: [facility];  Service: Orthopedics;  Laterality: Right;      Review of patient's allergies indicates:   Allergen Reactions    Sulfamethoxazole-trimethoprim      Other reaction(s): Unknown    Clarithromycin Rash      Past Medical History:   Diagnosis Date    Bipolar disorder     Depression     Hyperlipidemia     Hypertension        Past Surgical History:   Procedure Laterality Date    COLONOSCOPY  08/21/2019    hx polyps    HYSTERECTOMY  1998    KNEE ARTHROSCOPY W/ MENISCECTOMY Right 7/11/2024    Procedure: ARTHROSCOPY, KNEE, WITH PARTIAL MEDIAL AND LATERAL MENISCECTOMY;  Surgeon: Balaji Mcfarlane MD;  Location: [facility];  Service: Orthopedics;  Laterality: Right;       Family History   Problem Relation Name Age of Onset    Hypertension Other      Breast cancer Neg Hx      Colon cancer Neg Hx      Ovarian cancer Neg Hx         Social History     Socioeconomic History    Marital status:    Tobacco Use    Smoking status: Never    Smokeless tobacco: Never   Substance and Sexual Activity    Alcohol use: Never    Drug use: Never    Sexual activity: Yes     Partners: Male     Birth control/protection: None     Social Drivers of Health     Financial Resource Strain: Low Risk  (4/17/2024)    Overall Financial Resource Strain (CARDIA)     Difficulty of Paying Living Expenses: Not hard at all   Food Insecurity: No Food Insecurity (4/17/2024)    Hunger Vital Sign     Worried About Running Out of Food in the Last Year: Never true     Ran Out of Food in the Last Year: Never true   Transportation Needs: No Transportation Needs (4/17/2024)    PRAPARE - Transportation     Lack of Transportation (Medical): No     Lack of Transportation (Non-Medical): No   Physical Activity: Insufficiently Active (4/17/2024)    Exercise Vital  Sign     Days of Exercise per Week: 1 day     Minutes of Exercise per Session: 10 min   Stress: Stress Concern Present (4/17/2024)    [employer] [place] [employer] - Occupational Stress Questionnaire     Feeling of Stress : Very much   Housing Stability: Unknown (4/17/2024)    Housing Stability Vital Sign     Unable to Pay for Housing in the Last Year: No       Current Outpatient Medications   Medication Sig Dispense Refill    albuterol (PROVENTIL/VENTOLIN HFA) 90 mcg/actuation inhaler Inhale 2 puffs into the lungs every 4 (four) hours as needed.      ARIPiprazole (ABILIFY) 10 MG Tab Take 10 mg by mouth every morning.      aspirin (ECOTRIN) 81 MG EC tablet Take 81 mg by mouth nightly.      atorvastatin (LIPITOR) 10 MG tablet Take 1 tablet (10 mg total) by mouth every evening. 90 tablet 1    DULoxetine (CYMBALTA) 30 MG capsule Take 30 mg by mouth once daily.      esomeprazole (NEXIUM) 40 MG capsule Take 1 capsule (40 mg total) by mouth before breakfast. 90 capsule 0    estradioL (ESTRACE) 0.01 % (0.1 mg/gram) vaginal cream Place 1 g vaginally twice a week. 42.5 g 11    fexofenadine (ALLEGRA ALLERGY) 180 MG tablet Take 1 tablet (180 mg total) by mouth once daily. 90 tablet 0    fluticasone propionate (FLONASE) 50 mcg/actuation nasal spray SPRAY 2 SPRAYS INTO EACH NOSTRIL EVERY DAY 48 mL 0    gabapentin (NEURONTIN) 300 MG capsule Take 1 capsule (300 mg total) by mouth 3 (three) times daily. 90 capsule 11    hydroCHLOROthiazide (HYDRODIURIL) 25 MG tablet Take 1 tablet (25 mg total) by mouth once daily. 90 tablet 0    HYDROcodone-acetaminophen (NORCO)  mg per tablet Take 1 tablet by mouth every 6 (six) hours as needed for pain... May cause drowsiness (Patient not taking: Reported on 7/23/2024) 28 tablet 0    HYDROcodone-acetaminophen (NORCO) 5-325 mg per tablet Take 1 tablet by mouth daily as needed.      lisinopriL (PRINIVIL,ZESTRIL) 20 MG tablet TAKE 1 TABLET ONE TIME DAILY 90 tablet  3    metroNIDAZOLE (METROGEL) 0.75 % gel Apply topically 2 (two) times daily. 45 g 5    mirtazapine (REMERON) 15 MG tablet Take 15 mg by mouth every evening.      montelukast (SINGULAIR) 10 mg tablet TAKE 1 TABLET EVERY EVENING 90 tablet 3    pramipexole (MIRAPEX) 0.5 MG tablet Take 1 tablet (0.5 mg total) by mouth every evening. 90 tablet 1    predniSONE (DELTASONE) 50 MG Tab Take 1 tablet (50 mg total) by mouth once daily. 6 tablet 0    traZODone (DESYREL) 100 MG tablet TAKE 1 TABLET BY MOUTH EVERY DAY AT BEDTIME AS DIRECTED       No current facility-administered medications for this encounter.       Review of patient's allergies indicates:   Allergen Reactions    Sulfamethoxazole-trimethoprim      Other reaction(s): Unknown    Clarithromycin Rash      Past Medical History:   Diagnosis Date    Bipolar disorder     Depression     Hyperlipidemia     Hypertension

## 2024-10-02 NOTE — ANESTHESIA POSTPROCEDURE EVALUATION
Anesthesia Post Evaluation    Patient: Maureen Easley    Procedure(s) Performed: * No procedures listed *    Final Anesthesia Type: MAC      Patient location during evaluation: GI PACU ([facility])  Patient participation: Yes- Able to Participate  Level of consciousness: awake and alert  Post-procedure vital signs: reviewed and stable  Pain management: adequate  Airway patency: patent    PONV status at discharge: No PONV  Anesthetic complications: no      Cardiovascular status: blood pressure returned to baseline, hemodynamically stable and stable  Respiratory status: unassisted  Hydration status: euvolemic  Follow-up not needed.  Comments: Refer to nursing note for pain/aileen score upon discharge from recovery.               Vitals Value Taken Time   /61 10/02/24 0940   Temp 36.6 °C (97.8 °F) 10/02/24 0921   Pulse 83 10/02/24 0942   Resp 17 10/02/24 0942   SpO2 99 % 10/02/24 0942   Vitals shown include unfiled device data.      No case tracking events are documented in the log.      Pain/Aileen Score: Aileen Score: 9 (10/2/2024  9:21 AM)

## 2024-10-02 NOTE — H&P
[facility]  Gastroenterology  H&P    Patient Name: Maureen Easley  MRN: [id number]  Admission Date: 10/2/2024  Code Status: Prior    Attending Provider: Mallika Pruitt FNP-C   Primary Care Physician: Beryl Longo DO  Principal Problem:<principal problem not specified>    Subjective:     History of Present Illness:  This patient is a 64-year-old female with family history of colon polyps.  Her last colonoscopy was 2019 with hyperplastic polyps removed.  She does have anemia.    Past Medical History:   Diagnosis Date    Bipolar disorder     Depression     Hyperlipidemia     Hypertension        Past Surgical History:   Procedure Laterality Date    COLONOSCOPY  08/21/2019    hx polyps    HYSTERECTOMY  1998    KNEE ARTHROSCOPY W/ MENISCECTOMY Right 7/11/2024    Procedure: ARTHROSCOPY, KNEE, WITH PARTIAL MEDIAL AND LATERAL MENISCECTOMY;  Surgeon: Balaji Mcfarlane MD;  Location: [facility];  Service: Orthopedics;  Laterality: Right;       Review of patient's allergies indicates:   Allergen Reactions    Sulfamethoxazole-trimethoprim      Other reaction(s): Unknown    Clarithromycin Rash     Family History       Problem Relation (Age of Onset)    Hypertension Other          Tobacco Use    Smoking status: Never    Smokeless tobacco: Never   Substance and Sexual Activity    Alcohol use: Never    Drug use: Never    Sexual activity: Yes     Partners: Male     Birth control/protection: None     Review of Systems   Respiratory: Negative.     Cardiovascular: Negative.    Gastrointestinal: Negative.      Objective:     Vital Signs (Most Recent):  Temp: 97.8 °F (36.6 °C) (10/02/24 0847)  Pulse: 95 (10/02/24 0847)  Resp: 19 (10/02/24 0847)  BP: (!) 131/57 (10/02/24 0847)  SpO2: 98 % (10/02/24 0847) Vital Signs (24h Range):  Temp:  [97.8 °F (36.6 °C)] 97.8 °F (36.6 °C)  Pulse:  [95] 95  Resp:  [19] 19  SpO2:  [98 %] 98 %  BP: (131)/(57) 131/57     Weight: 104.3 kg (230 lb) (10/02/24 0847)  Body mass index is  "37.12 kg/m².    No intake or output data in the 24 hours ending 10/02/24 0850    Lines/Drains/Airways       None                   Physical Exam  Vitals reviewed.   Constitutional:       General: She is not in acute distress.     Appearance: Normal appearance. She is well-developed. She is not ill-appearing.   HENT:      Head: Normocephalic and atraumatic.      Nose: Nose normal.   Eyes:      Pupils: Pupils are equal, round, and reactive to light.   Cardiovascular:      Rate and Rhythm: Normal rate and regular rhythm.   Pulmonary:      Effort: Pulmonary effort is normal.      Breath sounds: Normal breath sounds. No wheezing.   Abdominal:      General: Abdomen is flat. Bowel sounds are normal. There is no distension.      Palpations: Abdomen is soft.      Tenderness: There is no abdominal tenderness. There is no guarding.   Skin:     General: Skin is warm and dry.      Coloration: Skin is not jaundiced.   Neurological:      Mental Status: She is alert.   Psychiatric:         Attention and Perception: Attention normal.         Mood and Affect: Affect normal.         Speech: Speech normal.         Behavior: Behavior is cooperative.      Comments: Pt was calm while speaking.         Significant Labs:  CBC: No results for input(s): "WBC", "HGB", "HCT", "PLT" in the last 48 hours.  CMP: No results for input(s): "GLU", "CALCIUM", "ALBUMIN", "PROT", "NA", "K", "CO2", "CL", "BUN", "CREATININE", "ALKPHOS", "ALT", "AST", "BILITOT" in the last 48 hours.    Significant Imaging:  Imaging results within the past 24 hours have been reviewed.    Assessment/Plan:     There are no hospital problems to display for this patient.        Imp: family  history of colon polyps  Plan: colonoscopy    Ebenezer Suarez MD  Gastroenterology  [facility] - Endoscopy  "

## 2024-10-02 NOTE — DISCHARGE INSTRUCTIONS
Procedure Date  10/2/24     Impression  Overall Impression:   2 polyps in the descending colon were removed with cold forceps biopsy  Diverticulosis in the ascending colon, descending colon and sigmoid colon  Colon was examined to the cecum.  Diverticulosis was noted in the ascending descending and sigmoid colon.  Two diminutive polyps were removed with biopsy in the descending colon.  Impression: Screening for colon neoplasm, history of colon polyps, colon diverticulosis, 2 polyps were removed during this exam.  Recommendation  Await pathology results, due: 10/4/2024   Repeat colonoscopy in 5 years      Disposition: Discharged to home in stable condition, high-fiber diet, no driving times 24 hours.    No driving today, no operating heavy machinery, no signing any legal documents until tomorrow.    Drink lots of fluids, resume regular diet.  Take your normal medications.     Please call our office for any nausea, vomiting or abdominal pain.

## 2024-10-03 LAB
ESTROGEN SERPL-MCNC: NORMAL PG/ML
INSULIN SERPL-ACNC: NORMAL U[IU]/ML
LAB AP GROSS DESCRIPTION: NORMAL
LAB AP LABORATORY NOTES: NORMAL
T3RU NFR SERPL: NORMAL %

## 2024-10-04 ENCOUNTER — TELEPHONE (OUTPATIENT)
Dept: GASTROENTEROLOGY | Facility: CLINIC | Age: 64
End: 2024-10-04
Payer: MEDICARE

## 2024-10-04 NOTE — TELEPHONE ENCOUNTER
Results and recommendations called to patient. Verbalized understanding. 5 year recall placed on chart.            ----- Message from Ebenezer Suarez MD sent at 10/3/2024  5:11 PM CDT -----  One colon polyp was lymphoid and one was tubular adenoma. Recommend: repeat colonoscopy in 5 years.

## 2024-10-23 ENCOUNTER — OFFICE VISIT (OUTPATIENT)
Dept: FAMILY MEDICINE | Facility: CLINIC | Age: 64
End: 2024-10-23
Payer: MEDICARE

## 2024-10-23 VITALS
HEART RATE: 66 BPM | SYSTOLIC BLOOD PRESSURE: 116 MMHG | HEIGHT: 66 IN | DIASTOLIC BLOOD PRESSURE: 76 MMHG | OXYGEN SATURATION: 99 % | BODY MASS INDEX: 37.61 KG/M2 | WEIGHT: 234 LBS

## 2024-10-23 DIAGNOSIS — I10 PRIMARY HYPERTENSION: Primary | ICD-10-CM

## 2024-10-23 DIAGNOSIS — J30.9 ALLERGIC RHINITIS, UNSPECIFIED SEASONALITY, UNSPECIFIED TRIGGER: ICD-10-CM

## 2024-10-23 DIAGNOSIS — E78.5 HYPERLIPIDEMIA, UNSPECIFIED HYPERLIPIDEMIA TYPE: ICD-10-CM

## 2024-10-23 DIAGNOSIS — K21.9 GASTROESOPHAGEAL REFLUX DISEASE, UNSPECIFIED WHETHER ESOPHAGITIS PRESENT: ICD-10-CM

## 2024-10-23 PROCEDURE — 3044F HG A1C LEVEL LT 7.0%: CPT | Mod: ,,, | Performed by: FAMILY MEDICINE

## 2024-10-23 PROCEDURE — 3008F BODY MASS INDEX DOCD: CPT | Mod: ,,, | Performed by: FAMILY MEDICINE

## 2024-10-23 PROCEDURE — 3288F FALL RISK ASSESSMENT DOCD: CPT | Mod: ,,, | Performed by: FAMILY MEDICINE

## 2024-10-23 PROCEDURE — 3078F DIAST BP <80 MM HG: CPT | Mod: ,,, | Performed by: FAMILY MEDICINE

## 2024-10-23 PROCEDURE — 3074F SYST BP LT 130 MM HG: CPT | Mod: ,,, | Performed by: FAMILY MEDICINE

## 2024-10-23 PROCEDURE — 1159F MED LIST DOCD IN RCRD: CPT | Mod: ,,, | Performed by: FAMILY MEDICINE

## 2024-10-23 PROCEDURE — 99214 OFFICE O/P EST MOD 30 MIN: CPT | Mod: ,,, | Performed by: FAMILY MEDICINE

## 2024-10-23 PROCEDURE — 4010F ACE/ARB THERAPY RXD/TAKEN: CPT | Mod: ,,, | Performed by: FAMILY MEDICINE

## 2024-10-23 PROCEDURE — G2211 COMPLEX E/M VISIT ADD ON: HCPCS | Mod: ,,, | Performed by: FAMILY MEDICINE

## 2024-10-23 PROCEDURE — 1160F RVW MEDS BY RX/DR IN RCRD: CPT | Mod: ,,, | Performed by: FAMILY MEDICINE

## 2024-10-23 PROCEDURE — 1101F PT FALLS ASSESS-DOCD LE1/YR: CPT | Mod: ,,, | Performed by: FAMILY MEDICINE

## 2024-10-23 RX ORDER — HYDROCHLOROTHIAZIDE 25 MG/1
25 TABLET ORAL DAILY
Qty: 90 TABLET | Refills: 1 | Status: SHIPPED | OUTPATIENT
Start: 2024-10-23 | End: 2025-10-23

## 2024-10-23 RX ORDER — MINERAL OIL
180 ENEMA (ML) RECTAL DAILY
Qty: 90 TABLET | Refills: 1 | Status: SHIPPED | OUTPATIENT
Start: 2024-10-23 | End: 2025-10-23

## 2024-10-23 RX ORDER — ATORVASTATIN CALCIUM 10 MG/1
10 TABLET, FILM COATED ORAL NIGHTLY
Qty: 90 TABLET | Refills: 1 | Status: SHIPPED | OUTPATIENT
Start: 2024-10-23

## 2024-10-23 RX ORDER — MONTELUKAST SODIUM 10 MG/1
10 TABLET ORAL NIGHTLY
Qty: 90 TABLET | Refills: 1 | Status: SHIPPED | OUTPATIENT
Start: 2024-10-23

## 2024-10-23 RX ORDER — GABAPENTIN 300 MG/1
300 CAPSULE ORAL 3 TIMES DAILY
Qty: 90 CAPSULE | Refills: 11 | Status: SHIPPED | OUTPATIENT
Start: 2024-10-23 | End: 2025-10-23

## 2024-10-23 RX ORDER — LISINOPRIL 20 MG/1
20 TABLET ORAL DAILY
Qty: 90 TABLET | Refills: 1 | Status: SHIPPED | OUTPATIENT
Start: 2024-10-23

## 2024-10-23 RX ORDER — ESOMEPRAZOLE MAGNESIUM 40 MG/1
40 CAPSULE, DELAYED RELEASE ORAL
Qty: 90 CAPSULE | Refills: 0 | Status: SHIPPED | OUTPATIENT
Start: 2024-10-23 | End: 2025-04-21

## 2024-10-23 RX ORDER — PRAMIPEXOLE DIHYDROCHLORIDE 0.5 MG/1
0.5 TABLET ORAL NIGHTLY
Qty: 90 TABLET | Refills: 1 | Status: SHIPPED | OUTPATIENT
Start: 2024-10-23

## 2024-10-29 DIAGNOSIS — L71.9 ROSACEA: ICD-10-CM

## 2024-10-29 RX ORDER — METRONIDAZOLE 7.5 MG/G
GEL TOPICAL
Qty: 45 G | Refills: 5 | Status: SHIPPED | OUTPATIENT
Start: 2024-10-29

## 2024-12-06 DIAGNOSIS — M79.642 BILATERAL HAND PAIN: Primary | ICD-10-CM

## 2024-12-06 DIAGNOSIS — M79.641 BILATERAL HAND PAIN: Primary | ICD-10-CM

## 2024-12-09 ENCOUNTER — HOSPITAL ENCOUNTER (OUTPATIENT)
Dept: RADIOLOGY | Facility: HOSPITAL | Age: 64
Discharge: HOME | End: 2024-12-09
Payer: MEDICARE

## 2024-12-09 ENCOUNTER — OFFICE VISIT (OUTPATIENT)
Dept: ORTHOPEDICS | Facility: CLINIC | Age: 64
End: 2024-12-09
Payer: MEDICARE

## 2024-12-09 VITALS — HEIGHT: 66 IN | BODY MASS INDEX: 37.6 KG/M2 | WEIGHT: 233.94 LBS

## 2024-12-09 DIAGNOSIS — M79.642 BILATERAL HAND PAIN: ICD-10-CM

## 2024-12-09 DIAGNOSIS — M65.319 TRIGGER FINGER OF THUMB, UNSPECIFIED LATERALITY: Primary | ICD-10-CM

## 2024-12-09 DIAGNOSIS — M79.641 BILATERAL HAND PAIN: ICD-10-CM

## 2024-12-09 PROCEDURE — 3008F BODY MASS INDEX DOCD: CPT | Mod: CPTII,,,

## 2024-12-09 PROCEDURE — 99999PBSHW PR PBB SHADOW TECHNICAL ONLY FILED TO HB: Mod: PBBFAC,,,

## 2024-12-09 PROCEDURE — 99212 OFFICE O/P EST SF 10 MIN: CPT | Mod: PBBFAC,25

## 2024-12-09 PROCEDURE — 73130 X-RAY EXAM OF HAND: CPT | Mod: 26,50,, | Performed by: RADIOLOGY

## 2024-12-09 PROCEDURE — 73130 X-RAY EXAM OF HAND: CPT | Mod: TC,50

## 2024-12-09 PROCEDURE — 20550 NJX 1 TENDON SHEATH/LIGAMENT: CPT | Mod: PBBFAC,RT

## 2024-12-09 PROCEDURE — 99999 PR PBB SHADOW E&M-EST. PATIENT-LVL II: CPT | Mod: PBBFAC,,,

## 2024-12-09 PROCEDURE — 4010F ACE/ARB THERAPY RXD/TAKEN: CPT | Mod: CPTII,,,

## 2024-12-09 PROCEDURE — 3044F HG A1C LEVEL LT 7.0%: CPT | Mod: CPTII,,,

## 2024-12-09 PROCEDURE — 99213 OFFICE O/P EST LOW 20 MIN: CPT | Mod: S$PBB,25,,

## 2024-12-09 RX ORDER — BUPIVACAINE HYDROCHLORIDE 2.5 MG/ML
25 INJECTION, SOLUTION EPIDURAL; INFILTRATION; INTRACAUDAL
Status: DISCONTINUED | OUTPATIENT
Start: 2024-12-09 | End: 2024-12-09 | Stop reason: HOSPADM

## 2024-12-09 RX ORDER — TRIAMCINOLONE ACETONIDE 40 MG/ML
1 INJECTION, SUSPENSION INTRA-ARTICULAR; INTRAMUSCULAR
Status: DISCONTINUED | OUTPATIENT
Start: 2024-12-09 | End: 2024-12-09 | Stop reason: HOSPADM

## 2024-12-09 RX ADMIN — TRIAMCINOLONE ACETONIDE 1 ML: 40 INJECTION, SUSPENSION INTRA-ARTICULAR; INTRAMUSCULAR at 10:12

## 2024-12-09 RX ADMIN — BUPIVACAINE HYDROCHLORIDE 25 MG: 2.5 INJECTION, SOLUTION EPIDURAL; INFILTRATION; INTRACAUDAL at 10:12

## 2024-12-09 NOTE — PROGRESS NOTES
CC:   Chief Complaint   Patient presents with    Injections     Patient was seen for bilateral trigger fingers and was given injections today      HPI     Injections     Additional comments: Patient was seen for bilateral trigger fingers and   was given injections today          Last edited by Marylou Robles CMA on 12/9/2024 12:04 PM.        Maureen Easley is a 64 y.o. female seen today for Injections (Patient was seen for bilateral trigger fingers and was given injections today)  Patient presents today with pain in bilateral thumbs.  She reports that over Thanksgiving she noticed her thumbs were locking whenever she bent her thumbs.  She reports pain and tenderness in both thumbs.  She denies any fall or injury the onset of her symptoms.  No other complaints today.        PAST MEDICAL HISTORY:   Past Medical History:   Diagnosis Date    Bipolar disorder     Depression     Hyperlipidemia     Hypertension           PAST SURGICAL HISTORY:   Past Surgical History:   Procedure Laterality Date    COLONOSCOPY  08/21/2019    hx polyps    HYSTERECTOMY  1998    KNEE ARTHROSCOPY W/ MENISCECTOMY Right 7/11/2024    Procedure: ARTHROSCOPY, KNEE, WITH PARTIAL MEDIAL AND LATERAL MENISCECTOMY;  Surgeon: Balaji Mcfarlane MD;  Location: [facility];  Service: Orthopedics;  Laterality: Right;          ALLERGIES:   Review of patient's allergies indicates:   Allergen Reactions    Sulfamethoxazole-trimethoprim      Other reaction(s): Unknown    Clarithromycin Rash        MEDICATIONS :    Current Outpatient Medications:     albuterol (PROVENTIL/VENTOLIN HFA) 90 mcg/actuation inhaler, Inhale 2 puffs into the lungs every 4 (four) hours as needed., Disp: , Rfl:     ARIPiprazole (ABILIFY) 10 MG Tab, Take 10 mg by mouth every morning., Disp: , Rfl:     aspirin (ECOTRIN) 81 MG EC tablet, Take 81 mg by mouth nightly., Disp: , Rfl:     atorvastatin (LIPITOR) 10 MG tablet, Take 1 tablet (10 mg total) by mouth every  evening., Disp: 90 tablet, Rfl: 1    DULoxetine (CYMBALTA) 30 MG capsule, Take 30 mg by mouth once daily., Disp: , Rfl:     esomeprazole (NEXIUM) 40 MG capsule, Take 1 capsule (40 mg total) by mouth before breakfast., Disp: 90 capsule, Rfl: 0    estradioL (ESTRACE) 0.01 % (0.1 mg/gram) vaginal cream, Place 1 g vaginally twice a week., Disp: 42.5 g, Rfl: 11    fexofenadine (ALLEGRA ALLERGY) 180 MG tablet, Take 1 tablet (180 mg total) by mouth once daily., Disp: 90 tablet, Rfl: 1    fluticasone propionate (FLONASE) 50 mcg/actuation nasal spray, SPRAY 2 SPRAYS INTO EACH NOSTRIL EVERY DAY, Disp: 48 mL, Rfl: 0    gabapentin (NEURONTIN) 300 MG capsule, Take 1 capsule (300 mg total) by mouth 3 (three) times daily., Disp: 90 capsule, Rfl: 11    hydroCHLOROthiazide (HYDRODIURIL) 25 MG tablet, Take 1 tablet (25 mg total) by mouth once daily., Disp: 90 tablet, Rfl: 1    HYDROcodone-acetaminophen (NORCO)  mg per tablet, Take 1 tablet by mouth every 6 (six) hours as needed for pain... May cause drowsiness (Patient not taking: Reported on 10/23/2024), Disp: 28 tablet, Rfl: 0    HYDROcodone-acetaminophen (NORCO) 5-325 mg per tablet, Take 1 tablet by mouth daily as needed., Disp: , Rfl:     lisinopriL (PRINIVIL,ZESTRIL) 20 MG tablet, Take 1 tablet (20 mg total) by mouth once daily., Disp: 90 tablet, Rfl: 1    metroNIDAZOLE (METROGEL) 0.75 % gel, APPLY TO AFFECTED AREAS OF THE SKIN TWICE DAILY, Disp: 45 g, Rfl: 5    mirtazapine (REMERON) 15 MG tablet, Take 15 mg by mouth every evening., Disp: , Rfl:     montelukast (SINGULAIR) 10 mg tablet, Take 1 tablet (10 mg total) by mouth every evening., Disp: 90 tablet, Rfl: 1    pramipexole (MIRAPEX) 0.5 MG tablet, Take 1 tablet (0.5 mg total) by mouth every evening., Disp: 90 tablet, Rfl: 1    predniSONE (DELTASONE) 50 MG Tab, Take 1 tablet (50 mg total) by mouth once daily., Disp: 6 tablet, Rfl: 0    traZODone (DESYREL) 100 MG tablet, TAKE 1 TABLET BY MOUTH EVERY DAY AT BEDTIME AS  DIRECTED, Disp: , Rfl:      SOCIAL HISTORY:   Social History     Socioeconomic History    Marital status:    Tobacco Use    Smoking status: Never    Smokeless tobacco: Never   Substance and Sexual Activity    Alcohol use: Never    Drug use: Never    Sexual activity: Yes     Partners: Male     Birth control/protection: None     Social Drivers of Health     Financial Resource Strain: Low Risk  (4/17/2024)    Overall Financial Resource Strain (CARDIA)     Difficulty of Paying Living Expenses: Not hard at all   Food Insecurity: No Food Insecurity (4/17/2024)    Hunger Vital Sign     Worried About Running Out of Food in the Last Year: Never true     Ran Out of Food in the Last Year: Never true   Transportation Needs: No Transportation Needs (4/17/2024)    PRAPARE - Transportation     Lack of Transportation (Medical): No     Lack of Transportation (Non-Medical): No   Physical Activity: Insufficiently Active (4/17/2024)    Exercise Vital Sign     Days of Exercise per Week: 1 day     Minutes of Exercise per Session: 10 min   Stress: Stress Concern Present (4/17/2024)    [employer] [place] [employer] - Occupational Stress Questionnaire     Feeling of Stress : Very much   Housing Stability: Unknown (4/17/2024)    Housing Stability Vital Sign     Unable to Pay for Housing in the Last Year: No        FAMILY HISTORY:   Family History   Problem Relation Name Age of Onset    Hypertension Other      Breast cancer Neg Hx      Colon cancer Neg Hx      Ovarian cancer Neg Hx            PHYSICAL EXAM:      There were no vitals filed for this visit.  Body mass index is 37.75 kg/m².    GENERAL: Well-developed, well-nourished female . The patient is alert, oriented and cooperative.    HEENT:  Normocephalic, atraumatic.  Extraocular movements are intact bilaterally.     NECK:  Nontender with good range of motion.    LUNGS:  Clear to auscultation bilaterally.    HEART:  Regular rate and rhythm.     ABDOMEN:  Soft,  non-tender, non-distended.      EXTREMITIES:  Bilateral thumbs was skin clean dry and intact, tenderness to palpation over A1 pulley bilaterally, there is locking in a flexed position without inability to actively extend her thumbs, neurovascularly intact      RADIOGRAPHIC FINDINGS:   No results found.     Patient Active Problem List    Diagnosis Date Noted    Screening for colon cancer 10/02/2024    History of colon polyps 10/02/2024    Colon, diverticulosis 10/02/2024    Adenomatous polyp of descending colon 10/02/2024    Acute medial meniscus tear, right, initial encounter 06/26/2024    Chronic pain of right knee 04/25/2024    Severe obesity (BMI 35.0-39.9) with comorbidity 09/18/2023    Essential hypertension 05/24/2023    Hyperlipidemia 05/24/2023    Type 2 macular telangiectasis, bilateral 08/13/2018     IMPRESSION AND PLAN:  Trigger finger bilateral thumbs.  Discussed all treatment options with the patient today.  Bilateral injections given, see procedural note for details.  Discussed with the patient that these can be repeated every 3-4 months as needed. Follow up with Ortho PRN.    No follow-ups on file.       Kalina Stroud PA-C      (Subject to voice recognition error, transcription service not allowed)

## 2024-12-09 NOTE — PROCEDURES
Tendon Sheath    Date/Time: 12/9/2024 10:20 AM    Performed by: Kalina Stroud PA  Authorized by: Kalina Stroud PA    Consent Done?:  Yes (Verbal)  Indications:  Pain  Location:  Thumb  Site:  R thumb flexor tendon sheath and L thumb flexor tendon sheath  Needle size:  25 G  Approach:  Volar  Medications:  25 mg BUPivacaine (PF) 0.25% (2.5 mg/ml) 0.25 % (2.5 mg/mL); 1 mL triamcinolone acetonide 40 mg/mL  Patient tolerance:  Patient tolerated the procedure well with no immediate complications

## 2024-12-18 NOTE — PROGRESS NOTES
[facility]    Chief Complaint      Chief Complaint   Patient presents with    Medication Refill       History of Present Illness      Maureen Easley is a 64 y.o. female that  has a past medical history of Bipolar disorder, Depression, Hyperlipidemia, and Hypertension.     HPI    The patient presents today for medication refills for hypertension.She has no complaints        Past Medical History:  Past Medical History:   Diagnosis Date    Bipolar disorder     Depression     Hyperlipidemia     Hypertension        Past Surgical History:   has a past surgical history that includes Colonoscopy (08/21/2019); Hysterectomy (1998); and Knee arthroscopy w/ meniscectomy (Right, 7/11/2024).    Social History:  Social History     Tobacco Use    Smoking status: Never    Smokeless tobacco: Never   Substance Use Topics    Alcohol use: Never    Drug use: Never       I personally reviewed all past medical, surgical, and social.     Review of Systems   Constitutional:  Negative for chills and fever.   HENT:  Negative for ear pain and sore throat.    Eyes:  Negative for blurred vision.   Respiratory:  Negative for cough and shortness of breath.    Cardiovascular:  Negative for chest pain and palpitations.   Gastrointestinal:  Negative for abdominal pain and constipation.   Genitourinary:  Negative for dysuria and hematuria.   Musculoskeletal:  Negative for back pain and falls.   Skin:  Negative for itching and rash.   Neurological:  Negative for weakness and headaches.   Endo/Heme/Allergies:  Negative for polydipsia. Does not bruise/bleed easily.   Psychiatric/Behavioral:  Negative for suicidal ideas. The patient does not have insomnia.         Medications:  Outpatient Encounter Medications as of 10/23/2024   Medication Sig Dispense Refill    albuterol (PROVENTIL/VENTOLIN HFA) 90 mcg/actuation inhaler Inhale 2 puffs into the lungs every 4 (four) hours as needed.      ARIPiprazole (ABILIFY) 10 MG Tab Take 10 mg by mouth every  morning.      aspirin (ECOTRIN) 81 MG EC tablet Take 81 mg by mouth nightly.      DULoxetine (CYMBALTA) 30 MG capsule Take 30 mg by mouth once daily.      estradioL (ESTRACE) 0.01 % (0.1 mg/gram) vaginal cream Place 1 g vaginally twice a week. 42.5 g 11    fluticasone propionate (FLONASE) 50 mcg/actuation nasal spray SPRAY 2 SPRAYS INTO EACH NOSTRIL EVERY DAY 48 mL 0    HYDROcodone-acetaminophen (NORCO) 5-325 mg per tablet Take 1 tablet by mouth daily as needed.      mirtazapine (REMERON) 15 MG tablet Take 15 mg by mouth every evening.      predniSONE (DELTASONE) 50 MG Tab Take 1 tablet (50 mg total) by mouth once daily. 6 tablet 0    traZODone (DESYREL) 100 MG tablet TAKE 1 TABLET BY MOUTH EVERY DAY AT BEDTIME AS DIRECTED      [DISCONTINUED] atorvastatin (LIPITOR) 10 MG tablet Take 1 tablet (10 mg total) by mouth every evening. 90 tablet 1    [DISCONTINUED] fexofenadine (ALLEGRA ALLERGY) 180 MG tablet Take 1 tablet (180 mg total) by mouth once daily. 90 tablet 0    [DISCONTINUED] hydroCHLOROthiazide (HYDRODIURIL) 25 MG tablet Take 1 tablet (25 mg total) by mouth once daily. 90 tablet 0    [DISCONTINUED] lisinopriL (PRINIVIL,ZESTRIL) 20 MG tablet TAKE 1 TABLET ONE TIME DAILY 90 tablet 3    [DISCONTINUED] metroNIDAZOLE (METROGEL) 0.75 % gel Apply topically 2 (two) times daily. 45 g 5    [DISCONTINUED] montelukast (SINGULAIR) 10 mg tablet TAKE 1 TABLET EVERY EVENING 90 tablet 3    [DISCONTINUED] pramipexole (MIRAPEX) 0.5 MG tablet Take 1 tablet (0.5 mg total) by mouth every evening. 90 tablet 1    atorvastatin (LIPITOR) 10 MG tablet Take 1 tablet (10 mg total) by mouth every evening. 90 tablet 1    esomeprazole (NEXIUM) 40 MG capsule Take 1 capsule (40 mg total) by mouth before breakfast. 90 capsule 0    fexofenadine (ALLEGRA ALLERGY) 180 MG tablet Take 1 tablet (180 mg total) by mouth once daily. 90 tablet 1    gabapentin (NEURONTIN) 300 MG capsule Take 1 capsule (300 mg total) by mouth 3 (three) times daily. 90  capsule 11    hydroCHLOROthiazide (HYDRODIURIL) 25 MG tablet Take 1 tablet (25 mg total) by mouth once daily. 90 tablet 1    HYDROcodone-acetaminophen (NORCO)  mg per tablet Take 1 tablet by mouth every 6 (six) hours as needed for pain... May cause drowsiness (Patient not taking: Reported on 10/23/2024) 28 tablet 0    lisinopriL (PRINIVIL,ZESTRIL) 20 MG tablet Take 1 tablet (20 mg total) by mouth once daily. 90 tablet 1    montelukast (SINGULAIR) 10 mg tablet Take 1 tablet (10 mg total) by mouth every evening. 90 tablet 1    pramipexole (MIRAPEX) 0.5 MG tablet Take 1 tablet (0.5 mg total) by mouth every evening. 90 tablet 1    [DISCONTINUED] esomeprazole (NEXIUM) 40 MG capsule Take 1 capsule (40 mg total) by mouth before breakfast. 90 capsule 0    [DISCONTINUED] gabapentin (NEURONTIN) 300 MG capsule Take 1 capsule (300 mg total) by mouth 3 (three) times daily. 90 capsule 11     No facility-administered encounter medications on file as of 10/23/2024.       Allergies:  Review of patient's allergies indicates:   Allergen Reactions    Sulfamethoxazole-trimethoprim      Other reaction(s): Unknown    Clarithromycin Rash       Health Maintenance:  Immunization History   Administered Date(s) Administered    COVID-19 MRNA, LN-S PF (MODERNA HALF 0.25 ML DOSE) 11/23/2021    COVID-19 Vaccine 06/21/2022    COVID-19, MRNA, LN-S, PF (Pfizer) (Purple Cap) 04/05/2021, 04/30/2021    Influenza 10/22/2022    Influenza - Quadrivalent - PF *Preferred* (6 months and older) 10/19/2021      Health Maintenance   Topic Date Due    TETANUS VACCINE  Never done    DEXA Scan  Never done    Shingles Vaccine (1 of 2) Never done    RSV Vaccine (Age 60+ and Pregnant patients) (1 - Risk 60-74 years 1-dose series) Never done    Pneumococcal Vaccines (Age 65+) (1 of 1 - PCV) Never done    COVID-19 Vaccine (5 - 2024-25 season) 09/01/2024    Mammogram  11/20/2024    Hemoglobin A1c (Diabetic Prevention Screening)  04/03/2027    Lipid Panel   "04/03/2029    Colorectal Cancer Screening  10/02/2029    Hepatitis C Screening  Completed    Influenza Vaccine  Completed    HIV Screening  Completed        Physical Exam      Vital Signs  Pulse: 66  SpO2: 99 %  BP: 116/76  BP Location: Right arm  Patient Position: Sitting  Height and Weight  Height: 5' 6" (167.6 cm)  Weight: 106.1 kg (234 lb)  BSA (Calculated - sq m): 2.22 sq meters  BMI (Calculated): 37.8  Weight in (lb) to have BMI = 25: 154.6]    Physical Exam  Vitals and nursing note reviewed.   Constitutional:       Appearance: Normal appearance.   HENT:      Head: Normocephalic and atraumatic.      Nose: Nose normal.   Eyes:      Extraocular Movements: Extraocular movements intact.      Conjunctiva/sclera: Conjunctivae normal.      Pupils: Pupils are equal, round, and reactive to light.   Cardiovascular:      Rate and Rhythm: Normal rate and regular rhythm.      Heart sounds: Normal heart sounds.   Pulmonary:      Effort: Pulmonary effort is normal.      Breath sounds: Normal breath sounds.   Musculoskeletal:         General: No signs of injury.      Right lower leg: No edema.      Left lower leg: No edema.   Skin:     Findings: No rash.   Neurological:      General: No focal deficit present.      Mental Status: She is alert and oriented to person, place, and time. Mental status is at baseline.      Cranial Nerves: No cranial nerve deficit.      Gait: Gait normal.   Psychiatric:         Mood and Affect: Mood normal.         Behavior: Behavior normal.         Thought Content: Thought content normal.         Judgment: Judgment normal.          Laboratory:  CBC:  Recent Labs   Lab 03/24/24  0910 06/26/24  1155 08/24/24  1637   WBC 7.37 10.22 9.09   RBC 4.37 4.59 4.13 L   Hemoglobin 11.8 L 12.3 11.1 L   Hematocrit 36.9 L 39.2 36.0 L   Platelet Count 245 266 250   MCV 84.4 85.4 87.2   MCH 27.0 26.8 L 26.9 L   MCHC 32.0 31.4 L 30.8 L     CMP:  Recent Labs   Lab 11/11/22  1437 05/24/23  1740 09/18/23  1125 " 03/24/24  0910 08/24/24  1637   Glucose 111 H   < > 101   < > 115 H   Calcium 9.1   < > 9.0   < > 9.3   Albumin 3.5  --  3.5  --  3.5   Total Protein 6.9  --  6.7  --  6.9   Sodium 140   < > 143   < > 136   Potassium 4.1   < > 4.2   < > 4.2   CO2 30   < > 30   < > 31   Chloride 105   < > 104   < > 102   BUN 18   < > 23 H   < > 26 H   Alk Phos 72  --  84  --  75   ALT 23  --  24  --  23   AST 19  --  20  --  22   Bilirubin, Total 0.3  --  0.5  --  0.2    < > = values in this interval not displayed.     LIPIDS:  Recent Labs   Lab 05/20/22  0941 04/03/24  1214   TSH  --  1.720   HDL Cholesterol 64 H 56   Cholesterol 196 208 H   Triglycerides 178 H 222 H   LDL Calculated 96 108   Cholesterol/HDL Ratio (Risk Factor) 3.1 3.7   Non- 152     TSH:  Recent Labs   Lab 04/03/24  1214   TSH 1.720     A1C:  Recent Labs   Lab 04/03/24  1214   Hemoglobin A1C 6.1       Assessment/Plan     Maureen Easley is a 64 y.o.female with:    1. Primary hypertension  -     hydroCHLOROthiazide (HYDRODIURIL) 25 MG tablet; Take 1 tablet (25 mg total) by mouth once daily.  Dispense: 90 tablet; Refill: 1  -     lisinopriL (PRINIVIL,ZESTRIL) 20 MG tablet; Take 1 tablet (20 mg total) by mouth once daily.  Dispense: 90 tablet; Refill: 1    2. Hyperlipidemia, unspecified hyperlipidemia type  Overview:  Goals:  LDL <120 mg/dL, HDL > 40 mg/dL, triglyceride <150 mg/dL  Lipitor 10 mg nightly    Orders:  -     atorvastatin (LIPITOR) 10 MG tablet; Take 1 tablet (10 mg total) by mouth every evening.  Dispense: 90 tablet; Refill: 1    3. Gastroesophageal reflux disease, unspecified whether esophagitis present  -     esomeprazole (NEXIUM) 40 MG capsule; Take 1 capsule (40 mg total) by mouth before breakfast.  Dispense: 90 capsule; Refill: 0    4. Allergic rhinitis, unspecified seasonality, unspecified trigger  -     fexofenadine (ALLEGRA ALLERGY) 180 MG tablet; Take 1 tablet (180 mg total) by mouth once daily.  Dispense: 90 tablet; Refill: 1    Other  orders  -     gabapentin (NEURONTIN) 300 MG capsule; Take 1 capsule (300 mg total) by mouth 3 (three) times daily.  Dispense: 90 capsule; Refill: 11  -     montelukast (SINGULAIR) 10 mg tablet; Take 1 tablet (10 mg total) by mouth every evening.  Dispense: 90 tablet; Refill: 1  -     pramipexole (MIRAPEX) 0.5 MG tablet; Take 1 tablet (0.5 mg total) by mouth every evening.  Dispense: 90 tablet; Refill: 1        Visit today included increased complexity associated with managing the longitudinal care of the patient due to the serious and/or complex managed problem(s) of  hypertension and hyperlipidemia.     Chronic conditions status updated as per HPI.  Other than changes above, cont current medications and maintain follow up with specialists.  Return to clinic prn if symptoms worsen or fail to improve.    Beryl Longo DO  [facility]

## 2025-01-08 ENCOUNTER — OFFICE VISIT (OUTPATIENT)
Dept: DERMATOLOGY | Facility: CLINIC | Age: 65
End: 2025-01-08
Payer: MEDICARE

## 2025-01-08 DIAGNOSIS — L71.9 ROSACEA: ICD-10-CM

## 2025-01-08 PROCEDURE — 99213 OFFICE O/P EST LOW 20 MIN: CPT | Mod: ,,, | Performed by: DERMATOLOGY

## 2025-01-08 PROCEDURE — 1160F RVW MEDS BY RX/DR IN RCRD: CPT | Mod: CPTII,,, | Performed by: DERMATOLOGY

## 2025-01-08 PROCEDURE — 1159F MED LIST DOCD IN RCRD: CPT | Mod: CPTII,,, | Performed by: DERMATOLOGY

## 2025-01-08 RX ORDER — METRONIDAZOLE 7.5 MG/G
GEL TOPICAL
Qty: 45 G | Refills: 5 | Status: SHIPPED | OUTPATIENT
Start: 2025-01-08

## 2025-01-08 NOTE — PROGRESS NOTES
[facility]   Za Moffett MD    Patient Name: Maureen Easley  Patient YOB: 1960   Date of Service: 1/8/25    CC: Follow-up Rosacea    HPI: Maureen Easley is a 64 y.o. female here today for follow-up of rosacea, last seen 1/8/2024.  Previous treatments include Metrogel.  Overall, the rosacea is stable.  Treatment plan was followed as directed.    Past Medical History:   Diagnosis Date    Bipolar disorder     Depression     Hyperlipidemia     Hypertension      Past Surgical History:   Procedure Laterality Date    COLONOSCOPY  08/21/2019    hx polyps    HYSTERECTOMY  1998    KNEE ARTHROSCOPY W/ MENISCECTOMY Right 7/11/2024    Procedure: ARTHROSCOPY, KNEE, WITH PARTIAL MEDIAL AND LATERAL MENISCECTOMY;  Surgeon: Balaji Mcfarlane MD;  Location: [facility];  Service: Orthopedics;  Laterality: Right;     Review of patient's allergies indicates:   Allergen Reactions    Sulfamethoxazole-trimethoprim      Other reaction(s): Unknown    Clarithromycin Rash       Current Outpatient Medications:     albuterol (PROVENTIL/VENTOLIN HFA) 90 mcg/actuation inhaler, Inhale 2 puffs into the lungs every 4 (four) hours as needed., Disp: , Rfl:     ARIPiprazole (ABILIFY) 10 MG Tab, Take 10 mg by mouth every morning., Disp: , Rfl:     aspirin (ECOTRIN) 81 MG EC tablet, Take 81 mg by mouth nightly., Disp: , Rfl:     atorvastatin (LIPITOR) 10 MG tablet, Take 1 tablet (10 mg total) by mouth every evening., Disp: 90 tablet, Rfl: 1    DULoxetine (CYMBALTA) 30 MG capsule, Take 30 mg by mouth once daily., Disp: , Rfl:     esomeprazole (NEXIUM) 40 MG capsule, Take 1 capsule (40 mg total) by mouth before breakfast., Disp: 90 capsule, Rfl: 0    estradioL (ESTRACE) 0.01 % (0.1 mg/gram) vaginal cream, Place 1 g vaginally twice a week., Disp: 42.5 g, Rfl: 11    fexofenadine (ALLEGRA ALLERGY) 180 MG tablet, Take 1 tablet (180 mg total) by mouth once daily., Disp: 90 tablet, Rfl: 1    fluticasone propionate (FLONASE) 50  mcg/actuation nasal spray, SPRAY 2 SPRAYS INTO EACH NOSTRIL EVERY DAY, Disp: 48 mL, Rfl: 0    gabapentin (NEURONTIN) 300 MG capsule, Take 1 capsule (300 mg total) by mouth 3 (three) times daily., Disp: 90 capsule, Rfl: 11    hydroCHLOROthiazide (HYDRODIURIL) 25 MG tablet, Take 1 tablet (25 mg total) by mouth once daily., Disp: 90 tablet, Rfl: 1    HYDROcodone-acetaminophen (NORCO) 5-325 mg per tablet, Take 1 tablet by mouth daily as needed., Disp: , Rfl:     lisinopriL (PRINIVIL,ZESTRIL) 20 MG tablet, Take 1 tablet (20 mg total) by mouth once daily., Disp: 90 tablet, Rfl: 1    metroNIDAZOLE (METROGEL) 0.75 % gel, Apply to face daily, Disp: 45 g, Rfl: 5    mirtazapine (REMERON) 15 MG tablet, Take 15 mg by mouth every evening., Disp: , Rfl:     montelukast (SINGULAIR) 10 mg tablet, Take 1 tablet (10 mg total) by mouth every evening., Disp: 90 tablet, Rfl: 1    pramipexole (MIRAPEX) 0.5 MG tablet, Take 1 tablet (0.5 mg total) by mouth every evening., Disp: 90 tablet, Rfl: 1    traZODone (DESYREL) 100 MG tablet, TAKE 1 TABLET BY MOUTH EVERY DAY AT BEDTIME AS DIRECTED, Disp: , Rfl:     ROS: A focused review of systems was obtained and negative.     Exam: A focused skin exam was performed. All areas examined were normal except as mentioned in the assessment and plan below.  General Appearance of the patient is well developed and well nourished.  Orientation: alert and oriented x 3.  Mood and affect: pleasant.    Assessment:   The encounter diagnosis was Rosacea.    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  I counseled the patient regarding the following:  Contact office if: Rosacea worsens or fails to improve despite months of treatment; patient develops nodules or  cysts.    - Will refill MetroGel    Follow up in about 1  year (around 1/8/2026).    Za Moffett MD

## 2025-01-09 ENCOUNTER — OFFICE VISIT (OUTPATIENT)
Dept: CARDIOLOGY | Facility: CLINIC | Age: 65
End: 2025-01-09
Payer: MEDICARE

## 2025-01-09 VITALS
BODY MASS INDEX: 38.12 KG/M2 | HEART RATE: 86 BPM | OXYGEN SATURATION: 98 % | DIASTOLIC BLOOD PRESSURE: 80 MMHG | HEIGHT: 66 IN | WEIGHT: 237.19 LBS | SYSTOLIC BLOOD PRESSURE: 132 MMHG

## 2025-01-09 DIAGNOSIS — R06.2 WHEEZING: ICD-10-CM

## 2025-01-09 DIAGNOSIS — R06.02 SHORTNESS OF BREATH: Primary | ICD-10-CM

## 2025-01-09 PROCEDURE — 99215 OFFICE O/P EST HI 40 MIN: CPT | Mod: PBBFAC | Performed by: STUDENT IN AN ORGANIZED HEALTH CARE EDUCATION/TRAINING PROGRAM

## 2025-01-09 PROCEDURE — 1126F AMNT PAIN NOTED NONE PRSNT: CPT | Mod: CPTII,,, | Performed by: STUDENT IN AN ORGANIZED HEALTH CARE EDUCATION/TRAINING PROGRAM

## 2025-01-09 PROCEDURE — 1101F PT FALLS ASSESS-DOCD LE1/YR: CPT | Mod: CPTII,,, | Performed by: STUDENT IN AN ORGANIZED HEALTH CARE EDUCATION/TRAINING PROGRAM

## 2025-01-09 PROCEDURE — 3008F BODY MASS INDEX DOCD: CPT | Mod: CPTII,,, | Performed by: STUDENT IN AN ORGANIZED HEALTH CARE EDUCATION/TRAINING PROGRAM

## 2025-01-09 PROCEDURE — 99999 PR PBB SHADOW E&M-EST. PATIENT-LVL V: CPT | Mod: PBBFAC,,, | Performed by: STUDENT IN AN ORGANIZED HEALTH CARE EDUCATION/TRAINING PROGRAM

## 2025-01-09 PROCEDURE — 3075F SYST BP GE 130 - 139MM HG: CPT | Mod: CPTII,,, | Performed by: STUDENT IN AN ORGANIZED HEALTH CARE EDUCATION/TRAINING PROGRAM

## 2025-01-09 PROCEDURE — 99214 OFFICE O/P EST MOD 30 MIN: CPT | Mod: S$PBB,,, | Performed by: STUDENT IN AN ORGANIZED HEALTH CARE EDUCATION/TRAINING PROGRAM

## 2025-01-09 PROCEDURE — 3288F FALL RISK ASSESSMENT DOCD: CPT | Mod: CPTII,,, | Performed by: STUDENT IN AN ORGANIZED HEALTH CARE EDUCATION/TRAINING PROGRAM

## 2025-01-09 PROCEDURE — 1159F MED LIST DOCD IN RCRD: CPT | Mod: CPTII,,, | Performed by: STUDENT IN AN ORGANIZED HEALTH CARE EDUCATION/TRAINING PROGRAM

## 2025-01-09 PROCEDURE — 3079F DIAST BP 80-89 MM HG: CPT | Mod: CPTII,,, | Performed by: STUDENT IN AN ORGANIZED HEALTH CARE EDUCATION/TRAINING PROGRAM

## 2025-01-09 NOTE — PROGRESS NOTES
PCP: Beryl Longo DO    Referring Provider:       Subjective:   Maureen Easley is a 64 y.o. female with hx of HTN, dyslipidemia, pre-diabetes and osteoarthritis, who presents for a follow-up.    1/9/25:  Overall, doing well from cardiac standpoint.  Denies recurrence of chest pain, and endorses rare palpitations.  She does continue to have episodes of shortness of breath but they can be with or without exertion.  She notes that they are 1-2 times per week.  Usually associated with wheezing.  She takes p.r.n. albuterol inhaler which helps her symptoms.  She has not had PFTs or seen a pulmonologist.    7/23/24:  Presents for follow up.  Notes she is doing much better.  Chest pain has resolved.  No recurrence of palpitations since patient cut back on caffeine.  Notes she had knee surgery 2 weeks ago with no complications.  She does have occasional episodes of dyspnea with exertion.     4/23/24: Presents for a new patient visit. Endorsed 1 month of intermittent episodes of chest pain, dyspnea w/ and w/o exertion, leg swelling and palpitations. Chest pain described as chest pressure w/ radiation to left shoulder and arm. Initial episode prompted ED presentation on 3/24/24-> pain was ongoing for almost 3 days per report. No acute ischemic EKG changes and hs-trop x1. Since then she has had a few episodes of pain, usually last about 1 hour. Chest pain is not related to exertion. She also notes palpitations - 1-2 times/ week, lasting for upto 5 minutes, resolve w/o any intervention. Unable to assess for orthopnea, sleeps on her side. Denies lightheadedness or syncope.     Fhx: Significant FH of CAD. Father had an MI and CABG in his 40s. Brother and sister both have had stents placed.   Shx: Never smoker     EKG 4/23/2024: Normal sinus rhythm   3/24/2024: Normal sinus rhythm    ECHO   Results for orders placed during the hospital encounter of 05/21/24    Echo    Interpretation Summary    Left Ventricle: The left ventricle  is normal in size. Mild septal thickening. There is normal systolic function. Biplane (2D) method of discs ejection fraction is 55%. There is normal diastolic function.    Right Ventricle: Normal right ventricular cavity size. Systolic function is normal.    Aortic Valve: The aortic valve is a trileaflet valve.    Tricuspid Valve: There is mild regurgitation.    Pulmonic Valve: There is mild regurgitation.    Pulmonary Artery: The estimated pulmonary artery systolic pressure is 35 mmHg.    IVC/SVC: Normal venous pressure at 3 mmHg.    NM MPI 5/21/24:  Impression:     1.  Scintigraphically negative for ischemia.  2. Small size, mild intensity fixed anteroseptal defect with normal wall motion and thickening, suggestive of artifact.  3. the global left ventricular systolic function is normal with an LV ejection fraction of 76 % and no evidence of LV dilatation. Wall motion is normal.        Lab Results   Component Value Date     08/24/2024    K 4.2 08/24/2024     08/24/2024    CO2 31 08/24/2024    BUN 26 (H) 08/24/2024    CREATININE 0.83 08/24/2024    CALCIUM 9.3 08/24/2024    ANIONGAP 7 08/24/2024    ESTGFRAFRICA 76 12/10/2020    EGFRNONAA 67 11/19/2021       Lab Results   Component Value Date    CHOL 208 (H) 04/03/2024    CHOL 196 05/20/2022     Lab Results   Component Value Date    HDL 56 04/03/2024    HDL 64 (H) 05/20/2022     Lab Results   Component Value Date    LDLCALC 108 04/03/2024    LDLCALC 96 05/20/2022     Lab Results   Component Value Date    TRIG 222 (H) 04/03/2024    TRIG 178 (H) 05/20/2022     Lab Results   Component Value Date    CHOLHDL 3.7 04/03/2024    CHOLHDL 3.1 05/20/2022       Lab Results   Component Value Date    WBC 9.09 08/24/2024    HGB 11.1 (L) 08/24/2024    HCT 36.0 (L) 08/24/2024    MCV 87.2 08/24/2024     08/24/2024           Current Outpatient Medications:     albuterol (PROVENTIL/VENTOLIN HFA) 90 mcg/actuation inhaler, Inhale 2 puffs into the lungs every 4 (four)  hours as needed., Disp: , Rfl:     ARIPiprazole (ABILIFY) 10 MG Tab, Take 10 mg by mouth every morning., Disp: , Rfl:     aspirin (ECOTRIN) 81 MG EC tablet, Take 81 mg by mouth nightly., Disp: , Rfl:     atorvastatin (LIPITOR) 10 MG tablet, Take 1 tablet (10 mg total) by mouth every evening., Disp: 90 tablet, Rfl: 1    DULoxetine (CYMBALTA) 30 MG capsule, Take 30 mg by mouth once daily., Disp: , Rfl:     esomeprazole (NEXIUM) 40 MG capsule, Take 1 capsule (40 mg total) by mouth before breakfast., Disp: 90 capsule, Rfl: 0    fexofenadine (ALLEGRA ALLERGY) 180 MG tablet, Take 1 tablet (180 mg total) by mouth once daily., Disp: 90 tablet, Rfl: 1    fluticasone propionate (FLONASE) 50 mcg/actuation nasal spray, SPRAY 2 SPRAYS INTO EACH NOSTRIL EVERY DAY, Disp: 48 mL, Rfl: 0    gabapentin (NEURONTIN) 300 MG capsule, Take 1 capsule (300 mg total) by mouth 3 (three) times daily., Disp: 90 capsule, Rfl: 11    hydroCHLOROthiazide (HYDRODIURIL) 25 MG tablet, Take 1 tablet (25 mg total) by mouth once daily., Disp: 90 tablet, Rfl: 1    HYDROcodone-acetaminophen (NORCO) 5-325 mg per tablet, Take 1 tablet by mouth daily as needed., Disp: , Rfl:     lisinopriL (PRINIVIL,ZESTRIL) 20 MG tablet, Take 1 tablet (20 mg total) by mouth once daily., Disp: 90 tablet, Rfl: 1    metroNIDAZOLE (METROGEL) 0.75 % gel, Apply to face daily, Disp: 45 g, Rfl: 5    mirtazapine (REMERON) 15 MG tablet, Take 15 mg by mouth every evening., Disp: , Rfl:     montelukast (SINGULAIR) 10 mg tablet, Take 1 tablet (10 mg total) by mouth every evening., Disp: 90 tablet, Rfl: 1    pramipexole (MIRAPEX) 0.5 MG tablet, Take 1 tablet (0.5 mg total) by mouth every evening., Disp: 90 tablet, Rfl: 1    traZODone (DESYREL) 100 MG tablet, TAKE 1 TABLET BY MOUTH EVERY DAY AT BEDTIME AS DIRECTED, Disp: , Rfl:     estradioL (ESTRACE) 0.01 % (0.1 mg/gram) vaginal cream, Place 1 g vaginally twice a week., Disp: 42.5 g, Rfl: 11    Review of Systems   Constitutional:  Negative  "for chills, diaphoresis, fever and malaise/fatigue.   Respiratory:  Positive for shortness of breath. Negative for cough.    Cardiovascular:  Negative for chest pain, palpitations, orthopnea, claudication, leg swelling and PND.   Gastrointestinal:  Negative for abdominal pain, heartburn, nausea and vomiting.   Neurological:  Negative for dizziness.       Objective:   /80 (BP Location: Right arm, Patient Position: Sitting)   Pulse 86   Ht 5' 6" (1.676 m)   Wt 107.6 kg (237 lb 3.2 oz)   SpO2 98%   BMI 38.29 kg/m²     Physical Exam  Constitutional:       General: She is not in acute distress.     Appearance: Normal appearance.   Neck:      Comments: No jugular venous distension  Cardiovascular:      Rate and Rhythm: Normal rate and regular rhythm.      Pulses: Normal pulses.      Heart sounds: Normal heart sounds. No murmur heard.     No friction rub. No gallop.   Pulmonary:      Effort: Pulmonary effort is normal.      Breath sounds: Normal breath sounds. No wheezing or rales.   Musculoskeletal:      Cervical back: Neck supple.      Right lower leg: No edema.      Left lower leg: No edema.   Skin:     General: Skin is warm and dry.   Neurological:      Mental Status: She is alert.           Assessment:     1. Shortness of breath  Complete PFT w/ bronchodilator      2. Wheezing  Complete PFT w/ bronchodilator            Plan:   No problem-specific Assessment & Plan notes found for this encounter.    Chest pain (resolved)  - NM MPI negative for ischemia     Palpitations (improved)  Significant improvement since patient cut back on caffeine  - 14-day ambulatory cardiac monitor with no cardiac arrhythmia    Dyspnea   With or without exertion  Euvolemic on exam  - echo with normal biventricular function and no significant valvular disease  - PFTs scheduled     Follow-up in 1 year        "

## 2025-01-14 ENCOUNTER — OFFICE VISIT (OUTPATIENT)
Dept: FAMILY MEDICINE | Facility: CLINIC | Age: 65
End: 2025-01-14
Payer: MEDICARE

## 2025-01-14 VITALS
DIASTOLIC BLOOD PRESSURE: 70 MMHG | SYSTOLIC BLOOD PRESSURE: 135 MMHG | HEART RATE: 102 BPM | WEIGHT: 233 LBS | BODY MASS INDEX: 37.61 KG/M2 | TEMPERATURE: 98 F | OXYGEN SATURATION: 97 %

## 2025-01-14 DIAGNOSIS — Z20.828 EXPOSURE TO VIRAL DISEASE: ICD-10-CM

## 2025-01-14 DIAGNOSIS — J40 BRONCHITIS: Primary | ICD-10-CM

## 2025-01-14 LAB
CTP QC/QA: YES
CTP QC/QA: YES
POC MOLECULAR INFLUENZA A AGN: NEGATIVE
POC MOLECULAR INFLUENZA B AGN: NEGATIVE
SARS-COV-2 RDRP RESP QL NAA+PROBE: NEGATIVE

## 2025-01-14 PROCEDURE — 96372 THER/PROPH/DIAG INJ SC/IM: CPT | Mod: ,,, | Performed by: FAMILY MEDICINE

## 2025-01-14 PROCEDURE — 3078F DIAST BP <80 MM HG: CPT | Mod: ,,, | Performed by: FAMILY MEDICINE

## 2025-01-14 PROCEDURE — 99213 OFFICE O/P EST LOW 20 MIN: CPT | Mod: 25,,, | Performed by: FAMILY MEDICINE

## 2025-01-14 PROCEDURE — 3075F SYST BP GE 130 - 139MM HG: CPT | Mod: ,,, | Performed by: FAMILY MEDICINE

## 2025-01-14 PROCEDURE — 1159F MED LIST DOCD IN RCRD: CPT | Mod: ,,, | Performed by: FAMILY MEDICINE

## 2025-01-14 PROCEDURE — 1101F PT FALLS ASSESS-DOCD LE1/YR: CPT | Mod: ,,, | Performed by: FAMILY MEDICINE

## 2025-01-14 PROCEDURE — 3288F FALL RISK ASSESSMENT DOCD: CPT | Mod: ,,, | Performed by: FAMILY MEDICINE

## 2025-01-14 PROCEDURE — 87502 INFLUENZA DNA AMP PROBE: CPT | Mod: RHCUB | Performed by: FAMILY MEDICINE

## 2025-01-14 PROCEDURE — 1160F RVW MEDS BY RX/DR IN RCRD: CPT | Mod: ,,, | Performed by: FAMILY MEDICINE

## 2025-01-14 PROCEDURE — 87635 SARS-COV-2 COVID-19 AMP PRB: CPT | Mod: RHCUB | Performed by: FAMILY MEDICINE

## 2025-01-14 PROCEDURE — 3008F BODY MASS INDEX DOCD: CPT | Mod: ,,, | Performed by: FAMILY MEDICINE

## 2025-01-14 RX ORDER — PREDNISONE 20 MG/1
20 TABLET ORAL DAILY
Qty: 5 TABLET | Refills: 0 | Status: SHIPPED | OUTPATIENT
Start: 2025-01-14 | End: 2025-01-19

## 2025-01-14 RX ORDER — AMOXICILLIN AND CLAVULANATE POTASSIUM 875; 125 MG/1; MG/1
1 TABLET, FILM COATED ORAL 2 TIMES DAILY
Qty: 14 TABLET | Refills: 0 | Status: SHIPPED | OUTPATIENT
Start: 2025-01-14 | End: 2025-01-21

## 2025-01-14 RX ORDER — BENZONATATE 100 MG/1
100 CAPSULE ORAL 3 TIMES DAILY PRN
Qty: 20 CAPSULE | Refills: 0 | Status: SHIPPED | OUTPATIENT
Start: 2025-01-14

## 2025-01-14 RX ORDER — DEXAMETHASONE SODIUM PHOSPHATE 4 MG/ML
4 INJECTION, SOLUTION INTRA-ARTICULAR; INTRALESIONAL; INTRAMUSCULAR; INTRAVENOUS; SOFT TISSUE
Status: COMPLETED | OUTPATIENT
Start: 2025-01-14 | End: 2025-01-14

## 2025-01-14 RX ADMIN — DEXAMETHASONE SODIUM PHOSPHATE 4 MG: 4 INJECTION, SOLUTION INTRA-ARTICULAR; INTRALESIONAL; INTRAMUSCULAR; INTRAVENOUS; SOFT TISSUE at 09:01

## 2025-01-14 NOTE — PROGRESS NOTES
Subjective:       Patient ID: Maureen Easley is a 64 y.o. female.    Chief Complaint: Cough, Nasal Congestion, and Headache (Symptoms started sunday)    Cough  Associated symptoms include headaches, postnasal drip and rhinorrhea. Pertinent negatives include no chest pain, chills, ear pain, fever, myalgias, rash, sore throat, shortness of breath or wheezing. There is no history of environmental allergies.   Headache   Associated symptoms include coughing, rhinorrhea and sinus pressure. Pertinent negatives include no abdominal pain, back pain, dizziness, ear pain, fever, hearing loss, nausea, neck pain, numbness, photophobia, seizures, sore throat, tinnitus, vomiting or weakness.     Review of Systems   Constitutional:  Negative for activity change, appetite change, chills, diaphoresis, fatigue, fever and unexpected weight change.   HENT:  Positive for nasal congestion, postnasal drip, rhinorrhea and sinus pressure/congestion. Negative for dental problem, drooling, ear discharge, ear pain, facial swelling, hearing loss, mouth sores, nosebleeds, sneezing, sore throat, tinnitus, trouble swallowing, voice change and goiter.    Eyes:  Negative for photophobia, discharge, itching and visual disturbance.   Respiratory:  Positive for cough. Negative for apnea, choking, chest tightness, shortness of breath, wheezing and stridor.    Cardiovascular:  Negative for chest pain, palpitations, leg swelling and claudication.   Gastrointestinal:  Negative for abdominal distention, abdominal pain, anal bleeding, blood in stool, change in bowel habit, constipation, diarrhea, nausea and vomiting.   Endocrine: Negative for cold intolerance, heat intolerance, polydipsia, polyphagia and polyuria.   Genitourinary:  Negative for bladder incontinence, decreased urine volume, difficulty urinating, dysuria, enuresis, flank pain, frequency, hematuria, nocturia, pelvic pain and urgency.   Musculoskeletal:  Negative for arthralgias, back pain, gait  problem, joint swelling, leg pain, myalgias, neck pain, neck stiffness and joint deformity.   Integumentary:  Negative for pallor, rash, wound, breast mass and breast tenderness.   Allergic/Immunologic: Negative for environmental allergies, food allergies and immunocompromised state.   Neurological:  Positive for headaches. Negative for dizziness, vertigo, tremors, seizures, syncope, facial asymmetry, speech difficulty, weakness, light-headedness, numbness, memory loss and coordination difficulties.   Hematological:  Negative for adenopathy. Does not bruise/bleed easily.   Psychiatric/Behavioral:  Negative for agitation, behavioral problems, confusion, decreased concentration, dysphoric mood, hallucinations, self-injury, sleep disturbance and suicidal ideas. The patient is not nervous/anxious and is not hyperactive.    Breast: Negative for mass and tenderness        Objective:      Physical Exam  Vitals reviewed.   Constitutional:       Appearance: Normal appearance.   HENT:      Head: Normocephalic and atraumatic.      Right Ear: Tympanic membrane, ear canal and external ear normal.      Left Ear: Tympanic membrane, ear canal and external ear normal.      Nose: Congestion and rhinorrhea present.      Mouth/Throat:      Mouth: Mucous membranes are moist.      Pharynx: Oropharynx is clear. Posterior oropharyngeal erythema present.   Eyes:      Extraocular Movements: Extraocular movements intact.      Conjunctiva/sclera: Conjunctivae normal.      Pupils: Pupils are equal, round, and reactive to light.   Cardiovascular:      Rate and Rhythm: Normal rate and regular rhythm.      Pulses: Normal pulses.      Heart sounds: Normal heart sounds.   Pulmonary:      Effort: Pulmonary effort is normal.      Breath sounds: Rhonchi present.   Abdominal:      General: Bowel sounds are normal.      Palpations: Abdomen is soft.   Musculoskeletal:         General: Normal range of motion.      Cervical back: Normal range of motion and  neck supple.   Skin:     General: Skin is warm and dry.   Neurological:      General: No focal deficit present.      Mental Status: She is alert. Mental status is at baseline.   Psychiatric:         Mood and Affect: Mood normal.         Behavior: Behavior normal.         Thought Content: Thought content normal.         Judgment: Judgment normal.         Assessment:       1. Bronchitis    2. Exposure to viral disease        Plan:     Bronchitis  -     dexAMETHasone injection 4 mg  -     amoxicillin-clavulanate 875-125mg (AUGMENTIN) 875-125 mg per tablet; Take 1 tablet by mouth 2 (two) times a day. for 7 days  Dispense: 14 tablet; Refill: 0  -     predniSONE (DELTASONE) 20 MG tablet; Take 1 tablet (20 mg total) by mouth once daily. for 5 days  Dispense: 5 tablet; Refill: 0  -     benzonatate (TESSALON) 100 MG capsule; Take 1 capsule (100 mg total) by mouth 3 (three) times daily as needed for Cough.  Dispense: 20 capsule; Refill: 0    Exposure to viral disease  -     POCT COVID-19 Rapid Screening  -     POCT Influenza A/B Molecular

## 2025-01-16 ENCOUNTER — CLINICAL SUPPORT (OUTPATIENT)
Dept: PULMONOLOGY | Facility: HOSPITAL | Age: 65
End: 2025-01-16
Attending: STUDENT IN AN ORGANIZED HEALTH CARE EDUCATION/TRAINING PROGRAM
Payer: MEDICARE

## 2025-01-16 DIAGNOSIS — R06.2 WHEEZING: ICD-10-CM

## 2025-01-16 DIAGNOSIS — R06.02 SHORTNESS OF BREATH: ICD-10-CM

## 2025-01-16 PROCEDURE — 27100098 HC SPACER

## 2025-01-16 PROCEDURE — 94060 EVALUATION OF WHEEZING: CPT

## 2025-01-16 PROCEDURE — 94727 GAS DIL/WSHOT DETER LNG VOL: CPT

## 2025-01-16 PROCEDURE — 94729 DIFFUSING CAPACITY: CPT

## 2025-01-19 PROCEDURE — 94060 EVALUATION OF WHEEZING: CPT | Mod: 26,,, | Performed by: INTERNAL MEDICINE

## 2025-01-19 PROCEDURE — 94729 DIFFUSING CAPACITY: CPT | Mod: 26,,, | Performed by: INTERNAL MEDICINE

## 2025-01-19 PROCEDURE — 94727 GAS DIL/WSHOT DETER LNG VOL: CPT | Mod: 26,,, | Performed by: INTERNAL MEDICINE

## 2025-01-19 NOTE — PROCEDURES
Pulmonary function test   Forced vital capacity 3.11 L 96% predicted   FEV1 2.52 L or 100% predicted   FEV1 ratio 81%   Normal small airways   Normal lung volumes   Normal DLCO   Normal flow volume loop   Normal pulmonary function test

## 2025-02-22 ENCOUNTER — HOSPITAL ENCOUNTER (EMERGENCY)
Facility: HOSPITAL | Age: 65
Discharge: HOME | End: 2025-02-22
Attending: EMERGENCY MEDICINE
Payer: MEDICARE

## 2025-02-22 VITALS
RESPIRATION RATE: 18 BRPM | WEIGHT: 237.81 LBS | HEART RATE: 109 BPM | OXYGEN SATURATION: 96 % | TEMPERATURE: 98 F | HEIGHT: 66 IN | DIASTOLIC BLOOD PRESSURE: 56 MMHG | BODY MASS INDEX: 38.22 KG/M2 | SYSTOLIC BLOOD PRESSURE: 122 MMHG

## 2025-02-22 DIAGNOSIS — R07.9 CHEST PAIN: ICD-10-CM

## 2025-02-22 DIAGNOSIS — R07.9 CHEST PAIN, UNSPECIFIED TYPE: Primary | ICD-10-CM

## 2025-02-22 DIAGNOSIS — K21.9 GASTROESOPHAGEAL REFLUX DISEASE, UNSPECIFIED WHETHER ESOPHAGITIS PRESENT: ICD-10-CM

## 2025-02-22 LAB
ALBUMIN SERPL BCP-MCNC: 3.3 G/DL (ref 3.4–4.8)
ALBUMIN/GLOB SERPL: 1 {RATIO}
ALP SERPL-CCNC: 81 U/L (ref 40–150)
ALT SERPL W P-5'-P-CCNC: 13 U/L
ANION GAP SERPL CALCULATED.3IONS-SCNC: 19 MMOL/L (ref 7–16)
AST SERPL W P-5'-P-CCNC: 30 U/L (ref 5–34)
BASOPHILS # BLD AUTO: 0.04 K/UL (ref 0–0.2)
BASOPHILS NFR BLD AUTO: 0.3 % (ref 0–1)
BILIRUB SERPL-MCNC: 0.7 MG/DL
BUN SERPL-MCNC: 25 MG/DL (ref 10–20)
BUN/CREAT SERPL: 26 (ref 6–20)
CALCIUM SERPL-MCNC: 8.7 MG/DL (ref 8.4–10.2)
CHLORIDE SERPL-SCNC: 99 MMOL/L (ref 98–107)
CO2 SERPL-SCNC: 22 MMOL/L (ref 23–31)
CREAT SERPL-MCNC: 0.95 MG/DL (ref 0.55–1.02)
DIFFERENTIAL METHOD BLD: ABNORMAL
EGFR (NO RACE VARIABLE) (RUSH/TITUS): 67 ML/MIN/1.73M2
EOSINOPHIL # BLD AUTO: 0.11 K/UL (ref 0–0.5)
EOSINOPHIL NFR BLD AUTO: 0.9 % (ref 1–4)
ERYTHROCYTE [DISTWIDTH] IN BLOOD BY AUTOMATED COUNT: 15.9 % (ref 11.5–14.5)
GLOBULIN SER-MCNC: 3.4 G/DL (ref 2–4)
GLUCOSE SERPL-MCNC: 117 MG/DL (ref 82–115)
HCT VFR BLD AUTO: 35.8 % (ref 38–47)
HGB BLD-MCNC: 11.3 G/DL (ref 12–16)
IMM GRANULOCYTES # BLD AUTO: 0.12 K/UL (ref 0–0.04)
IMM GRANULOCYTES NFR BLD: 0.9 % (ref 0–0.4)
LIPASE SERPL-CCNC: 28 U/L
LYMPHOCYTES # BLD AUTO: 3 K/UL (ref 1–4.8)
LYMPHOCYTES NFR BLD AUTO: 23.5 % (ref 27–41)
MCH RBC QN AUTO: 28.3 PG (ref 27–31)
MCHC RBC AUTO-ENTMCNC: 31.6 G/DL (ref 32–36)
MCV RBC AUTO: 89.5 FL (ref 80–96)
MONOCYTES # BLD AUTO: 1.11 K/UL (ref 0–0.8)
MONOCYTES NFR BLD AUTO: 8.7 % (ref 2–6)
MPC BLD CALC-MCNC: 10.4 FL (ref 9.4–12.4)
NEUTROPHILS # BLD AUTO: 8.38 K/UL (ref 1.8–7.7)
NEUTROPHILS NFR BLD AUTO: 65.7 % (ref 53–65)
NRBC # BLD AUTO: 0 X10E3/UL
NRBC, AUTO (.00): 0 %
NT-PROBNP SERPL-MCNC: 14 PG/ML (ref 1–125)
PLATELET # BLD AUTO: 294 K/UL (ref 150–400)
POTASSIUM SERPL-SCNC: 4.5 MMOL/L (ref 3.5–5.1)
PROT SERPL-MCNC: 6.7 G/DL (ref 5.8–7.6)
RBC # BLD AUTO: 4 M/UL (ref 4.2–5.4)
SODIUM SERPL-SCNC: 135 MMOL/L (ref 136–145)
TROPONIN I SERPL HS-MCNC: <2.7 NG/L
TROPONIN I SERPL HS-MCNC: <2.7 NG/L
WBC # BLD AUTO: 12.76 K/UL (ref 4.5–11)

## 2025-02-22 PROCEDURE — 96375 TX/PRO/DX INJ NEW DRUG ADDON: CPT

## 2025-02-22 PROCEDURE — 83690 ASSAY OF LIPASE: CPT | Performed by: EMERGENCY MEDICINE

## 2025-02-22 PROCEDURE — 99285 EMERGENCY DEPT VISIT HI MDM: CPT | Mod: 25

## 2025-02-22 PROCEDURE — 85025 COMPLETE CBC W/AUTO DIFF WBC: CPT | Performed by: EMERGENCY MEDICINE

## 2025-02-22 PROCEDURE — 63600175 PHARM REV CODE 636 W HCPCS: Performed by: EMERGENCY MEDICINE

## 2025-02-22 PROCEDURE — 84484 ASSAY OF TROPONIN QUANT: CPT | Performed by: EMERGENCY MEDICINE

## 2025-02-22 PROCEDURE — 96374 THER/PROPH/DIAG INJ IV PUSH: CPT

## 2025-02-22 PROCEDURE — 93005 ELECTROCARDIOGRAM TRACING: CPT

## 2025-02-22 PROCEDURE — 93010 ELECTROCARDIOGRAM REPORT: CPT | Mod: ,,, | Performed by: HOSPITALIST

## 2025-02-22 PROCEDURE — 80053 COMPREHEN METABOLIC PANEL: CPT | Performed by: EMERGENCY MEDICINE

## 2025-02-22 PROCEDURE — 25000003 PHARM REV CODE 250: Performed by: EMERGENCY MEDICINE

## 2025-02-22 PROCEDURE — 36415 COLL VENOUS BLD VENIPUNCTURE: CPT | Performed by: EMERGENCY MEDICINE

## 2025-02-22 PROCEDURE — 83880 ASSAY OF NATRIURETIC PEPTIDE: CPT | Performed by: EMERGENCY MEDICINE

## 2025-02-22 RX ORDER — ALUMINUM HYDROXIDE, MAGNESIUM HYDROXIDE, AND SIMETHICONE 1200; 120; 1200 MG/30ML; MG/30ML; MG/30ML
30 SUSPENSION ORAL ONCE
Status: COMPLETED | OUTPATIENT
Start: 2025-02-22 | End: 2025-02-22

## 2025-02-22 RX ORDER — LIDOCAINE HYDROCHLORIDE 20 MG/ML
15 SOLUTION OROPHARYNGEAL ONCE
Status: COMPLETED | OUTPATIENT
Start: 2025-02-22 | End: 2025-02-22

## 2025-02-22 RX ORDER — ONDANSETRON HYDROCHLORIDE 2 MG/ML
4 INJECTION, SOLUTION INTRAVENOUS
Status: COMPLETED | OUTPATIENT
Start: 2025-02-22 | End: 2025-02-22

## 2025-02-22 RX ORDER — MORPHINE SULFATE 2 MG/ML
2 INJECTION, SOLUTION INTRAMUSCULAR; INTRAVENOUS
Refills: 0 | Status: COMPLETED | OUTPATIENT
Start: 2025-02-22 | End: 2025-02-22

## 2025-02-22 RX ORDER — PANTOPRAZOLE SODIUM 40 MG/10ML
40 INJECTION, POWDER, LYOPHILIZED, FOR SOLUTION INTRAVENOUS
Status: COMPLETED | OUTPATIENT
Start: 2025-02-22 | End: 2025-02-22

## 2025-02-22 RX ORDER — PANTOPRAZOLE SODIUM 40 MG/1
40 TABLET, DELAYED RELEASE ORAL DAILY
Qty: 30 TABLET | Refills: 1 | Status: SHIPPED | OUTPATIENT
Start: 2025-02-22 | End: 2025-03-24

## 2025-02-22 RX ADMIN — ONDANSETRON 4 MG: 2 INJECTION INTRAMUSCULAR; INTRAVENOUS at 03:02

## 2025-02-22 RX ADMIN — LIDOCAINE HYDROCHLORIDE 15 ML: 20 SOLUTION ORAL at 01:02

## 2025-02-22 RX ADMIN — MORPHINE SULFATE 2 MG: 2 INJECTION, SOLUTION INTRAMUSCULAR; INTRAVENOUS at 03:02

## 2025-02-22 RX ADMIN — ALUMINUM HYDROXIDE, MAGNESIUM HYDROXIDE, AND SIMETHICONE 30 ML: 200; 200; 20 SUSPENSION ORAL at 01:02

## 2025-02-22 RX ADMIN — PANTOPRAZOLE SODIUM 40 MG: 40 INJECTION, POWDER, LYOPHILIZED, FOR SOLUTION INTRAVENOUS at 03:02

## 2025-02-22 NOTE — ED NOTES
Pt states having some relief from medications given, but still having some pain when taking a deep breath.

## 2025-02-22 NOTE — ED TRIAGE NOTES
Pt presents to the ED with c/o midsternal aching chest pain and shortness of breath that started last night, states no cardiac history except for hypertension.

## 2025-02-22 NOTE — ED PROVIDER NOTES
"Encounter Date: 2/22/2025    SCRIBE #1 NOTE: I, Urvashi Sabrina, am scribing for, and in the presence of,  Willi Wills MD.       History     Chief Complaint   Patient presents with    Chest Pain    Shortness of Breath     This 64 y.o. female pt presents to the ED with c/o Chest pain and SOB. The pt reports chest pain with SOB that started and became worst Last night. Pt reports, when she takes a deep breath, there is a sharp pain in the middle of her chest. Pt also reports she has a cough. The pt said the pain occurs when she lays down flat on the bed. Pt states she has not been able to sleep well because of this chest pain. Pt has Mhx of  Acid reflux , HTN and being dx with bronchitis at a local Urgent care clinic. Pt said she takes Nexium 40 mg 1x a day but she took and extra one last night when the chest pain started. Pt has seen Dr. Taylor that prescribed Nexium and an inhaler just in case the chest pain occurs in the future. Pt did mention she had been burping a lot more last night, and that she did eat some spicy "deer sausage" last night. Pt's PCP is Beryl Mujica, which she saw 3 weeks ago.     The history is provided by the patient and the spouse. No  was used.     Review of patient's allergies indicates:   Allergen Reactions    Sulfa (sulfonamide antibiotics)     Sulfamethoxazole-trimethoprim      Other reaction(s): Unknown    Clarithromycin Rash     Past Medical History:   Diagnosis Date    Bipolar 2 disorder     Essential hypertension 05/24/2023    Goal BP less than 140/80   Lisinopril 10 mg oral daily      Hyperlipidemia     Hypertension     Type 2 macular telangiectasis, bilateral 08/13/2018     Past Surgical History:   Procedure Laterality Date    COLONOSCOPY W/ POLYPECTOMY  10/02/2024    Dr. Suarez    HYSTERECTOMY  1998    KNEE ARTHROSCOPY W/ MENISCECTOMY Right 07/11/2024    Procedure: ARTHROSCOPY, KNEE, WITH PARTIAL MEDIAL AND LATERAL MENISCECTOMY;  Surgeon: Balaji Mcfarlane, " MD;  Location: [facility] OR;  Service: Orthopedics;  Laterality: Right;     Family History   Problem Relation Name Age of Onset    Hypertension Other      Breast cancer Neg Hx      Colon cancer Neg Hx      Ovarian cancer Neg Hx       Social History[1]  Review of Systems   Constitutional:  Negative for fever.   HENT:  Negative for congestion.    Respiratory:  Positive for cough and shortness of breath.    Cardiovascular:  Positive for chest pain. Negative for leg swelling.   Gastrointestinal:  Negative for abdominal pain, nausea and vomiting.   Musculoskeletal:  Negative for back pain.       Physical Exam     Initial Vitals [02/22/25 1303]   BP Pulse Resp Temp SpO2   (!) 148/76 99 16 97.8 °F (36.6 °C) 95 %      MAP       --         Physical Exam    Nursing note and vitals reviewed.  Constitutional: Vital signs are normal. She is Obese .   HENT:   Head: Normocephalic and atraumatic.   Eyes: EOM are normal. Pupils are equal, round, and reactive to light.   Neck: Neck supple. No thyromegaly present.   Normal range of motion.  Cardiovascular:  Normal rate, regular rhythm, normal heart sounds and intact distal pulses.           No murmur heard.  Pulmonary/Chest: Breath sounds normal. No respiratory distress. She has no wheezes.   Abdominal: Abdomen is soft. Bowel sounds are normal. She exhibits no distension. There is no abdominal tenderness.   Musculoskeletal:         General: No tenderness or edema. Normal range of motion.      Cervical back: Normal range of motion and neck supple.     Lymphadenopathy:     She has no cervical adenopathy.   Neurological: She is alert and oriented to person, place, and time. She has normal strength. No cranial nerve deficit or sensory deficit.   Skin: Skin is warm and dry. No rash noted.   Psychiatric: She has a normal mood and affect.         ED Course   Procedures  Labs Reviewed   COMPREHENSIVE METABOLIC PANEL - Abnormal       Result Value    Sodium 135 (*)     Potassium 4.5       Chloride 99      CO2 22 (*)     Anion Gap 19 (*)     Glucose 117 (*)     BUN 25 (*)     Creatinine 0.95      BUN/Creatinine Ratio 26 (*)     Calcium 8.7      Total Protein 6.7      Albumin 3.3 (*)     Globulin 3.4      A/G Ratio 1.0      Bilirubin, Total 0.7      Alk Phos 81      ALT 13      AST 30      eGFR 67     CBC WITH DIFFERENTIAL - Abnormal    WBC 12.76 (*)     RBC 4.00 (*)     Hemoglobin 11.3 (*)     Hematocrit 35.8 (*)     MCV 89.5      MCH 28.3      MCHC 31.6 (*)     RDW 15.9 (*)     Platelet Count 294      MPV 10.4      Neutrophils % 65.7 (*)     Lymphocytes % 23.5 (*)     Monocytes % 8.7 (*)     Eosinophils % 0.9 (*)     Basophils % 0.3      Immature Granulocytes % 0.9 (*)     nRBC, Auto 0.0      Neutrophils, Abs 8.38 (*)     Lymphocytes, Absolute 3.00      Monocytes, Absolute 1.11 (*)     Eosinophils, Absolute 0.11      Basophils, Absolute 0.04      Immature Granulocytes, Absolute 0.12 (*)     nRBC, Absolute 0.00      Diff Type Auto     TROPONIN I - Normal    Troponin I High Sensitivity <2.7     NT-PRO NATRIURETIC PEPTIDE - Normal    ProBNP 14     LIPASE - Normal    Lipase 28     TROPONIN I - Normal    Troponin I High Sensitivity <2.7     CBC W/ AUTO DIFFERENTIAL    Narrative:     The following orders were created for panel order CBC auto differential.  Procedure                               Abnormality         Status                     ---------                               -----------         ------                     CBC with Differential[[id number]]       Abnormal            Final result                 Please view results for these tests on the individual orders.          Imaging Results              X-Ray Chest 1 View (Final result)  Result time 02/22/25 14:22:17      Final result by Lorenzo Davidson MD (02/22/25 14:22:17)                   Impression:      No radiographic acute intrathoracic process seen on this single view.      Electronically signed by: Lorenzo Davidson  MD  Date:    02/22/2025  Time:    14:22               Narrative:    EXAMINATION:  XR CHEST 1 VIEW    CLINICAL HISTORY:  Chest pain, unspecified    TECHNIQUE:  Single frontal view of the chest was performed.    COMPARISON:  Chest radiograph 08/24/2024, CT abdomen and pelvis 11/29/2016    FINDINGS:  Monitoring leads overlie the chest.  Patient is slightly rotated.  Resolution is limited by body habitus with underpenetration.    The lungs are well expanded and clear, with normal appearance of pulmonary vasculature and no pleural effusion or pneumothorax.    The cardiac silhouette is normal in size. The hilar and mediastinal contours are within normal limits for age noting calcification at the arch.    No acute osseous process seen.  PA and lateral views can be obtained.                                    X-Rays:   Independently Interpreted Readings:   Other Readings:  Details      Reading Physician Reading Date Result Priority  Lorenzo Davidson MD  [phone]  2/22/2025 STAT    Narrative & Impression  EXAMINATION:  XR CHEST 1 VIEW     CLINICAL HISTORY:  Chest pain, unspecified     TECHNIQUE:  Single frontal view of the chest was performed.     COMPARISON:  Chest radiograph 08/24/2024, CT abdomen and pelvis 11/29/2016     FINDINGS:  Monitoring leads overlie the chest.  Patient is slightly rotated.  Resolution is limited by body habitus with underpenetration.     The lungs are well expanded and clear, with normal appearance of pulmonary vasculature and no pleural effusion or pneumothorax.     The cardiac silhouette is normal in size. The hilar and mediastinal contours are within normal limits for age noting calcification at the arch.     No acute osseous process seen.  PA and lateral views can be obtained.     Impression:     No radiographic acute intrathoracic process seen on this single view.        Electronically signed by:Lorenzo Davidson MD  Date:                                            02/22/2025  Time:                                            14:22      Exam Ended: 02/22/25 14:10 CST Last Resulted: 02/22/25 14:22 CST        Medications   aluminum-magnesium hydroxide-simethicone 200-200-20 mg/5 mL suspension 30 mL (30 mLs Oral Given 2/22/25 1354)     And   LIDOcaine viscous HCl 2% oral solution 15 mL (15 mLs Oral Given 2/22/25 1354)   pantoprazole injection 40 mg (40 mg Intravenous Given 2/22/25 1557)   morphine injection 2 mg (2 mg Intravenous Given 2/22/25 1558)   ondansetron injection 4 mg (4 mg Intravenous Given 2/22/25 1557)     Medical Decision Making  Amount and/or Complexity of Data Reviewed  Labs: ordered.  Radiology: ordered.    Risk  OTC drugs.  Prescription drug management.              Attending Attestation:           Physician Attestation for Scribe:  Physician Attestation Statement for Scribe #1: I, Willi Wills MD, reviewed documentation, as scribed by Urvashi Bennett in my presence, and it is both accurate and complete.             ED Course as of 02/24/25 0548   Sat Feb 22, 2025   1700 02/22/25 1424  X-Ray Chest 1 View  Performed: 02/22/25 1410  Final         Impression:  No radiographic acute intrathoracic process seen on this single view.      Electronically signed by: Lorenzo Davdison MD  Date: 02/22/2025  Time: 14:22       [CM]      ED Course User Index  [CM] Urvashi Bennett                           Clinical Impression:  Final diagnoses:  [R07.9] Chest pain  [R07.9] Chest pain, unspecified type (Primary)  [K21.9] Gastroesophageal reflux disease, unspecified whether esophagitis present          ED Disposition Condition    Discharge Stable          ED Prescriptions       Medication Sig Dispense Start Date End Date Auth. Provider    pantoprazole (PROTONIX) 40 MG tablet Take 1 tablet (40 mg total) by mouth once daily. 30 tablet 2/22/2025 3/24/2025 Willi Wills MD          Follow-up Information    None              [1]   Social History  Tobacco Use    Smoking status: Never    Smokeless tobacco: Never    Substance Use Topics    Alcohol use: Never    Drug use: Never        Willi Wills MD  02/24/25 0590

## 2025-02-23 NOTE — DISCHARGE INSTRUCTIONS
Take Protonix twice a day for 10 days then once a day    Follow up with Cardiology and with GI    Dietary changes such as avoidance of spicy foods or eating late in the evening as we discussed.  Also avoid carbonated beverages am caffeine

## 2025-02-24 ENCOUNTER — TELEPHONE (OUTPATIENT)
Dept: EMERGENCY MEDICINE | Facility: HOSPITAL | Age: 65
End: 2025-02-24
Payer: MEDICARE

## 2025-02-25 LAB
OHS QRS DURATION: 74 MS
OHS QTC CALCULATION: 453 MS

## 2025-03-10 ENCOUNTER — RESULTS FOLLOW-UP (OUTPATIENT)
Dept: GASTROENTEROLOGY | Facility: CLINIC | Age: 65
End: 2025-03-10

## 2025-03-10 ENCOUNTER — OFFICE VISIT (OUTPATIENT)
Dept: GASTROENTEROLOGY | Facility: CLINIC | Age: 65
End: 2025-03-10
Payer: MEDICARE

## 2025-03-10 VITALS
HEART RATE: 113 BPM | BODY MASS INDEX: 37.45 KG/M2 | DIASTOLIC BLOOD PRESSURE: 95 MMHG | HEIGHT: 66 IN | SYSTOLIC BLOOD PRESSURE: 198 MMHG | WEIGHT: 233 LBS | OXYGEN SATURATION: 96 %

## 2025-03-10 DIAGNOSIS — D64.9 ANEMIA, UNSPECIFIED TYPE: ICD-10-CM

## 2025-03-10 DIAGNOSIS — R13.19 ESOPHAGEAL DYSPHAGIA: ICD-10-CM

## 2025-03-10 DIAGNOSIS — K21.9 GASTROESOPHAGEAL REFLUX DISEASE, UNSPECIFIED WHETHER ESOPHAGITIS PRESENT: Primary | ICD-10-CM

## 2025-03-10 PROCEDURE — 4010F ACE/ARB THERAPY RXD/TAKEN: CPT | Mod: CPTII,,, | Performed by: NURSE PRACTITIONER

## 2025-03-10 PROCEDURE — 1101F PT FALLS ASSESS-DOCD LE1/YR: CPT | Mod: CPTII,,, | Performed by: NURSE PRACTITIONER

## 2025-03-10 PROCEDURE — 99999 PR PBB SHADOW E&M-EST. PATIENT-LVL V: CPT | Mod: PBBFAC,,, | Performed by: NURSE PRACTITIONER

## 2025-03-10 PROCEDURE — 3288F FALL RISK ASSESSMENT DOCD: CPT | Mod: CPTII,,, | Performed by: NURSE PRACTITIONER

## 2025-03-10 PROCEDURE — 3080F DIAST BP >= 90 MM HG: CPT | Mod: CPTII,,, | Performed by: NURSE PRACTITIONER

## 2025-03-10 PROCEDURE — 1160F RVW MEDS BY RX/DR IN RCRD: CPT | Mod: CPTII,,, | Performed by: NURSE PRACTITIONER

## 2025-03-10 PROCEDURE — 1159F MED LIST DOCD IN RCRD: CPT | Mod: CPTII,,, | Performed by: NURSE PRACTITIONER

## 2025-03-10 PROCEDURE — 3008F BODY MASS INDEX DOCD: CPT | Mod: CPTII,,, | Performed by: NURSE PRACTITIONER

## 2025-03-10 PROCEDURE — 99214 OFFICE O/P EST MOD 30 MIN: CPT | Mod: S$PBB,,, | Performed by: NURSE PRACTITIONER

## 2025-03-10 PROCEDURE — 3077F SYST BP >= 140 MM HG: CPT | Mod: CPTII,,, | Performed by: NURSE PRACTITIONER

## 2025-03-10 PROCEDURE — 99215 OFFICE O/P EST HI 40 MIN: CPT | Mod: PBBFAC | Performed by: NURSE PRACTITIONER

## 2025-03-10 NOTE — PROGRESS NOTES
Maureen Easley is a 64 y.o. female here for Dysphagia        PCP: Beryl Longo  Referring Provider: Willi Wills Md  [id number] [place] [id number]     HPI:  Presents in follow-up after ER visit for chest discomfort.  Patient presented to the ER on 02/22/2025 due to atypical chest discomfort and reflux that started after eating deer sausage for breakfast and dinner.  Labs from ER record reviewed, HGB 11.3 and HCT 35.8.  Liver enzymes normal.  Lipase was also normal.  Troponin normal.  Patient is taking pantoprazole daily.  Is avoiding spicy food.  Endorses dysphagia with solid foods, worse with meat or bread.  No nausea or vomiting reported.  No previous cholecystectomy. No hematochezia or melena.  She did have a colonoscopy on 10/02/2025, diverticulosis, tubular adenoma removed from the descending colon.  Recommendation to repeat in 5 years.          ROS:  Review of Systems   Constitutional:  Negative for appetite change, fatigue, fever and unexpected weight change.   HENT:  Positive for trouble swallowing.    Respiratory:  Negative for shortness of breath.    Cardiovascular:  Positive for chest pain.   Gastrointestinal:  Positive for reflux. Negative for abdominal pain, blood in stool, change in bowel habit, constipation, diarrhea, nausea, rectal pain and vomiting.   Musculoskeletal:  Negative for gait problem.   Integumentary:  Negative for pallor.   Psychiatric/Behavioral:  The patient is not nervous/anxious.           PMHX:  has a past medical history of Bipolar 2 disorder, Essential hypertension (05/24/2023), Hyperlipidemia, Hypertension, and Type 2 macular telangiectasis, bilateral (08/13/2018).    PSHX:  has a past surgical history that includes Hysterectomy (1998); Knee arthroscopy w/ meniscectomy (Right, 07/11/2024); and Colonoscopy w/ polypectomy (10/02/2024).    PFHX: family history includes Hypertension in an other family  member.    PSlHX:  reports that she has never smoked. She has never used smokeless tobacco. She reports that she does not drink alcohol and does not use drugs.        Review of patient's allergies indicates:   Allergen Reactions    Sulfa (sulfonamide antibiotics)     Sulfamethoxazole-trimethoprim      Other reaction(s): Unknown    Clarithromycin Rash       Medication List with Changes/Refills   Current Medications    ALBUTEROL (PROVENTIL/VENTOLIN HFA) 90 MCG/ACTUATION INHALER    Inhale 2 puffs into the lungs every 4 (four) hours as needed.    ARIPIPRAZOLE (ABILIFY) 10 MG TAB    Take 10 mg by mouth every morning.    ASPIRIN (ECOTRIN) 81 MG EC TABLET    Take 81 mg by mouth nightly.    ATORVASTATIN (LIPITOR) 10 MG TABLET    Take 1 tablet (10 mg total) by mouth every evening.    BENZONATATE (TESSALON) 100 MG CAPSULE    Take 1 capsule (100 mg total) by mouth 3 (three) times daily as needed for Cough.    DULOXETINE (CYMBALTA) 30 MG CAPSULE    Take 30 mg by mouth once daily.    ESTRADIOL (ESTRACE) 0.01 % (0.1 MG/GRAM) VAGINAL CREAM    Place 1 g vaginally twice a week.    FEXOFENADINE (ALLEGRA ALLERGY) 180 MG TABLET    Take 1 tablet (180 mg total) by mouth once daily.    FLUTICASONE PROPIONATE (FLONASE) 50 MCG/ACTUATION NASAL SPRAY    SPRAY 2 SPRAYS INTO EACH NOSTRIL EVERY DAY    GABAPENTIN (NEURONTIN) 300 MG CAPSULE    Take 1 capsule (300 mg total) by mouth 3 (three) times daily.    HYDROCHLOROTHIAZIDE (HYDRODIURIL) 25 MG TABLET    Take 1 tablet (25 mg total) by mouth once daily.    HYDROCODONE-ACETAMINOPHEN (NORCO) 5-325 MG PER TABLET    Take 1 tablet by mouth daily as needed.    LISINOPRIL (PRINIVIL,ZESTRIL) 20 MG TABLET    Take 1 tablet (20 mg total) by mouth once daily.    METRONIDAZOLE (METROGEL) 0.75 % GEL    Apply to face daily    MIRTAZAPINE (REMERON) 15 MG TABLET    Take 15 mg by mouth every evening.    MONTELUKAST (SINGULAIR) 10 MG TABLET    Take 1 tablet (10 mg total) by mouth every evening.    PANTOPRAZOLE  "(PROTONIX) 40 MG TABLET    Take 1 tablet (40 mg total) by mouth once daily.    PRAMIPEXOLE (MIRAPEX) 0.5 MG TABLET    Take 1 tablet (0.5 mg total) by mouth every evening.    TRAZODONE (DESYREL) 100 MG TABLET    TAKE 1 TABLET BY MOUTH EVERY DAY AT BEDTIME AS DIRECTED        Objective Findings:  Vital Signs:  BP (!) 198/95 (BP Location: Left arm, Patient Position: Sitting)   Pulse (!) 113   Ht 5' 6" (1.676 m)   Wt 105.7 kg (233 lb)   SpO2 96%   BMI 37.61 kg/m²  Body mass index is 37.61 kg/m².    Physical Exam:  Physical Exam  Vitals and nursing note reviewed.   Constitutional:       General: She is not in acute distress.     Appearance: Normal appearance.   HENT:      Mouth/Throat:      Mouth: Mucous membranes are moist.   Cardiovascular:      Rate and Rhythm: Tachycardia present.   Pulmonary:      Effort: Pulmonary effort is normal.      Breath sounds: No wheezing, rhonchi or rales.   Abdominal:      General: Bowel sounds are normal. There is no distension.      Palpations: Abdomen is soft. There is no mass.      Tenderness: There is no abdominal tenderness.   Skin:     General: Skin is warm and dry.      Coloration: Skin is not jaundiced or pale.   Neurological:      Mental Status: She is alert and oriented to person, place, and time.   Psychiatric:         Mood and Affect: Mood normal.          Labs:  Lab Results   Component Value Date    WBC 12.76 (H) 02/22/2025    HGB 11.3 (L) 02/22/2025    HCT 35.8 (L) 02/22/2025    MCV 89.5 02/22/2025    RDW 15.9 (H) 02/22/2025     02/22/2025    LYMPH 23.5 (L) 02/22/2025    LYMPH 3.00 02/22/2025    MONO 8.7 (H) 02/22/2025    EOS 0.11 02/22/2025    BASO 0.04 02/22/2025     Lab Results   Component Value Date     (L) 02/22/2025    K 4.5 02/22/2025    CL 99 02/22/2025    CO2 22 (L) 02/22/2025     (H) 02/22/2025    BUN 25 (H) 02/22/2025    CREATININE 0.95 02/22/2025    CALCIUM 8.7 02/22/2025    PROT 6.7 02/22/2025    ALBUMIN 3.3 (L) 02/22/2025    BILITOT 0.7 " "02/22/2025    ALKPHOS 81 02/22/2025    AST 30 02/22/2025    ALT 13 02/22/2025         Imaging: X-Ray Chest 1 View  Result Date: 2/22/2025  EXAMINATION: XR CHEST 1 VIEW CLINICAL HISTORY: Chest pain, unspecified TECHNIQUE: Single frontal view of the chest was performed. COMPARISON: Chest radiograph 08/24/2024, CT abdomen and pelvis 11/29/2016 FINDINGS: Monitoring leads overlie the chest.  Patient is slightly rotated.  Resolution is limited by body habitus with underpenetration. The lungs are well expanded and clear, with normal appearance of pulmonary vasculature and no pleural effusion or pneumothorax. The cardiac silhouette is normal in size. The hilar and mediastinal contours are within normal limits for age noting calcification at the arch. No acute osseous process seen.  PA and lateral views can be obtained.     No radiographic acute intrathoracic process seen on this single view. Electronically signed by: Lorenzo Davidson MD Date:    02/22/2025 Time:    14:22        Assessment:  Maureen Easley is a 64 y.o. female here with:  1. Gastroesophageal reflux disease, unspecified whether esophagitis present    2. Esophageal dysphagia    3. Anemia, unspecified type          Recommendations:  1. May add Pepcid complete at night  2. Abdominal ultrasound  3. Iron studies  4. EGD/Dilation  5. Avoid spicy, greasy foods  Avoid caffeine, citric acid, chocolate, peppermint, and carbonated drinks  Do not lay down within 3 hours of eating  Increase fluid to 64 ounces daily  Avoid antiinflammatory medications such as motrin, advil, aleve, ibuprofen, and BC powder      Portions of this note may have been created with voice recognition software.  Occasional wrong word or "sound a like substitutions may have occurred due to inherent limitations of voice recognition software.  Please read the note carefully and recognize using contexts, where substitutions have occurred.    Diagnosis, risks, benefits, and side effects of any medications " and treatment plan were discussed with the patient.  All questions were answered to the satisfaction of the patient, and patient verbalized understanding and agreement to the treatment plan.        No follow-ups on file.      Order summary:  Orders Placed This Encounter    US Abdomen Complete    Iron and TIBC    Ferritin    EGD w Dilation       Thank you for allowing me to participate in the care of Maureen Ealsey.      Kimber Masters, GAYLE-C

## 2025-03-10 NOTE — PATIENT INSTRUCTIONS
Pepcid Complete one at night    Avoid spicy, greasy foods  Avoid caffeine, citric acid, chocolate, peppermint, and carbonated drinks  Do not lay down within 3 hours of eating  Exercise 150 minutes per week  Increase fluid to 64 ounces daily  Avoid antiinflammatory medications such as motrin, advil, aleve, ibuprofen, and BC powder    Miralax powder 17 grams in 8 ounces of liquid

## 2025-03-12 ENCOUNTER — OFFICE VISIT (OUTPATIENT)
Dept: FAMILY MEDICINE | Facility: CLINIC | Age: 65
End: 2025-03-12
Payer: MEDICARE

## 2025-03-12 VITALS
RESPIRATION RATE: 18 BRPM | TEMPERATURE: 98 F | HEART RATE: 107 BPM | DIASTOLIC BLOOD PRESSURE: 80 MMHG | SYSTOLIC BLOOD PRESSURE: 150 MMHG | HEIGHT: 66 IN | BODY MASS INDEX: 37.45 KG/M2 | WEIGHT: 233 LBS | OXYGEN SATURATION: 97 %

## 2025-03-12 DIAGNOSIS — N39.0 URINARY TRACT INFECTION WITHOUT HEMATURIA, SITE UNSPECIFIED: ICD-10-CM

## 2025-03-12 DIAGNOSIS — N30.00 ACUTE CYSTITIS WITHOUT HEMATURIA: Primary | ICD-10-CM

## 2025-03-12 LAB
BILIRUB SERPL-MCNC: NORMAL MG/DL
BLOOD URINE, POC: NORMAL
CLARITY, UA: NORMAL
COLOR, UA: YELLOW
GLUCOSE UR QL STRIP: NORMAL
KETONES UR QL STRIP: NORMAL
LEUKOCYTE ESTERASE URINE, POC: NORMAL
NITRITE, POC UA: NORMAL
PH, POC UA: 5
PROTEIN, POC: NORMAL
SPECIFIC GRAVITY, POC UA: 1.01
UROBILINOGEN, POC UA: 0.2

## 2025-03-12 PROCEDURE — 1125F AMNT PAIN NOTED PAIN PRSNT: CPT | Mod: ,,, | Performed by: FAMILY MEDICINE

## 2025-03-12 PROCEDURE — 3079F DIAST BP 80-89 MM HG: CPT | Mod: ,,, | Performed by: FAMILY MEDICINE

## 2025-03-12 PROCEDURE — 1159F MED LIST DOCD IN RCRD: CPT | Mod: ,,, | Performed by: FAMILY MEDICINE

## 2025-03-12 PROCEDURE — 4010F ACE/ARB THERAPY RXD/TAKEN: CPT | Mod: ,,, | Performed by: FAMILY MEDICINE

## 2025-03-12 PROCEDURE — 99214 OFFICE O/P EST MOD 30 MIN: CPT | Mod: ,,, | Performed by: FAMILY MEDICINE

## 2025-03-12 PROCEDURE — 3077F SYST BP >= 140 MM HG: CPT | Mod: ,,, | Performed by: FAMILY MEDICINE

## 2025-03-12 PROCEDURE — 3288F FALL RISK ASSESSMENT DOCD: CPT | Mod: ,,, | Performed by: FAMILY MEDICINE

## 2025-03-12 PROCEDURE — 1160F RVW MEDS BY RX/DR IN RCRD: CPT | Mod: ,,, | Performed by: FAMILY MEDICINE

## 2025-03-12 PROCEDURE — 1101F PT FALLS ASSESS-DOCD LE1/YR: CPT | Mod: ,,, | Performed by: FAMILY MEDICINE

## 2025-03-12 PROCEDURE — 3008F BODY MASS INDEX DOCD: CPT | Mod: ,,, | Performed by: FAMILY MEDICINE

## 2025-03-12 RX ORDER — CIPROFLOXACIN 500 MG/1
500 TABLET ORAL EVERY 12 HOURS
Qty: 14 TABLET | Refills: 0 | Status: SHIPPED | OUTPATIENT
Start: 2025-03-12 | End: 2025-03-19

## 2025-03-12 NOTE — PROGRESS NOTES
Subjective:       Patient ID: Maureen Easley is a 64 y.o. female.    Chief Complaint: Urinary Tract Infection (Maureen Easley 64 y.o female present to clinic possible UTI,pt stated yesterday her urine was a pink color. Symptoms been present since yesterday. )    Urinary Tract Infection   Pertinent negatives include no chills, flank pain, frequency, hematuria, nausea, urgency, vomiting, constipation or rash.     Review of Systems   Constitutional:  Negative for activity change, appetite change, chills, diaphoresis, fatigue, fever and unexpected weight change.   HENT:  Negative for nasal congestion, dental problem, drooling, ear discharge, ear pain, facial swelling, hearing loss, mouth sores, nosebleeds, postnasal drip, rhinorrhea, sinus pressure/congestion, sneezing, sore throat, tinnitus, trouble swallowing, voice change and goiter.    Eyes:  Negative for photophobia, discharge, itching and visual disturbance.   Respiratory:  Negative for apnea, cough, choking, chest tightness, shortness of breath, wheezing and stridor.    Cardiovascular:  Negative for chest pain, palpitations, leg swelling and claudication.   Gastrointestinal:  Negative for abdominal distention, abdominal pain, anal bleeding, blood in stool, change in bowel habit, constipation, diarrhea, nausea and vomiting.   Endocrine: Negative for cold intolerance, heat intolerance, polydipsia, polyphagia and polyuria.   Genitourinary:  Positive for dysuria. Negative for bladder incontinence, decreased urine volume, difficulty urinating, enuresis, flank pain, frequency, hematuria, nocturia, pelvic pain and urgency.   Musculoskeletal:  Negative for arthralgias, back pain, gait problem, joint swelling, leg pain, myalgias, neck pain, neck stiffness and joint deformity.   Integumentary:  Negative for pallor, rash, wound, breast mass and breast tenderness.   Allergic/Immunologic: Negative for environmental allergies, food allergies and immunocompromised state.    Neurological:  Negative for dizziness, vertigo, tremors, seizures, syncope, facial asymmetry, speech difficulty, weakness, light-headedness, numbness, headaches, memory loss and coordination difficulties.   Hematological:  Negative for adenopathy. Does not bruise/bleed easily.   Psychiatric/Behavioral:  Negative for agitation, behavioral problems, confusion, decreased concentration, dysphoric mood, hallucinations, self-injury, sleep disturbance and suicidal ideas. The patient is not nervous/anxious and is not hyperactive.    Breast: Negative for mass and tenderness        Objective:      Physical Exam  Vitals reviewed.   Constitutional:       Appearance: Normal appearance.   HENT:      Head: Normocephalic and atraumatic.      Right Ear: Tympanic membrane, ear canal and external ear normal.      Left Ear: Tympanic membrane, ear canal and external ear normal.      Nose: Nose normal.      Mouth/Throat:      Mouth: Mucous membranes are moist.      Pharynx: Oropharynx is clear.   Eyes:      Extraocular Movements: Extraocular movements intact.      Conjunctiva/sclera: Conjunctivae normal.      Pupils: Pupils are equal, round, and reactive to light.   Cardiovascular:      Rate and Rhythm: Normal rate and regular rhythm.      Pulses: Normal pulses.      Heart sounds: Normal heart sounds.   Pulmonary:      Effort: Pulmonary effort is normal.      Breath sounds: Normal breath sounds.   Abdominal:      General: Bowel sounds are normal.      Palpations: Abdomen is soft.   Musculoskeletal:         General: Normal range of motion.      Cervical back: Normal range of motion and neck supple.   Skin:     General: Skin is warm and dry.   Neurological:      General: No focal deficit present.      Mental Status: She is alert. Mental status is at baseline.   Psychiatric:         Mood and Affect: Mood normal.         Behavior: Behavior normal.         Thought Content: Thought content normal.         Judgment: Judgment normal.          Assessment:       1. Acute cystitis without hematuria    2. Urinary tract infection without hematuria, site unspecified        Plan:     Acute cystitis without hematuria    Urinary tract infection without hematuria, site unspecified  -     POCT URINALYSIS W/O SCOPE    Other orders  -     ciprofloxacin HCl (CIPRO) 500 MG tablet; Take 1 tablet (500 mg total) by mouth every 12 (twelve) hours. for 7 days  Dispense: 14 tablet; Refill: 0

## 2025-03-17 ENCOUNTER — TELEPHONE (OUTPATIENT)
Dept: GASTROENTEROLOGY | Facility: CLINIC | Age: 65
End: 2025-03-17
Payer: MEDICARE

## 2025-03-31 ENCOUNTER — ANESTHESIA (OUTPATIENT)
Dept: GASTROENTEROLOGY | Facility: HOSPITAL | Age: 65
End: 2025-03-31
Payer: MEDICARE

## 2025-03-31 ENCOUNTER — HOSPITAL ENCOUNTER (OUTPATIENT)
Dept: GASTROENTEROLOGY | Facility: HOSPITAL | Age: 65
Discharge: HOME | End: 2025-03-31
Attending: NURSE PRACTITIONER | Admitting: INTERNAL MEDICINE
Payer: MEDICARE

## 2025-03-31 ENCOUNTER — ANESTHESIA EVENT (OUTPATIENT)
Dept: GASTROENTEROLOGY | Facility: HOSPITAL | Age: 65
End: 2025-03-31
Payer: MEDICARE

## 2025-03-31 VITALS
HEIGHT: 66 IN | SYSTOLIC BLOOD PRESSURE: 124 MMHG | RESPIRATION RATE: 13 BRPM | DIASTOLIC BLOOD PRESSURE: 67 MMHG | HEART RATE: 78 BPM | OXYGEN SATURATION: 94 % | WEIGHT: 233 LBS | TEMPERATURE: 97 F | BODY MASS INDEX: 37.45 KG/M2

## 2025-03-31 DIAGNOSIS — K31.7 GASTRIC POLYP: ICD-10-CM

## 2025-03-31 DIAGNOSIS — K21.9 GASTROESOPHAGEAL REFLUX DISEASE, UNSPECIFIED WHETHER ESOPHAGITIS PRESENT: ICD-10-CM

## 2025-03-31 DIAGNOSIS — K29.00 ACUTE SUPERFICIAL GASTRITIS WITHOUT HEMORRHAGE: Primary | ICD-10-CM

## 2025-03-31 DIAGNOSIS — R13.19 ESOPHAGEAL DYSPHAGIA: ICD-10-CM

## 2025-03-31 PROCEDURE — 37000008 HC ANESTHESIA 1ST 15 MINUTES

## 2025-03-31 PROCEDURE — 43450 DILATE ESOPHAGUS 1/MULT PASS: CPT | Mod: 51,,, | Performed by: INTERNAL MEDICINE

## 2025-03-31 PROCEDURE — 63600175 PHARM REV CODE 636 W HCPCS: Performed by: NURSE ANESTHETIST, CERTIFIED REGISTERED

## 2025-03-31 PROCEDURE — 43239 EGD BIOPSY SINGLE/MULTIPLE: CPT | Mod: XS | Performed by: INTERNAL MEDICINE

## 2025-03-31 PROCEDURE — 27000284 HC CANNULA NASAL: Performed by: NURSE ANESTHETIST, CERTIFIED REGISTERED

## 2025-03-31 PROCEDURE — 88342 IMHCHEM/IMCYTCHM 1ST ANTB: CPT | Mod: TC,SUR | Performed by: INTERNAL MEDICINE

## 2025-03-31 PROCEDURE — 43239 EGD BIOPSY SINGLE/MULTIPLE: CPT | Mod: XS,,, | Performed by: INTERNAL MEDICINE

## 2025-03-31 PROCEDURE — 43450 DILATE ESOPHAGUS 1/MULT PASS: CPT | Performed by: INTERNAL MEDICINE

## 2025-03-31 PROCEDURE — 43270 EGD LESION ABLATION: CPT | Performed by: INTERNAL MEDICINE

## 2025-03-31 PROCEDURE — 37000009 HC ANESTHESIA EA ADD 15 MINS

## 2025-03-31 PROCEDURE — 43270 EGD LESION ABLATION: CPT | Mod: ,,, | Performed by: INTERNAL MEDICINE

## 2025-03-31 PROCEDURE — 27201423 OPTIME MED/SURG SUP & DEVICES STERILE SUPPLY

## 2025-03-31 PROCEDURE — D9220A PRA ANESTHESIA: Mod: ,,, | Performed by: NURSE ANESTHETIST, CERTIFIED REGISTERED

## 2025-03-31 RX ORDER — SODIUM CHLORIDE, SODIUM LACTATE, POTASSIUM CHLORIDE, CALCIUM CHLORIDE 600; 310; 30; 20 MG/100ML; MG/100ML; MG/100ML; MG/100ML
INJECTION, SOLUTION INTRAVENOUS CONTINUOUS
Status: DISCONTINUED | OUTPATIENT
Start: 2025-03-31 | End: 2025-04-01 | Stop reason: HOSPADM

## 2025-03-31 RX ORDER — LIDOCAINE HYDROCHLORIDE 20 MG/ML
INJECTION, SOLUTION EPIDURAL; INFILTRATION; INTRACAUDAL; PERINEURAL
Status: DISCONTINUED | OUTPATIENT
Start: 2025-03-31 | End: 2025-03-31

## 2025-03-31 RX ORDER — PROPOFOL 10 MG/ML
VIAL (ML) INTRAVENOUS
Status: DISCONTINUED | OUTPATIENT
Start: 2025-03-31 | End: 2025-03-31

## 2025-03-31 RX ORDER — SODIUM CHLORIDE 0.9 % (FLUSH) 0.9 %
10 SYRINGE (ML) INJECTION EVERY 6 HOURS PRN
Status: DISCONTINUED | OUTPATIENT
Start: 2025-03-31 | End: 2025-04-01 | Stop reason: HOSPADM

## 2025-03-31 RX ADMIN — PROPOFOL 50 MG: 10 INJECTION, EMULSION INTRAVENOUS at 09:03

## 2025-03-31 RX ADMIN — PROPOFOL 50 MG: 10 INJECTION, EMULSION INTRAVENOUS at 08:03

## 2025-03-31 RX ADMIN — LIDOCAINE HYDROCHLORIDE 100 MG: 20 INJECTION, SOLUTION EPIDURAL; INFILTRATION; INTRACAUDAL; PERINEURAL at 08:03

## 2025-03-31 NOTE — H&P
[facility] - Endoscopy  Gastroenterology  H&P    Patient Name: Maureen Easley  MRN: [id number]  Admission Date: 3/31/2025  Code Status: Prior    Attending Provider: Kimber Masters FNP   Primary Care Physician: Beryl Longo MD  Principal Problem:<principal problem not specified>    Subjective:     History of Present Illness:  This patient is a 64-year-old female with a esophageal dysphagia and iron-deficiency anemia.  She has had a difficult chest pain.  She presents for EGD plus-minus dilation of the esophagus.    Past Medical History:   Diagnosis Date    Bipolar 2 disorder     Essential hypertension 05/24/2023    Goal BP less than 140/80   Lisinopril 10 mg oral daily      Hyperlipidemia     Hypertension     Type 2 macular telangiectasis, bilateral 08/13/2018       Past Surgical History:   Procedure Laterality Date    COLONOSCOPY W/ POLYPECTOMY  10/02/2024    Dr. Suarez    HYSTERECTOMY  1998    KNEE ARTHROSCOPY W/ MENISCECTOMY Right 07/11/2024    Procedure: ARTHROSCOPY, KNEE, WITH PARTIAL MEDIAL AND LATERAL MENISCECTOMY;  Surgeon: Balaji Mcfarlane MD;  Location: [facility];  Service: Orthopedics;  Laterality: Right;    ROTATOR CUFF REPAIR Right        Review of patient's allergies indicates:   Allergen Reactions    Sulfa (sulfonamide antibiotics)     Sulfamethoxazole-trimethoprim      Other reaction(s): Unknown    Clarithromycin Rash     Family History       Problem Relation (Age of Onset)    Hypertension Other          Tobacco Use    Smoking status: Never    Smokeless tobacco: Never   Substance and Sexual Activity    Alcohol use: Never    Drug use: Never    Sexual activity: Yes     Partners: Male     Birth control/protection: None     Review of Systems   Constitutional: Negative.    HENT:  Positive for trouble swallowing.    Respiratory: Negative.     Cardiovascular: Negative.    Gastrointestinal: Negative.      Objective:     Vital Signs (Most Recent):  Temp: 97.8 °F (36.6 °C) (03/31/25 0806)  Pulse:  "91 (03/31/25 0806)  Resp: 14 (03/31/25 0806)  BP: 135/69 (03/31/25 0806)  SpO2: 95 % (03/31/25 0806) Vital Signs (24h Range):  Temp:  [97.8 °F (36.6 °C)] 97.8 °F (36.6 °C)  Pulse:  [91] 91  Resp:  [14] 14  SpO2:  [95 %] 95 %  BP: (135)/(69) 135/69     Weight: 105.7 kg (233 lb) (03/31/25 0806)  Body mass index is 37.61 kg/m².    No intake or output data in the 24 hours ending 03/31/25 0849    Lines/Drains/Airways       Peripheral Intravenous Line  Duration                  Peripheral IV - Single Lumen 03/31/25 0820 22 G Posterior;Right Hand <1 day                    Physical Exam  Vitals reviewed.   Constitutional:       General: She is not in acute distress.     Appearance: Normal appearance. She is well-developed. She is not ill-appearing.   HENT:      Head: Normocephalic and atraumatic.      Nose: Nose normal.   Eyes:      Pupils: Pupils are equal, round, and reactive to light.   Cardiovascular:      Rate and Rhythm: Normal rate and regular rhythm.   Pulmonary:      Effort: Pulmonary effort is normal.      Breath sounds: Normal breath sounds. No wheezing.   Abdominal:      General: Abdomen is flat. Bowel sounds are normal. There is no distension.      Palpations: Abdomen is soft.      Tenderness: There is no abdominal tenderness. There is no guarding.   Skin:     General: Skin is warm and dry.      Coloration: Skin is not jaundiced.   Neurological:      Mental Status: She is alert.   Psychiatric:         Attention and Perception: Attention normal.         Mood and Affect: Affect normal.         Speech: Speech normal.         Behavior: Behavior is cooperative.      Comments: Pt was calm while speaking.         Significant Labs:  CBC: No results for input(s): "WBC", "HGB", "HCT", "PLT" in the last 48 hours.  CMP: No results for input(s): "GLU", "CALCIUM", "ALBUMIN", "PROT", "NA", "K", "CO2", "CL", "BUN", "CREATININE", "ALKPHOS", "ALT", "AST", "BILITOT" in the last 48 hours.    Significant Imaging:  Imaging results " within the past 24 hours have been reviewed.    Assessment/Plan:     There are no hospital problems to display for this patient.        Impression: Iron-deficiency anemia, esophageal dysphagia, atypical chest pain.  Plan: EGD plus-minus dilation of the esophagus    Ebenezer Suarez MD  Gastroenterology  [facility] - Endoscopy

## 2025-03-31 NOTE — TRANSFER OF CARE
"Anesthesia Transfer of Care Note    Patient: Maureen Easley    Procedure(s) Performed: * No procedures listed *    Patient location: GI    Anesthesia Type: general    Transport from OR: Transported from OR on room air with adequate spontaneous ventilation    Post pain: adequate analgesia    Post assessment: no apparent anesthetic complications    Post vital signs: stable    Level of consciousness: responds to stimulation    Nausea/Vomiting: no nausea/vomiting    Complications: none    Transfer of care protocol was followed      Last vitals: Visit Vitals  BP (!) 101/56 (BP Location: Left arm, Patient Position: Lying)   Pulse 79   Temp 36.1 °C (97 °F) (Oral)   Resp 19   Ht 5' 6" (1.676 m)   Wt 105.7 kg (233 lb)   SpO2 95%   Breastfeeding No   BMI 37.61 kg/m²     "

## 2025-03-31 NOTE — ANESTHESIA POSTPROCEDURE EVALUATION
Anesthesia Post Evaluation    Patient: Maureen Easley    Procedure(s) Performed: * No procedures listed *    Final Anesthesia Type: general      Patient location during evaluation: GI PACU  Patient participation: Yes- Able to Participate  Level of consciousness: responds to stimulation  Post-procedure vital signs: reviewed and stable  Pain management: adequate  Airway patency: patent  OLI mitigation strategies: Multimodal analgesia  PONV status at discharge: No PONV  Anesthetic complications: no      Cardiovascular status: hemodynamically stable  Respiratory status: unassisted, spontaneous ventilation and room air  Hydration status: euvolemic  Follow-up not needed.  Comments: The pt was not arousable even with painful stimulation during the procedure.   Refer to nursing note for pain/aileen score upon discharge from recovery.               Vitals Value Taken Time   /67 03/31/25 09:35   Temp 36.1 °C (97 °F) 03/31/25 09:21   Pulse 76 03/31/25 09:35   Resp 18 03/31/25 09:35   SpO2 94 % 03/31/25 09:35   Vitals shown include unfiled device data.      Event Time   Out of Recovery 09:42:37         Pain/Aileen Score: Aileen Score: 8 (3/31/2025  9:10 AM)

## 2025-03-31 NOTE — ANESTHESIA PREPROCEDURE EVALUATION
03/31/2025  Maureen Easley is a 64 y.o., female.    Social History     Socioeconomic History    Marital status:    Tobacco Use    Smoking status: Never    Smokeless tobacco: Never   Substance and Sexual Activity    Alcohol use: Never    Drug use: Never    Sexual activity: Yes     Partners: Male     Birth control/protection: None     Social Drivers of Health     Financial Resource Strain: Low Risk  (4/17/2024)    Overall Financial Resource Strain (CARDIA)     Difficulty of Paying Living Expenses: Not hard at all   Food Insecurity: No Food Insecurity (4/17/2024)    Hunger Vital Sign     Worried About Running Out of Food in the Last Year: Never true     Ran Out of Food in the Last Year: Never true   Transportation Needs: No Transportation Needs (4/17/2024)    PRAPARE - Transportation     Lack of Transportation (Medical): No     Lack of Transportation (Non-Medical): No   Physical Activity: Insufficiently Active (4/17/2024)    Exercise Vital Sign     Days of Exercise per Week: 1 day     Minutes of Exercise per Session: 10 min   Stress: Stress Concern Present (4/17/2024)    Citizen of [place] [employer] - Occupational Stress Questionnaire     Feeling of Stress : Very much   Housing Stability: Unknown (4/17/2024)    Housing Stability Vital Sign     Unable to Pay for Housing in the Last Year: No      Pre-op Assessment    I have reviewed the Patient Summary Reports.     I have reviewed the Nursing Notes. I have reviewed the NPO Status.   I have reviewed the Medications.     Review of Systems  Anesthesia Hx:  No problems with previous Anesthesia                Social:  Non-Smoker       Cardiovascular:     Hypertension                                          Hepatic/GI:     GERD                Psych:  Psychiatric History                Past Medical History:   Diagnosis Date    Bipolar 2 disorder      Essential hypertension 05/24/2023    Goal BP less than 140/80   Lisinopril 10 mg oral daily      Hyperlipidemia     Hypertension     Type 2 macular telangiectasis, bilateral 08/13/2018      Past Surgical History:   Procedure Laterality Date    COLONOSCOPY W/ POLYPECTOMY  10/02/2024    Dr. Suarez    HYSTERECTOMY  1998    KNEE ARTHROSCOPY W/ MENISCECTOMY Right 07/11/2024    Procedure: ARTHROSCOPY, KNEE, WITH PARTIAL MEDIAL AND LATERAL MENISCECTOMY;  Surgeon: Balaji Mcfarlane MD;  Location: [facility];  Service: Orthopedics;  Laterality: Right;        Physical Exam  General: Well nourished, Cooperative, Alert and Oriented    Airway:  Mallampati: II     Chest/Lungs:  Clear to auscultation    Heart:  Rate: Normal        Anesthesia Plan  Type of Anesthesia, risks & benefits discussed:    Anesthesia Type: Gen Natural Airway, MAC  Intra-op Monitoring Plan: Standard ASA Monitors  Post Op Pain Control Plan: multimodal analgesia and IV/PO Opioids PRN  Induction:  IV  Informed Consent: Informed consent signed with the Patient and all parties understand the risks and agree with anesthesia plan.  All questions answered. Patient consented to blood products? Yes  ASA Score: 3  Day of Surgery Review of History & Physical: I have interviewed and examined the patient. I have reviewed the patient's H&P dated: There are no significant changes.     Ready For Surgery From Anesthesia Perspective.     .

## 2025-03-31 NOTE — DISCHARGE INSTRUCTIONS
Procedure Date  3/31/25     Impression  Overall Impression:   Polyp in the stomach; performed cold forceps biopsy; tissue was ablated with argon plasma coagulation  Performed forceps biopsies in the stomach  Dilated in the esophagus with Banks dilator to 48 Fr ending size  Erythematous mucosa in the fundus of the stomach and antrum; performed cold forceps biopsy  Mucosa of the esophagus was normal-appearing.  The esophagus was dilated with a 48 Malay Banks dilator due to esophageal dysphagia.  The stomach had erythema and biopsies were obtained from the antrum and fundus.  Several hyperplastic appearing polyps were present and 1 fundic polyp was erythematous and ulcerated so it was biopsied and ablated with APC.  The pyloric channel was patent.  The mucosa of the duodenal bulb through 3rd portion was normal-appearing.     Recommendation  Await pathology results, due: 4/1/2025      Disposition: Discharge patient to home in stable condition.  Resume Protonix and other home medications.  No driving until tomorrow.  Follow up pathology results in 2 days.  Repeat dilation of the esophagus p.r.n..  Follow up with PCP as scheduled, return to GI clinic as scheduled or p.r.n..     Indication  Gastroesophageal reflux disease, unspecified whether esophagitis present  Esophageal dysphagia

## 2025-04-02 ENCOUNTER — RESULTS FOLLOW-UP (OUTPATIENT)
Dept: GASTROENTEROLOGY | Facility: CLINIC | Age: 65
End: 2025-04-02

## 2025-04-02 ENCOUNTER — HOSPITAL ENCOUNTER (OUTPATIENT)
Dept: RADIOLOGY | Facility: HOSPITAL | Age: 65
Discharge: HOME | End: 2025-04-02
Attending: NURSE PRACTITIONER
Payer: MEDICARE

## 2025-04-02 ENCOUNTER — OFFICE VISIT (OUTPATIENT)
Dept: ORTHOPEDICS | Facility: CLINIC | Age: 65
End: 2025-04-02
Payer: MEDICARE

## 2025-04-02 ENCOUNTER — TELEPHONE (OUTPATIENT)
Dept: GASTROENTEROLOGY | Facility: CLINIC | Age: 65
End: 2025-04-02
Payer: MEDICARE

## 2025-04-02 VITALS — BODY MASS INDEX: 37.45 KG/M2 | HEIGHT: 66 IN | WEIGHT: 233 LBS

## 2025-04-02 DIAGNOSIS — K21.9 GASTROESOPHAGEAL REFLUX DISEASE, UNSPECIFIED WHETHER ESOPHAGITIS PRESENT: ICD-10-CM

## 2025-04-02 DIAGNOSIS — G89.29 CHRONIC PAIN OF RIGHT KNEE: Primary | ICD-10-CM

## 2025-04-02 DIAGNOSIS — M25.561 CHRONIC PAIN OF RIGHT KNEE: Primary | ICD-10-CM

## 2025-04-02 PROCEDURE — 3008F BODY MASS INDEX DOCD: CPT | Mod: CPTII,,,

## 2025-04-02 PROCEDURE — 20610 DRAIN/INJ JOINT/BURSA W/O US: CPT | Mod: PBBFAC,RT

## 2025-04-02 PROCEDURE — 76700 US EXAM ABDOM COMPLETE: CPT | Mod: TC

## 2025-04-02 PROCEDURE — 99999 PR PBB SHADOW E&M-EST. PATIENT-LVL II: CPT | Mod: PBBFAC,,,

## 2025-04-02 PROCEDURE — 99213 OFFICE O/P EST LOW 20 MIN: CPT | Mod: S$PBB,25,,

## 2025-04-02 PROCEDURE — 4010F ACE/ARB THERAPY RXD/TAKEN: CPT | Mod: CPTII,,,

## 2025-04-02 PROCEDURE — 99999PBSHW PR PBB SHADOW TECHNICAL ONLY FILED TO HB: Mod: PBBFAC,,,

## 2025-04-02 PROCEDURE — 99212 OFFICE O/P EST SF 10 MIN: CPT | Mod: PBBFAC,25

## 2025-04-02 PROCEDURE — 76700 US EXAM ABDOM COMPLETE: CPT | Mod: 26,,, | Performed by: RADIOLOGY

## 2025-04-02 RX ADMIN — BUPIVACAINE HYDROCHLORIDE 1 ML: 2.5 INJECTION, SOLUTION EPIDURAL; INFILTRATION; INTRACAUDAL; PERINEURAL at 11:04

## 2025-04-02 RX ADMIN — TRIAMCINOLONE ACETONIDE 40 MG: 40 INJECTION, SUSPENSION INTRA-ARTICULAR; INTRAMUSCULAR at 11:04

## 2025-04-02 NOTE — TELEPHONE ENCOUNTER
Attempted to call results. Left message.               ----- Message from Ebenezer Suarez MD sent at 4/2/2025  9:29 AM CDT -----  EGD biopsy showed minimal gastritis and an ulcerated hyperplastic polyp.  Recommendation:  Avoid nonsteroidal anti-inflammatories, continue Protonix, repeat esophageal dilation as needed.  ----- Message -----  From: Lab, Background User  Sent: 4/1/2025   2:45 PM CDT  To: Ebenezer Suarez MD

## 2025-04-03 DIAGNOSIS — K80.80 BILIARY CALCULUS OF OTHER SITE WITHOUT OBSTRUCTION: Primary | ICD-10-CM

## 2025-04-03 RX ORDER — BUPIVACAINE HYDROCHLORIDE 2.5 MG/ML
1 INJECTION, SOLUTION EPIDURAL; INFILTRATION; INTRACAUDAL; PERINEURAL
Status: DISCONTINUED | OUTPATIENT
Start: 2025-04-02 | End: 2025-04-03 | Stop reason: HOSPADM

## 2025-04-03 RX ORDER — TRIAMCINOLONE ACETONIDE 40 MG/ML
40 INJECTION, SUSPENSION INTRA-ARTICULAR; INTRAMUSCULAR
Status: DISCONTINUED | OUTPATIENT
Start: 2025-04-02 | End: 2025-04-03 | Stop reason: HOSPADM

## 2025-04-03 NOTE — PROGRESS NOTES
CC:   Chief Complaint   Patient presents with    Right Knee - Pain        Maureen Easley is a 64 y.o. female seen today for follow up of Pain of the Right Knee  Patient presents for 1 month history of worsening right knee pain.  She has had previous right knee arthroscopy by Dr. Mcfarlane in July of 2024.  Now 9 months postop.  Patient reports that she recently did a lot of walking on vacation in [place] when her knee started to aggravate her.  She denies any fall or injury or twisting injury to her knee.  No other complaints today.        PAST MEDICAL HISTORY:   Past Medical History:   Diagnosis Date    Bipolar 2 disorder     Essential hypertension 05/24/2023    Goal BP less than 140/80   Lisinopril 10 mg oral daily      Hyperlipidemia     Hypertension     Type 2 macular telangiectasis, bilateral 08/13/2018        PAST SURGICAL HISTORY:   Past Surgical History:   Procedure Laterality Date    COLONOSCOPY W/ POLYPECTOMY  10/02/2024    Dr. Suarez    HYSTERECTOMY  1998    KNEE ARTHROSCOPY W/ MENISCECTOMY Right 07/11/2024    Procedure: ARTHROSCOPY, KNEE, WITH PARTIAL MEDIAL AND LATERAL MENISCECTOMY;  Surgeon: Balaji Mcfarlane MD;  Location: [facility];  Service: Orthopedics;  Laterality: Right;    ROTATOR CUFF REPAIR Right         ALLERGIES:   Review of patient's allergies indicates:   Allergen Reactions    Sulfa (sulfonamide antibiotics)     Sulfamethoxazole-trimethoprim      Other reaction(s): Unknown    Clarithromycin Rash        MEDICATIONS :  Current Medications[1]     SOCIAL HISTORY: Social History[2]     FAMILY HISTORY:   Family History   Problem Relation Name Age of Onset    Hypertension Other      Breast cancer Neg Hx      Colon cancer Neg Hx      Ovarian cancer Neg Hx            PHYSICAL EXAM:      There were no vitals filed for this visit.  Body mass index is 37.61 kg/m².    GENERAL: Well-developed, well-nourished female . The patient is alert, oriented and cooperative.    EXTREMITIES:   Right knee tender to palpation along medial joint line, range of motion from 0-120 degrees, knee feels stable to varus and valgus stress testing, neurovascularly intact      RADIOGRAPHIC FINDINGS:   None today    Patient Active Problem List    Diagnosis Date Noted    Acute superficial gastritis without hemorrhage 03/31/2025    Gastric polyp 03/31/2025    Gastroesophageal reflux disease 03/10/2025    Esophageal dysphagia 03/10/2025    Anemia 03/10/2025    Essential hypertension 05/24/2023       IMPRESSION AND PLAN:  Worsening right knee pain despite rest and over-the-counter medications.  History of right knee arthroscopy.  Discussed all treatment options with the patient today.  Discussed possible intra-articular steroid injection of the right knee, patient agreed, see procedural note for details.  Follow up with either me or Dr. Mcfarlane in 3 months.      No follow-ups on file.       Kalina Stroud PA-C      (Subject to voice recognition error, transcription service not allowed)         [1]   Current Outpatient Medications:     albuterol (PROVENTIL/VENTOLIN HFA) 90 mcg/actuation inhaler, Inhale 2 puffs into the lungs every 4 (four) hours as needed., Disp: , Rfl:     ARIPiprazole (ABILIFY) 10 MG Tab, Take 10 mg by mouth every morning., Disp: , Rfl:     aspirin (ECOTRIN) 81 MG EC tablet, Take 81 mg by mouth nightly., Disp: , Rfl:     atorvastatin (LIPITOR) 10 MG tablet, Take 1 tablet (10 mg total) by mouth every evening., Disp: 90 tablet, Rfl: 1    benzonatate (TESSALON) 100 MG capsule, Take 1 capsule (100 mg total) by mouth 3 (three) times daily as needed for Cough., Disp: 20 capsule, Rfl: 0    DULoxetine (CYMBALTA) 30 MG capsule, Take 30 mg by mouth once daily., Disp: , Rfl:     estradioL (ESTRACE) 0.01 % (0.1 mg/gram) vaginal cream, Place 1 g vaginally twice a week., Disp: 42.5 g, Rfl: 11    fexofenadine (ALLEGRA ALLERGY) 180 MG tablet, Take 1 tablet (180 mg total) by mouth once daily., Disp: 90 tablet, Rfl: 1     fluticasone propionate (FLONASE) 50 mcg/actuation nasal spray, SPRAY 2 SPRAYS INTO EACH NOSTRIL EVERY DAY, Disp: 48 mL, Rfl: 0    gabapentin (NEURONTIN) 300 MG capsule, Take 1 capsule (300 mg total) by mouth 3 (three) times daily., Disp: 90 capsule, Rfl: 11    hydroCHLOROthiazide (HYDRODIURIL) 25 MG tablet, Take 1 tablet (25 mg total) by mouth once daily., Disp: 90 tablet, Rfl: 1    HYDROcodone-acetaminophen (NORCO) 5-325 mg per tablet, Take 1 tablet by mouth daily as needed., Disp: , Rfl:     lisinopriL (PRINIVIL,ZESTRIL) 20 MG tablet, Take 1 tablet (20 mg total) by mouth once daily., Disp: 90 tablet, Rfl: 1    metroNIDAZOLE (METROGEL) 0.75 % gel, Apply to face daily, Disp: 45 g, Rfl: 5    mirtazapine (REMERON) 15 MG tablet, Take 15 mg by mouth every evening., Disp: , Rfl:     montelukast (SINGULAIR) 10 mg tablet, Take 1 tablet (10 mg total) by mouth every evening., Disp: 90 tablet, Rfl: 1    pantoprazole (PROTONIX) 40 MG tablet, Take 1 tablet (40 mg total) by mouth once daily., Disp: 30 tablet, Rfl: 1    pramipexole (MIRAPEX) 0.5 MG tablet, Take 1 tablet (0.5 mg total) by mouth every evening., Disp: 90 tablet, Rfl: 1    traZODone (DESYREL) 100 MG tablet, TAKE 1 TABLET BY MOUTH EVERY DAY AT BEDTIME AS DIRECTED, Disp: , Rfl:   [2]   Social History  Socioeconomic History    Marital status:    Tobacco Use    Smoking status: Never    Smokeless tobacco: Never   Substance and Sexual Activity    Alcohol use: Never    Drug use: Never    Sexual activity: Yes     Partners: Male     Birth control/protection: None     Social Drivers of Health     Financial Resource Strain: Low Risk  (4/17/2024)    Overall Financial Resource Strain (CARDIA)     Difficulty of Paying Living Expenses: Not hard at all   Food Insecurity: No Food Insecurity (4/17/2024)    Hunger Vital Sign     Worried About Running Out of Food in the Last Year: Never true     Ran Out of Food in the Last Year: Never true   Transportation Needs: No  Transportation Needs (4/17/2024)    PRAPARE - Transportation     Lack of Transportation (Medical): No     Lack of Transportation (Non-Medical): No   Physical Activity: Insufficiently Active (4/17/2024)    Exercise Vital Sign     Days of Exercise per Week: 1 day     Minutes of Exercise per Session: 10 min   Stress: Stress Concern Present (4/17/2024)    [place] [employer] - Occupational Stress Questionnaire     Feeling of Stress : Very much   Housing Stability: Unknown (4/17/2024)    Housing Stability Vital Sign     Unable to Pay for Housing in the Last Year: No

## 2025-04-07 ENCOUNTER — OFFICE VISIT (OUTPATIENT)
Dept: SURGERY | Facility: CLINIC | Age: 65
End: 2025-04-07
Attending: SURGERY
Payer: MEDICARE

## 2025-04-07 VITALS — WEIGHT: 233 LBS | HEIGHT: 66 IN | BODY MASS INDEX: 37.45 KG/M2

## 2025-04-07 DIAGNOSIS — K80.00 CALCULUS OF GALLBLADDER WITH ACUTE CHOLECYSTITIS WITHOUT OBSTRUCTION: Primary | ICD-10-CM

## 2025-04-07 DIAGNOSIS — K80.20 SYMPTOMATIC CHOLELITHIASIS: ICD-10-CM

## 2025-04-07 DIAGNOSIS — Z01.818 PRE-PROCEDURAL EXAMINATION: ICD-10-CM

## 2025-04-07 PROCEDURE — 1159F MED LIST DOCD IN RCRD: CPT | Mod: CPTII,,, | Performed by: SURGERY

## 2025-04-07 PROCEDURE — 3008F BODY MASS INDEX DOCD: CPT | Mod: CPTII,,, | Performed by: SURGERY

## 2025-04-07 PROCEDURE — 99215 OFFICE O/P EST HI 40 MIN: CPT | Mod: PBBFAC | Performed by: SURGERY

## 2025-04-07 PROCEDURE — 3288F FALL RISK ASSESSMENT DOCD: CPT | Mod: CPTII,,, | Performed by: SURGERY

## 2025-04-07 PROCEDURE — 4010F ACE/ARB THERAPY RXD/TAKEN: CPT | Mod: CPTII,,, | Performed by: SURGERY

## 2025-04-07 PROCEDURE — 99999 PR PBB SHADOW E&M-EST. PATIENT-LVL V: CPT | Mod: PBBFAC,,, | Performed by: SURGERY

## 2025-04-07 PROCEDURE — 1101F PT FALLS ASSESS-DOCD LE1/YR: CPT | Mod: CPTII,,, | Performed by: SURGERY

## 2025-04-07 PROCEDURE — 99202 OFFICE O/P NEW SF 15 MIN: CPT | Mod: S$PBB,,, | Performed by: SURGERY

## 2025-04-07 RX ORDER — SODIUM CHLORIDE 9 MG/ML
INJECTION, SOLUTION INTRAVENOUS CONTINUOUS
Status: CANCELLED | OUTPATIENT
Start: 2025-04-07

## 2025-04-07 RX ORDER — CEFAZOLIN SODIUM 2 G/50ML
2 SOLUTION INTRAVENOUS
Status: CANCELLED | OUTPATIENT
Start: 2025-04-07

## 2025-04-07 NOTE — H&P (VIEW-ONLY)
General Surgery History and Physical      Patient ID: Maureen Easley is a 64 y.o. female.    Chief Complaint: Cholelithiasis      HPI:  64-year-old female who for the last couple of months has been having epigastric/right upper quadrant postprandial pain especially with site spicy foods.  She has some nausea and diarrhea associated with no actual emesis yet.  She states it over the weekend she had another flare-up after some catch up and she has been hurting since then.  Still nauseous now and  in the right upper quadrant.  Workup including an EGD was significant only for gallstones.  Strong family history of gallbladder disease.    Review of Systems   Constitutional:  Negative for activity change, appetite change, fatigue and fever.   HENT:  Negative for trouble swallowing.    Respiratory:  Negative for cough and shortness of breath.    Cardiovascular:  Negative for chest pain and palpitations.   Gastrointestinal:  Positive for abdominal pain, diarrhea and nausea. Negative for abdominal distention, blood in stool and constipation.   Genitourinary:  Negative for flank pain.   Musculoskeletal:  Negative for neck pain and neck stiffness.   Neurological:  Negative for weakness.       Current Medications[1]    Review of patient's allergies indicates:   Allergen Reactions    Sulfa (sulfonamide antibiotics)     Sulfamethoxazole-trimethoprim      Other reaction(s): Unknown    Clarithromycin Rash       Past Medical History:   Diagnosis Date    Bipolar 2 disorder     Essential hypertension 05/24/2023    Goal BP less than 140/80   Lisinopril 10 mg oral daily      Hyperlipidemia     Hypertension     Type 2 macular telangiectasis, bilateral 08/13/2018       Past Surgical History:   Procedure Laterality Date    COLONOSCOPY W/ POLYPECTOMY  10/02/2024    Dr. Suarez    HYSTERECTOMY  1998    KNEE ARTHROSCOPY W/ MENISCECTOMY  Right 07/11/2024    Procedure: ARTHROSCOPY, KNEE, WITH PARTIAL MEDIAL AND LATERAL MENISCECTOMY;  Surgeon: Balaji Mcfarlane MD;  Location: [facility];  Service: Orthopedics;  Laterality: Right;    ROTATOR CUFF REPAIR Right        Family History   Problem Relation Name Age of Onset    Hypertension Other      Breast cancer Neg Hx      Colon cancer Neg Hx      Ovarian cancer Neg Hx         Social History[2]    There were no vitals filed for this visit.    Physical Exam  Constitutional:       General: She is not in acute distress.  HENT:      Head: Normocephalic.   Cardiovascular:      Rate and Rhythm: Normal rate and regular rhythm.      Pulses: Normal pulses.   Pulmonary:      Effort: Pulmonary effort is normal. No respiratory distress.      Breath sounds: Normal breath sounds.   Abdominal:      General: Abdomen is flat. There is no distension.      Palpations: Abdomen is soft.      Tenderness: There is abdominal tenderness.   Musculoskeletal:         General: Normal range of motion.   Skin:     General: Skin is warm.   Neurological:      General: No focal deficit present.      Mental Status: She is oriented to person, place, and time.     Narrative & Impression  EXAMINATION:  US ABDOMEN COMPLETE     CLINICAL HISTORY:  Gastro-esophageal reflux disease without esophagitis     TECHNIQUE:  Complete abdominal ultrasound (including pancreas, aorta, liver, gallbladder, common bile duct, IVC, kidneys, and spleen) was performed.     COMPARISON:  Ultrasound 11/29/2016.     FINDINGS:  Pancreas: The visualized portions of pancreas appear normal.     Aorta: No aneurysm.     Liver: 18.1 cm, enlarged.  Diffusely increased parenchymal echogenicity consistent with steatosis. No focal lesions.     Gallbladder: 0.5 cm mobile gallstone.  No gallbladder wall thickening or pericholecystic fluid.  Negative sonographic Flores's sign.     Biliary system: 3 mm common bile duct.  No intrahepatic ductal dilatation.     Inferior vena  cava: Normal in appearance.     Right kidney: 12.3 cm. No hydronephrosis.     Left kidney: 11.3 cm. No hydronephrosis.     Spleen: 9.7 x 5.1 cm.  Normal in size with homogeneous echotexture.     Miscellaneous: No ascites.     Impression:     1. Hepatomegaly with steatosis.  2. Cholelithiasis.        Electronically signed by:Edy Izaguirre MD  Date:                                            04/02/2025  Time:                                           10:01    Assessment & Plan:    Calculus of gallbladder with acute cholecystitis without obstruction    Pre-procedural examination  -     CBC Auto Differential; Future; Expected date: 04/07/2025  -     Comprehensive Metabolic Panel; Future; Expected date: 04/07/2025    Symptomatic cholelithiasis        Patient will need to go to the operating room for laparoscopic cholecystectomy in 2 days' time.  Relatively urgent that she is still having some pain and nausea and poor p.o. intake.  Risks and benefits explained to the patient clear risk of bleeding, infection, open operation, injury to surrounding organs, possible retained stone, possible need for additional operations or procedures.  All questions were answered.          [1]   Current Outpatient Medications   Medication Sig Dispense Refill    albuterol (PROVENTIL/VENTOLIN HFA) 90 mcg/actuation inhaler Inhale 2 puffs into the lungs every 4 (four) hours as needed.      ARIPiprazole (ABILIFY) 10 MG Tab Take 10 mg by mouth every morning.      aspirin (ECOTRIN) 81 MG EC tablet Take 81 mg by mouth nightly.      atorvastatin (LIPITOR) 10 MG tablet Take 1 tablet (10 mg total) by mouth every evening. 90 tablet 1    DULoxetine (CYMBALTA) 30 MG capsule Take 30 mg by mouth once daily.      estradioL (ESTRACE) 0.01 % (0.1 mg/gram) vaginal cream Place 1 g vaginally twice a week. 42.5 g 11    fexofenadine (ALLEGRA ALLERGY) 180 MG tablet Take 1 tablet (180 mg total) by mouth once daily. 90 tablet 1    fluticasone propionate (FLONASE)  50 mcg/actuation nasal spray SPRAY 2 SPRAYS INTO EACH NOSTRIL EVERY DAY 48 mL 0    gabapentin (NEURONTIN) 300 MG capsule Take 1 capsule (300 mg total) by mouth 3 (three) times daily. 90 capsule 11    hydroCHLOROthiazide (HYDRODIURIL) 25 MG tablet Take 1 tablet (25 mg total) by mouth once daily. 90 tablet 1    HYDROcodone-acetaminophen (NORCO) 5-325 mg per tablet Take 1 tablet by mouth daily as needed.      lisinopriL (PRINIVIL,ZESTRIL) 20 MG tablet Take 1 tablet (20 mg total) by mouth once daily. 90 tablet 1    metroNIDAZOLE (METROGEL) 0.75 % gel Apply to face daily 45 g 5    mirtazapine (REMERON) 15 MG tablet Take 15 mg by mouth every evening.      montelukast (SINGULAIR) 10 mg tablet Take 1 tablet (10 mg total) by mouth every evening. 90 tablet 1    pantoprazole (PROTONIX) 40 MG tablet Take 1 tablet (40 mg total) by mouth once daily. 30 tablet 1    pramipexole (MIRAPEX) 0.5 MG tablet Take 1 tablet (0.5 mg total) by mouth every evening. 90 tablet 1    traZODone (DESYREL) 100 MG tablet TAKE 1 TABLET BY MOUTH EVERY DAY AT BEDTIME AS DIRECTED      benzonatate (TESSALON) 100 MG capsule Take 1 capsule (100 mg total) by mouth 3 (three) times daily as needed for Cough. 20 capsule 0     No current facility-administered medications for this visit.   [2]   Social History  Socioeconomic History    Marital status:    Tobacco Use    Smoking status: Never    Smokeless tobacco: Never   Substance and Sexual Activity    Alcohol use: Never    Drug use: Never    Sexual activity: Yes     Partners: Male     Birth control/protection: None     Social Drivers of Health     Financial Resource Strain: Low Risk  (4/17/2024)    Overall Financial Resource Strain (CARDIA)     Difficulty of Paying Living Expenses: Not hard at all   Food Insecurity: No Food Insecurity (4/17/2024)    Hunger Vital Sign     Worried About Running Out of Food in the Last Year: Never true     Ran Out of Food in the Last Year: Never true   Transportation Needs:  No Transportation Needs (4/17/2024)    PRAPARE - Transportation     Lack of Transportation (Medical): No     Lack of Transportation (Non-Medical): No   Physical Activity: Insufficiently Active (4/17/2024)    Exercise Vital Sign     Days of Exercise per Week: 1 day     Minutes of Exercise per Session: 10 min   Stress: Stress Concern Present (4/17/2024)    [employer] - Occupational Stress Questionnaire     Feeling of Stress : Very much   Housing Stability: Unknown (4/17/2024)    Housing Stability Vital Sign     Unable to Pay for Housing in the Last Year: No

## 2025-04-07 NOTE — PROGRESS NOTES
General Surgery History and Physical      Patient ID: Maureen Easley is a 64 y.o. female.    Chief Complaint: Cholelithiasis      HPI:  64-year-old female who for the last couple of months has been having epigastric/right upper quadrant postprandial pain especially with site spicy foods.  She has some nausea and diarrhea associated with no actual emesis yet.  She states it over the weekend she had another flare-up after some catch up and she has been hurting since then.  Still nauseous now and  in the right upper quadrant.  Workup including an EGD was significant only for gallstones.  Strong family history of gallbladder disease.    Review of Systems   Constitutional:  Negative for activity change, appetite change, fatigue and fever.   HENT:  Negative for trouble swallowing.    Respiratory:  Negative for cough and shortness of breath.    Cardiovascular:  Negative for chest pain and palpitations.   Gastrointestinal:  Positive for abdominal pain, diarrhea and nausea. Negative for abdominal distention, blood in stool and constipation.   Genitourinary:  Negative for flank pain.   Musculoskeletal:  Negative for neck pain and neck stiffness.   Neurological:  Negative for weakness.       Current Medications[1]    Review of patient's allergies indicates:   Allergen Reactions    Sulfa (sulfonamide antibiotics)     Sulfamethoxazole-trimethoprim      Other reaction(s): Unknown    Clarithromycin Rash       Past Medical History:   Diagnosis Date    Bipolar 2 disorder     Essential hypertension 05/24/2023    Goal BP less than 140/80   Lisinopril 10 mg oral daily      Hyperlipidemia     Hypertension     Type 2 macular telangiectasis, bilateral 08/13/2018       Past Surgical History:   Procedure Laterality Date    COLONOSCOPY W/ POLYPECTOMY  10/02/2024    Dr. Suarez    HYSTERECTOMY  1998    KNEE ARTHROSCOPY W/ MENISCECTOMY  Right 07/11/2024    Procedure: ARTHROSCOPY, KNEE, WITH PARTIAL MEDIAL AND LATERAL MENISCECTOMY;  Surgeon: Balaji Mcfarlane MD;  Location: [facility];  Service: Orthopedics;  Laterality: Right;    ROTATOR CUFF REPAIR Right        Family History   Problem Relation Name Age of Onset    Hypertension Other      Breast cancer Neg Hx      Colon cancer Neg Hx      Ovarian cancer Neg Hx         Social History[2]    There were no vitals filed for this visit.    Physical Exam  Constitutional:       General: She is not in acute distress.  HENT:      Head: Normocephalic.   Cardiovascular:      Rate and Rhythm: Normal rate and regular rhythm.      Pulses: Normal pulses.   Pulmonary:      Effort: Pulmonary effort is normal. No respiratory distress.      Breath sounds: Normal breath sounds.   Abdominal:      General: Abdomen is flat. There is no distension.      Palpations: Abdomen is soft.      Tenderness: There is abdominal tenderness.   Musculoskeletal:         General: Normal range of motion.   Skin:     General: Skin is warm.   Neurological:      General: No focal deficit present.      Mental Status: She is oriented to person, place, and time.     Narrative & Impression  EXAMINATION:  US ABDOMEN COMPLETE     CLINICAL HISTORY:  Gastro-esophageal reflux disease without esophagitis     TECHNIQUE:  Complete abdominal ultrasound (including pancreas, aorta, liver, gallbladder, common bile duct, IVC, kidneys, and spleen) was performed.     COMPARISON:  Ultrasound 11/29/2016.     FINDINGS:  Pancreas: The visualized portions of pancreas appear normal.     Aorta: No aneurysm.     Liver: 18.1 cm, enlarged.  Diffusely increased parenchymal echogenicity consistent with steatosis. No focal lesions.     Gallbladder: 0.5 cm mobile gallstone.  No gallbladder wall thickening or pericholecystic fluid.  Negative sonographic Flores's sign.     Biliary system: 3 mm common bile duct.  No intrahepatic ductal dilatation.     Inferior vena  cava: Normal in appearance.     Right kidney: 12.3 cm. No hydronephrosis.     Left kidney: 11.3 cm. No hydronephrosis.     Spleen: 9.7 x 5.1 cm.  Normal in size with homogeneous echotexture.     Miscellaneous: No ascites.     Impression:     1. Hepatomegaly with steatosis.  2. Cholelithiasis.        Electronically signed by:Edy Izaguirre MD  Date:                                            04/02/2025  Time:                                           10:01    Assessment & Plan:    Calculus of gallbladder with acute cholecystitis without obstruction    Pre-procedural examination  -     CBC Auto Differential; Future; Expected date: 04/07/2025  -     Comprehensive Metabolic Panel; Future; Expected date: 04/07/2025    Symptomatic cholelithiasis        Patient will need to go to the operating room for laparoscopic cholecystectomy in 2 days' time.  Relatively urgent that she is still having some pain and nausea and poor p.o. intake.  Risks and benefits explained to the patient clear risk of bleeding, infection, open operation, injury to surrounding organs, possible retained stone, possible need for additional operations or procedures.  All questions were answered.          [1]   Current Outpatient Medications   Medication Sig Dispense Refill    albuterol (PROVENTIL/VENTOLIN HFA) 90 mcg/actuation inhaler Inhale 2 puffs into the lungs every 4 (four) hours as needed.      ARIPiprazole (ABILIFY) 10 MG Tab Take 10 mg by mouth every morning.      aspirin (ECOTRIN) 81 MG EC tablet Take 81 mg by mouth nightly.      atorvastatin (LIPITOR) 10 MG tablet Take 1 tablet (10 mg total) by mouth every evening. 90 tablet 1    DULoxetine (CYMBALTA) 30 MG capsule Take 30 mg by mouth once daily.      estradioL (ESTRACE) 0.01 % (0.1 mg/gram) vaginal cream Place 1 g vaginally twice a week. 42.5 g 11    fexofenadine (ALLEGRA ALLERGY) 180 MG tablet Take 1 tablet (180 mg total) by mouth once daily. 90 tablet 1    fluticasone propionate (FLONASE)  50 mcg/actuation nasal spray SPRAY 2 SPRAYS INTO EACH NOSTRIL EVERY DAY 48 mL 0    gabapentin (NEURONTIN) 300 MG capsule Take 1 capsule (300 mg total) by mouth 3 (three) times daily. 90 capsule 11    hydroCHLOROthiazide (HYDRODIURIL) 25 MG tablet Take 1 tablet (25 mg total) by mouth once daily. 90 tablet 1    HYDROcodone-acetaminophen (NORCO) 5-325 mg per tablet Take 1 tablet by mouth daily as needed.      lisinopriL (PRINIVIL,ZESTRIL) 20 MG tablet Take 1 tablet (20 mg total) by mouth once daily. 90 tablet 1    metroNIDAZOLE (METROGEL) 0.75 % gel Apply to face daily 45 g 5    mirtazapine (REMERON) 15 MG tablet Take 15 mg by mouth every evening.      montelukast (SINGULAIR) 10 mg tablet Take 1 tablet (10 mg total) by mouth every evening. 90 tablet 1    pantoprazole (PROTONIX) 40 MG tablet Take 1 tablet (40 mg total) by mouth once daily. 30 tablet 1    pramipexole (MIRAPEX) 0.5 MG tablet Take 1 tablet (0.5 mg total) by mouth every evening. 90 tablet 1    traZODone (DESYREL) 100 MG tablet TAKE 1 TABLET BY MOUTH EVERY DAY AT BEDTIME AS DIRECTED      benzonatate (TESSALON) 100 MG capsule Take 1 capsule (100 mg total) by mouth 3 (three) times daily as needed for Cough. 20 capsule 0     No current facility-administered medications for this visit.   [2]   Social History  Socioeconomic History    Marital status:    Tobacco Use    Smoking status: Never    Smokeless tobacco: Never   Substance and Sexual Activity    Alcohol use: Never    Drug use: Never    Sexual activity: Yes     Partners: Male     Birth control/protection: None     Social Drivers of Health     Financial Resource Strain: Low Risk  (4/17/2024)    Overall Financial Resource Strain (CARDIA)     Difficulty of Paying Living Expenses: Not hard at all   Food Insecurity: No Food Insecurity (4/17/2024)    Hunger Vital Sign     Worried About Running Out of Food in the Last Year: Never true     Ran Out of Food in the Last Year: Never true   Transportation Needs:  No Transportation Needs (4/17/2024)    PRAPARE - Transportation     Lack of Transportation (Medical): No     Lack of Transportation (Non-Medical): No   Physical Activity: Insufficiently Active (4/17/2024)    Exercise Vital Sign     Days of Exercise per Week: 1 day     Minutes of Exercise per Session: 10 min   Stress: Stress Concern Present (4/17/2024)    [employer] - Occupational Stress Questionnaire     Feeling of Stress : Very much   Housing Stability: Unknown (4/17/2024)    Housing Stability Vital Sign     Unable to Pay for Housing in the Last Year: No

## 2025-04-07 NOTE — PATIENT INSTRUCTIONS
[facility]  Instructions for surgery        Your surgery is scheduled for Wednesday 4/9/2025 at [facility] on the 1st floor of [facility].Your arrival time is 7 a.m   You will be notified the day before  surgery verifying your arrival time for surgery.    Your preop lab work will be done today. Lab is on the 1st floor of the clinic.                                                                                                                                                                                                                                                                                                                                                                         Day of Surgery Instructions      Bring a list of all your medications with you the day of your surgery. You can also give the list to your doctor or nurse during your final clinic appointment before surgery.      Do not eat any solid foods or drink any liquids after 12:00 AM (midnight). This includes gum, hard candy, mints, and chewing tobacco.  Medications: Take any medications specified with a small sip of water the morning of your surgery.  Brush your teeth: You may brush your teeth and rinse your mouth. Do not swallow any water or toothpaste.  Clothing: A button front shirt and loose-fitting clothes are the most comfortable before and after surgery. We also recommend low-heeled shoes.  Hair: Avoid buns, ponytails, or hairpieces at the back of the head. Remove or avoid any clips, pins or bands that bind hair. Do not use hairspray. Before going to surgery, you will need to remove any wigs or hairpieces.  We will cover your hair during surgery. Your privacy regarding personal appearance will be respected.  Fingernails: Please be sure to remove all nail polish before you arrive for surgery. We understand that tips, wraps, gels, etc., are expensive; however, we ask these  products to be removed from at least one finger on each hand. Your fingertips are used to accurately monitor your oxygen level during surgery by a device called an oximeter.  Glasses and Contact Lenses: Wear glasses when possible. If contact lenses must be worn, bring a lens case and solution. If glasses are worn, bring a case for them.  Hearing Aids: If you rely on a hearing aid, wear it to the hospital on the day of surgery. This will ensure you can hear and understand everything we need to communicate with you.  Valuables: Jewelry, including body piercings, Dentures, money, and credit cards should be left at home. [facility] is not responsible for valuables that are not secured in our surgery center.  Makeup, Perfume, Creams, Lotions and Deodorants: Do not use any of these products on the day of surgery. Remove false eyelashes prior to surgery.  Implanted Medical Devices: If you have an implanted device, such as a pacemaker or AICD, bring the device information card (if you have it) with you.  Medical Equipment: If you have been fitted for a brace to wear after surgery or you have been given crutches, bring those with you to the surgery center.  Ankle Monitor: Ankle monitors MUST be removed prior to surgery.  Shower: Take a shower with Hibiclens® (chlorhexidine) (available over the counter). This reduces the chance of infection. PLEASE USE CHLORHEXIDINE WASH THE NIGHT BEFORE SURGERY AND THE MORNING OF SURGERY.  ONLY 2 VISITORS ARE ALLOWED WITH YOU ON DAY OF SURGERY.        Medication instructions:  You may take blood pressure medication with a small drink of water the morning of surgery.    Stop your blood thinner  days before surgery    IF YOU ARE ON ANY OF THESE BLOOD THINNERS, MAKE SURE YOUR PHYSICIAN IS AWARE.  Eliquis/Apixaban            Wafarin/Coumadin,Jantoven  Xarelto/Rivaroxaban      Pletal/Cilostazol  Plavix/Clopidogrel          Pradaxa/Dibigatran      If you are diabetic      Follow the diabetic medicine  instructions you received during your pre-operative visit.  DO NOT take your insulin or diabetic medications the morning of surgery.  When you arrive at the surgical center, be sure to tell the nurse you are diabetic.    The following blood sugar medications have to be stopped prior to surgery:    Hold 24 hours prior to surgery:    Libraglutide - Saxenda, Victoza  Lixisenatide --Adlxyin  Exenatide  --  Byetta  Empaglifozin--Jardiance  Sitaglitin--Januvia    Hold 1 week prior to surgery:    Semaglutide - Ozempic, Wegouy, Rybelsus  Dulaglutide - Trulicity  Tirzepatide - Mounjaro  Exenatide (extended release inj)-- Bydureon BCise      Hold 48 hours prior to surgery:    Metformin, Glucovance, Metaglip, Fortamet, Glucophage, Riomet, Avandamet, Glimepiride            Other Items to bring with you and know    Insurance card  Identification card such as 's license, passport, or other picture ID  Copy of your advance directives  List of medications and allergies, if not already provided  Name and phone number of person to contact if your condition changes significantly. YOU CANNOT DRIVE YOURSELF HOME FROM THE HOSPITAL THE DAY OF SURGERY.  PLEASE UNDERSTAND THAT OUR OFFICE DOES NOT GIVE PATHOLOGY RESULTS OR TEST RESULTS OVER THE PHONE. THIS WILL BE DISCUSSED WITH YOU ON YOUR FOLLOW UP APPOINTMENT.  IF YOU SUBMIT FMLA FORMS, IT WILL TAKE 3-7 DAYS TO COMPLETE THESE          Alcohol and Surgery  We want to help you prepare for and recover from surgery as quickly and safely as possible. Be open and honest with your provider about how many drinks you have per day. Excessive alcohol use is defined as drinking more than three drinks per day. It can affect the outcome of your surgery. Binge drinking (consuming large amounts of alcohol infrequently, such as on weekends) can also affect the outcome of your surgery.  Alcohol withdrawal  If you drink more than three drinks a day, you could have a complication, called alcohol  withdrawal, after surgery.  Alcohol withdrawal is a set of symptoms that people have when they suddenly stop drinking after using alcohol  for a long time. During withdrawal, a person's central nervous system overreacts. This can cause mild symptoms such as shakiness, sweating or hallucinating. It can also cause other more serious side effects. If not treated properly, alcohol withdrawal can cause potentially life-threatening complications after surgery. This can include tremors, seizures, hallucinations, delirium tremors, and even death. Untreated alcohol withdrawal often leads to a longer stay in the hospital, potentially in the Intensive Care Unit.  Chronic heavy drinking also can interfere with several organ systems and biochemical processes in the body.  This interference can cause serious, even life-threatening complications.  Your care team can offer alcohol withdrawal treatment to help:  Decrease the risk of seizures and delirium tremors after surgery  Decrease the risk we will need to restrain you for your own safety or the safety of others  Decrease your risk of falling after surgery  Reduce the use of potent sedative medications  Reduce the time you stay in the hospital after surgery  Reduce the time you might spend on a mechanical ventilator to help you breathe  Lower incidence of organ failure and biochemical complications  Talk to a member of your care team or your primary care physician about your alcohol use if you feel you may be at risk of any of these complications.        Smoking and Surgery  Quitting smoking is extremely important for a successful surgery and recovery. Cigarette smoking compromises your immune system. This increases your risk of an infection after surgery. Quitting the habit before surgery will decrease the surgical risks associated with smoking.

## 2025-04-09 ENCOUNTER — HOSPITAL ENCOUNTER (OUTPATIENT)
Facility: HOSPITAL | Age: 65
Discharge: HOME | End: 2025-04-09
Attending: SURGERY | Admitting: SURGERY
Payer: MEDICARE

## 2025-04-09 ENCOUNTER — ANESTHESIA EVENT (OUTPATIENT)
Dept: SURGERY | Facility: HOSPITAL | Age: 65
End: 2025-04-09
Payer: MEDICARE

## 2025-04-09 ENCOUNTER — ANESTHESIA (OUTPATIENT)
Dept: SURGERY | Facility: HOSPITAL | Age: 65
End: 2025-04-09
Payer: MEDICARE

## 2025-04-09 VITALS
DIASTOLIC BLOOD PRESSURE: 69 MMHG | BODY MASS INDEX: 37.45 KG/M2 | OXYGEN SATURATION: 99 % | HEART RATE: 99 BPM | TEMPERATURE: 98 F | WEIGHT: 233 LBS | RESPIRATION RATE: 16 BRPM | HEIGHT: 66 IN | SYSTOLIC BLOOD PRESSURE: 113 MMHG

## 2025-04-09 DIAGNOSIS — K80.00 CALCULUS OF GALLBLADDER WITH ACUTE CHOLECYSTITIS WITHOUT OBSTRUCTION: Primary | ICD-10-CM

## 2025-04-09 PROCEDURE — 71000016 HC POSTOP RECOV ADDL HR: Performed by: SURGERY

## 2025-04-09 PROCEDURE — 63600175 PHARM REV CODE 636 W HCPCS: Performed by: SURGERY

## 2025-04-09 PROCEDURE — 71000015 HC POSTOP RECOV 1ST HR: Performed by: SURGERY

## 2025-04-09 PROCEDURE — 63600175 PHARM REV CODE 636 W HCPCS: Mod: JZ,TB | Performed by: ANESTHESIOLOGY

## 2025-04-09 PROCEDURE — 88304 TISSUE EXAM BY PATHOLOGIST: CPT | Mod: 26,,, | Performed by: PATHOLOGY

## 2025-04-09 PROCEDURE — 63600175 PHARM REV CODE 636 W HCPCS: Performed by: NURSE ANESTHETIST, CERTIFIED REGISTERED

## 2025-04-09 PROCEDURE — 25000003 PHARM REV CODE 250: Performed by: SURGERY

## 2025-04-09 PROCEDURE — 25000003 PHARM REV CODE 250: Performed by: NURSE ANESTHETIST, CERTIFIED REGISTERED

## 2025-04-09 PROCEDURE — 71000039 HC RECOVERY, EACH ADD'L HOUR: Performed by: SURGERY

## 2025-04-09 PROCEDURE — 27000510 HC BLANKET BAIR HUGGER ANY SIZE: Performed by: ANESTHESIOLOGY

## 2025-04-09 PROCEDURE — 36000709 HC OR TIME LEV III EA ADD 15 MIN: Performed by: SURGERY

## 2025-04-09 PROCEDURE — 27000655: Performed by: ANESTHESIOLOGY

## 2025-04-09 PROCEDURE — 27201423 OPTIME MED/SURG SUP & DEVICES STERILE SUPPLY: Performed by: SURGERY

## 2025-04-09 PROCEDURE — 71000033 HC RECOVERY, INTIAL HOUR: Performed by: SURGERY

## 2025-04-09 PROCEDURE — 47562 LAPAROSCOPIC CHOLECYSTECTOMY: CPT | Mod: ,,, | Performed by: SURGERY

## 2025-04-09 PROCEDURE — 27000689 HC BLADE LARYNGOSCOPE ANY SIZE: Performed by: ANESTHESIOLOGY

## 2025-04-09 PROCEDURE — 37000009 HC ANESTHESIA EA ADD 15 MINS: Performed by: SURGERY

## 2025-04-09 PROCEDURE — 25000003 PHARM REV CODE 250: Performed by: ANESTHESIOLOGY

## 2025-04-09 PROCEDURE — 27000165 HC TUBE, ETT CUFFED: Performed by: ANESTHESIOLOGY

## 2025-04-09 PROCEDURE — 36000708 HC OR TIME LEV III 1ST 15 MIN: Performed by: SURGERY

## 2025-04-09 PROCEDURE — 88304 TISSUE EXAM BY PATHOLOGIST: CPT | Mod: TC,SUR | Performed by: SURGERY

## 2025-04-09 PROCEDURE — 37000008 HC ANESTHESIA 1ST 15 MINUTES: Performed by: SURGERY

## 2025-04-09 PROCEDURE — 27000716 HC OXISENSOR PROBE, ANY SIZE: Performed by: ANESTHESIOLOGY

## 2025-04-09 RX ORDER — SODIUM CHLORIDE 9 MG/ML
INJECTION, SOLUTION INTRAVENOUS CONTINUOUS
Status: DISCONTINUED | OUTPATIENT
Start: 2025-04-09 | End: 2025-04-09 | Stop reason: HOSPADM

## 2025-04-09 RX ORDER — CEFAZOLIN 2 G/1
2 INJECTION, POWDER, FOR SOLUTION INTRAMUSCULAR; INTRAVENOUS
Status: COMPLETED | OUTPATIENT
Start: 2025-04-09 | End: 2025-04-09

## 2025-04-09 RX ORDER — HYDROCODONE BITARTRATE AND ACETAMINOPHEN 7.5; 325 MG/1; MG/1
1 TABLET ORAL ONCE
Refills: 0 | Status: COMPLETED | OUTPATIENT
Start: 2025-04-09 | End: 2025-04-09

## 2025-04-09 RX ORDER — ROCURONIUM BROMIDE 10 MG/ML
INJECTION, SOLUTION INTRAVENOUS
Status: DISCONTINUED | OUTPATIENT
Start: 2025-04-09 | End: 2025-04-09

## 2025-04-09 RX ORDER — BUPIVACAINE HYDROCHLORIDE 2.5 MG/ML
INJECTION, SOLUTION EPIDURAL; INFILTRATION; INTRACAUDAL; PERINEURAL
Status: DISCONTINUED | OUTPATIENT
Start: 2025-04-09 | End: 2025-04-09 | Stop reason: HOSPADM

## 2025-04-09 RX ORDER — HYDROCODONE BITARTRATE AND ACETAMINOPHEN 7.5; 325 MG/1; MG/1
1 TABLET ORAL EVERY 6 HOURS PRN
Qty: 15 TABLET | Refills: 0 | Status: SHIPPED | OUTPATIENT
Start: 2025-04-09

## 2025-04-09 RX ORDER — GLYCOPYRROLATE 0.2 MG/ML
INJECTION INTRAMUSCULAR; INTRAVENOUS
Status: DISCONTINUED | OUTPATIENT
Start: 2025-04-09 | End: 2025-04-09

## 2025-04-09 RX ORDER — ACETAMINOPHEN 10 MG/ML
1000 INJECTION, SOLUTION INTRAVENOUS ONCE
Status: DISCONTINUED | OUTPATIENT
Start: 2025-04-09 | End: 2025-04-09 | Stop reason: HOSPADM

## 2025-04-09 RX ORDER — DEXAMETHASONE SODIUM PHOSPHATE 4 MG/ML
INJECTION, SOLUTION INTRA-ARTICULAR; INTRALESIONAL; INTRAMUSCULAR; INTRAVENOUS; SOFT TISSUE
Status: DISCONTINUED | OUTPATIENT
Start: 2025-04-09 | End: 2025-04-09

## 2025-04-09 RX ORDER — PROPOFOL 10 MG/ML
VIAL (ML) INTRAVENOUS
Status: DISCONTINUED | OUTPATIENT
Start: 2025-04-09 | End: 2025-04-09

## 2025-04-09 RX ORDER — PHENYLEPHRINE HYDROCHLORIDE 10 MG/ML
INJECTION INTRAVENOUS
Status: DISCONTINUED | OUTPATIENT
Start: 2025-04-09 | End: 2025-04-09

## 2025-04-09 RX ORDER — ACETAMINOPHEN 10 MG/ML
INJECTION, SOLUTION INTRAVENOUS
Status: DISCONTINUED | OUTPATIENT
Start: 2025-04-09 | End: 2025-04-09

## 2025-04-09 RX ORDER — MIDAZOLAM HYDROCHLORIDE 1 MG/ML
INJECTION INTRAMUSCULAR; INTRAVENOUS
Status: DISCONTINUED | OUTPATIENT
Start: 2025-04-09 | End: 2025-04-09

## 2025-04-09 RX ORDER — MEPERIDINE HYDROCHLORIDE 25 MG/ML
25 INJECTION INTRAMUSCULAR; INTRAVENOUS; SUBCUTANEOUS EVERY 10 MIN PRN
Status: DISCONTINUED | OUTPATIENT
Start: 2025-04-09 | End: 2025-04-09 | Stop reason: HOSPADM

## 2025-04-09 RX ORDER — ONDANSETRON HYDROCHLORIDE 2 MG/ML
4 INJECTION, SOLUTION INTRAVENOUS DAILY PRN
Status: DISCONTINUED | OUTPATIENT
Start: 2025-04-09 | End: 2025-04-09 | Stop reason: HOSPADM

## 2025-04-09 RX ORDER — IPRATROPIUM BROMIDE AND ALBUTEROL SULFATE 2.5; .5 MG/3ML; MG/3ML
3 SOLUTION RESPIRATORY (INHALATION) ONCE
Status: DISCONTINUED | OUTPATIENT
Start: 2025-04-09 | End: 2025-04-09 | Stop reason: HOSPADM

## 2025-04-09 RX ORDER — KETOROLAC TROMETHAMINE 30 MG/ML
30 INJECTION, SOLUTION INTRAMUSCULAR; INTRAVENOUS ONCE
Status: COMPLETED | OUTPATIENT
Start: 2025-04-09 | End: 2025-04-09

## 2025-04-09 RX ORDER — DEXMEDETOMIDINE HYDROCHLORIDE 100 UG/ML
INJECTION, SOLUTION INTRAVENOUS
Status: DISCONTINUED | OUTPATIENT
Start: 2025-04-09 | End: 2025-04-09

## 2025-04-09 RX ORDER — MORPHINE SULFATE 10 MG/ML
4 INJECTION INTRAMUSCULAR; INTRAVENOUS; SUBCUTANEOUS EVERY 5 MIN PRN
Status: DISCONTINUED | OUTPATIENT
Start: 2025-04-09 | End: 2025-04-09 | Stop reason: HOSPADM

## 2025-04-09 RX ORDER — FENTANYL CITRATE 50 UG/ML
INJECTION, SOLUTION INTRAMUSCULAR; INTRAVENOUS
Status: DISCONTINUED | OUTPATIENT
Start: 2025-04-09 | End: 2025-04-09

## 2025-04-09 RX ORDER — LIDOCAINE HYDROCHLORIDE 20 MG/ML
INJECTION, SOLUTION EPIDURAL; INFILTRATION; INTRACAUDAL; PERINEURAL
Status: DISCONTINUED | OUTPATIENT
Start: 2025-04-09 | End: 2025-04-09

## 2025-04-09 RX ORDER — DIPHENHYDRAMINE HYDROCHLORIDE 50 MG/ML
25 INJECTION, SOLUTION INTRAMUSCULAR; INTRAVENOUS EVERY 6 HOURS PRN
Status: DISCONTINUED | OUTPATIENT
Start: 2025-04-09 | End: 2025-04-09 | Stop reason: HOSPADM

## 2025-04-09 RX ORDER — HYDROMORPHONE HYDROCHLORIDE 2 MG/ML
0.5 INJECTION, SOLUTION INTRAMUSCULAR; INTRAVENOUS; SUBCUTANEOUS EVERY 5 MIN PRN
Status: DISCONTINUED | OUTPATIENT
Start: 2025-04-09 | End: 2025-04-09 | Stop reason: HOSPADM

## 2025-04-09 RX ORDER — ONDANSETRON HYDROCHLORIDE 2 MG/ML
INJECTION, SOLUTION INTRAVENOUS
Status: DISCONTINUED | OUTPATIENT
Start: 2025-04-09 | End: 2025-04-09

## 2025-04-09 RX ADMIN — PROPOFOL 200 MG: 10 INJECTION, EMULSION INTRAVENOUS at 09:04

## 2025-04-09 RX ADMIN — ROCURONIUM BROMIDE 50 MG: 10 INJECTION, SOLUTION INTRAVENOUS at 09:04

## 2025-04-09 RX ADMIN — SODIUM CHLORIDE 500 ML: 9 INJECTION, SOLUTION INTRAVENOUS at 10:04

## 2025-04-09 RX ADMIN — KETOROLAC TROMETHAMINE 30 MG: 30 INJECTION, SOLUTION INTRAMUSCULAR at 10:04

## 2025-04-09 RX ADMIN — PHENYLEPHRINE HYDROCHLORIDE 200 MCG: 10 INJECTION INTRAVENOUS at 09:04

## 2025-04-09 RX ADMIN — CEFAZOLIN 2 G: 330 INJECTION, POWDER, FOR SOLUTION INTRAMUSCULAR; INTRAVENOUS at 09:04

## 2025-04-09 RX ADMIN — ACETAMINOPHEN 1000 MG: 10 INJECTION INTRAVENOUS at 09:04

## 2025-04-09 RX ADMIN — SODIUM CHLORIDE: 9 INJECTION, SOLUTION INTRAVENOUS at 06:04

## 2025-04-09 RX ADMIN — DEXMEDETOMIDINE 10 MCG: 100 INJECTION, SOLUTION INTRAVENOUS at 09:04

## 2025-04-09 RX ADMIN — GLYCOPYRROLATE 0.2 MG: 0.4 INJECTION INTRAMUSCULAR; INTRAVENOUS at 09:04

## 2025-04-09 RX ADMIN — DEXAMETHASONE SODIUM PHOSPHATE 8 MG: 4 INJECTION, SOLUTION INTRA-ARTICULAR; INTRALESIONAL; INTRAMUSCULAR; INTRAVENOUS; SOFT TISSUE at 09:04

## 2025-04-09 RX ADMIN — MIDAZOLAM HYDROCHLORIDE 2 MG: 1 INJECTION, SOLUTION INTRAMUSCULAR; INTRAVENOUS at 08:04

## 2025-04-09 RX ADMIN — SUGAMMADEX 200 MG: 100 INJECTION, SOLUTION INTRAVENOUS at 09:04

## 2025-04-09 RX ADMIN — FENTANYL CITRATE 100 MCG: 50 INJECTION INTRAMUSCULAR; INTRAVENOUS at 09:04

## 2025-04-09 RX ADMIN — HYDROCODONE BITARTRATE AND ACETAMINOPHEN 1 TABLET: 7.5; 325 TABLET ORAL at 01:04

## 2025-04-09 RX ADMIN — LIDOCAINE HYDROCHLORIDE 100 MG: 20 INJECTION, SOLUTION EPIDURAL; INFILTRATION; INTRACAUDAL; PERINEURAL at 09:04

## 2025-04-09 RX ADMIN — ONDANSETRON 4 MG: 2 INJECTION INTRAMUSCULAR; INTRAVENOUS at 09:04

## 2025-04-09 RX ADMIN — PROPOFOL 150 MG: 10 INJECTION, EMULSION INTRAVENOUS at 09:04

## 2025-04-09 NOTE — TRANSFER OF CARE
"Anesthesia Transfer of Care Note    Patient: Maureen Easley    Procedure(s) Performed: Procedure(s) (LRB):  CHOLECYSTECTOMY, LAPAROSCOPIC (N/A)    Patient location: PACU    Anesthesia Type: general    Transport from OR: Transported from OR on 6-10 L/min O2 by face mask with adequate spontaneous ventilation    Post pain: adequate analgesia    Post assessment: no apparent anesthetic complications    Post vital signs: stable    Level of consciousness: responds to stimulation and sedated    Nausea/Vomiting: no nausea/vomiting    Complications: none    Transfer of care protocol was followed      Last vitals: Visit Vitals  BP (!) 100/43   Pulse 93   Temp 36.7 °C (98.1 °F) (Oral)   Resp (!) 25   Ht 5' 6" (1.676 m)   Wt 105.7 kg (233 lb)   SpO2 97%   Breastfeeding No   BMI 37.61 kg/m²     "

## 2025-04-09 NOTE — ANESTHESIA PROCEDURE NOTES
Intubation    Date/Time: 4/9/2025 9:14 AM    Performed by: Sarah Beaulieu CRNA  Authorized by: Manuel Mcarthur MD    Intubation:     Induction:  Intravenous    Intubated:  Postinduction    Mask Ventilation:  Easy mask    Attempts:  1    Attempted By:  CRNA    Method of Intubation:  Direct    Blade:  Luisa 3    Laryngeal View Grade: Grade IIA - cords partially seen      Difficult Airway Encountered?: No      Complications:  None    Airway Device:  Oral endotracheal tube    Airway Device Size:  7.0    Style/Cuff Inflation:  Cuffed (inflated to minimal occlusive pressure)    Inflation Amount (mL):  8    Tube secured:  21    Secured at:  The lips    Placement Verified By:  Capnometry    Complicating Factors:  None    Findings Post-Intubation:  BS equal bilateral

## 2025-04-09 NOTE — OP NOTE
[facility] Per[facility] Services  Surgery Department  Operative Note    SUMMARY     Date of Procedure: 4/9/2025     Procedure: Procedure(s) (LRB):  CHOLECYSTECTOMY, LAPAROSCOPIC (N/A)     Surgeons and Role:     * Adithya Brumfield MD - Primary    Assisting Surgeon: None    Pre-Operative Diagnosis: Calculus of gallbladder with acute cholecystitis without obstruction [K80.00]    Post-Operative Diagnosis: Post-Op Diagnosis Codes:     * Calculus of gallbladder with acute cholecystitis without obstruction [K80.00]    Anesthesia: General    Procedures Performed: laparoscopic cholecystectomy    Significant Findings of the Procedure: chronic cholecystitis with omental adhesions    Procedure in Detail: After informed consent was obtained patient was brought to the OR and prepped and draped in the usual sterile fashion. We started off by optically inserting a 5 mm trocar at the umbilical area. Pneumoperitoneum was obtained up to 15 cm of mercury of pressure. We then under direct visualization placed an additional 11 mm port in the subxiphoid area and 2 additional 5 mm ports in the right upper quadrant. We then retracted the gallbladder over the liver and began dissecting out the cystic structures. The cystic duct and the artery were the only 2 structures entering the gallbladder and these were clipped twice distally once proximally and cut between. We then used the hook cautery to remove the gallbladder from the gallbladder fossa. No holes were made in the gallbladder nor the liver. We then used an Endo Catch bag to remove the gallbladder. We then inspected our bed and clips and they were all intact and hemostatic. We watched our ports come out.  We then discontinued pneumoperitoneum. The skin was closed with a 4-0 Monocryl in a subcuticular manner and patient tolerated the procedure well.        Complications: No    Estimated Blood Loss (EBL): 5 mL           Implants: * No implants in log *    Specimens:   Specimen  (24h ago, onward)       Start     Ordered    04/09/25 0936  Surgical Pathology  RELEASE UPON ORDERING         04/09/25 0936                            Condition: Good    Disposition: PACU - hemodynamically stable.    Attestation: I was present and scrubbed for the entire procedure.

## 2025-04-09 NOTE — ANESTHESIA PREPROCEDURE EVALUATION
04/09/2025  Maureen Easley is a 64 y.o., female.      Pre-op Assessment    I have reviewed the Patient Summary Reports.     I have reviewed the Nursing Notes. I have reviewed the NPO Status.   I have reviewed the Medications.     Review of Systems  Anesthesia Hx:             Denies Family Hx of Anesthesia complications.    Denies Personal Hx of Anesthesia complications.                    Social:  Non-Smoker, No Alcohol Use       Cardiovascular:     Hypertension                                          Hepatic/GI:     GERD                Endocrine:        Obesity / BMI > 30  Psych:  Psychiatric History         Psychotic Disorder and Bipolar disorder.          Physical Exam  General: Well nourished, Cooperative, Alert and Oriented    Airway:  Mallampati: II / II  Mouth Opening: Normal  TM Distance: Normal  Neck ROM: Normal ROM    Dental:  Intact    Chest/Lungs:  Clear to auscultation    Heart:  Rate: Normal  Rhythm: Regular Rhythm  Sounds: Normal        Chemistry        Component Value Date/Time     04/07/2025 1337    K 3.8 04/07/2025 1337    CL 98 04/07/2025 1337    CO2 27 04/07/2025 1337    BUN 20 04/07/2025 1337    CREATININE 0.89 04/07/2025 1337     (H) 04/07/2025 1337        Component Value Date/Time    CALCIUM 9.6 04/07/2025 1337    ALKPHOS 91 04/07/2025 1337    AST 15 04/07/2025 1337    ALT 9 04/07/2025 1337    BILITOT 0.7 04/07/2025 1337    ESTGFRAFRICA 76 12/10/2020 2128    EGFRNONAA 67 11/19/2021 1012        Lab Results   Component Value Date    WBC 12.89 (H) 04/07/2025    HGB 12.3 04/07/2025    HCT 40.8 04/07/2025     04/07/2025     Results for orders placed or performed during the hospital encounter of 02/22/25   EKG 12-lead    Collection Time: 02/22/25 12:53 PM   Result Value Ref Range    QRS Duration 74 ms    [facility] QTC Calculation 453 ms    Narrative    Test Reason :  R07.9,    Vent. Rate : 103 BPM     Atrial Rate : 103 BPM     P-R Int : 150 ms          QRS Dur :  74 ms      QT Int : 346 ms       P-R-T Axes :  66   0  59 degrees    QTcB Int : 453 ms    Sinus tachycardia  Minimal voltage criteria for LVH, may be normal variant ( R in aVL )  Borderline Abnormal ECG  When compared with ECG of 24-Aug-2024 16:22,  No significant change was found  Confirmed by Diego Cotton ([id number]) on 2/25/2025 12:42:14 PM    Referred By: AAAREFERRAL SELF           Confirmed By: Diego Cotton         Anesthesia Plan  Type of Anesthesia, risks & benefits discussed:    Anesthesia Type: Gen ETT  Intra-op Monitoring Plan: Standard ASA Monitors  Post Op Pain Control Plan: multimodal analgesia  Induction:  IV  Airway Plan: Direct  Informed Consent: Informed consent signed with the Patient and all parties understand the risks and agree with anesthesia plan.  All questions answered.   ASA Score: 3  Day of Surgery Review of History & Physical: H&P Update referred to the surgeon/provider.I have interviewed and examined the patient. I have reviewed the patient's H&P dated: There are no significant changes.     Ready For Surgery From Anesthesia Perspective.     .

## 2025-04-09 NOTE — PLAN OF CARE
1044: Notified Dr Mcarthur of soft BP, New orders rc'd. See MAR.    1210: Patient transferred to outpatient. O2 97% on RA, /58, . Neuro intact. ABD with x4 lap punc sites with island border dressing. Scant drainage on dressing. Family at bedside. Report given to STEPHON Chan. Care released at this time.

## 2025-04-09 NOTE — ANESTHESIA POSTPROCEDURE EVALUATION
Anesthesia Post Evaluation    Patient: Maureen Easley    Procedure(s) Performed: Procedure(s) (LRB):  CHOLECYSTECTOMY, LAPAROSCOPIC (N/A)    Final Anesthesia Type: general      Patient location during evaluation: PACU  Patient participation: Yes- Able to Participate  Level of consciousness: awake and alert  Post-procedure vital signs: reviewed and stable  Pain management: adequate  Airway patency: patent  OLI mitigation strategies: Multimodal analgesia  PONV status at discharge: No PONV  Anesthetic complications: no      Cardiovascular status: blood pressure returned to baseline  Respiratory status: unassisted  Hydration status: euvolemic  Follow-up not needed.              Vitals Value Taken Time   /69 04/09/25 14:22   Temp 36.7 °C (98.1 °F) 04/09/25 09:58   Pulse 99 04/09/25 14:22   Resp 16 04/09/25 14:22   SpO2 99 % 04/09/25 14:22         Event Time   Out of Recovery 12:02:00         Pain/May Score: Pain Rating Prior to Med Admin: 6 (4/9/2025  1:41 PM)  May Score: 10 (4/9/2025  2:02 PM)

## 2025-04-22 ENCOUNTER — OFFICE VISIT (OUTPATIENT)
Dept: SURGERY | Facility: CLINIC | Age: 65
End: 2025-04-22
Payer: MEDICARE

## 2025-04-22 VITALS — WEIGHT: 233 LBS | HEIGHT: 66 IN | BODY MASS INDEX: 37.45 KG/M2

## 2025-04-22 DIAGNOSIS — Z90.49 STATUS POST LAPAROSCOPIC CHOLECYSTECTOMY: Primary | ICD-10-CM

## 2025-04-22 PROCEDURE — 1159F MED LIST DOCD IN RCRD: CPT | Mod: CPTII,,, | Performed by: NURSE PRACTITIONER

## 2025-04-22 PROCEDURE — 99024 POSTOP FOLLOW-UP VISIT: CPT | Mod: ,,, | Performed by: NURSE PRACTITIONER

## 2025-04-22 PROCEDURE — 3288F FALL RISK ASSESSMENT DOCD: CPT | Mod: CPTII,,, | Performed by: NURSE PRACTITIONER

## 2025-04-22 PROCEDURE — 99999 PR PBB SHADOW E&M-EST. PATIENT-LVL IV: CPT | Mod: PBBFAC,,, | Performed by: NURSE PRACTITIONER

## 2025-04-22 PROCEDURE — 1101F PT FALLS ASSESS-DOCD LE1/YR: CPT | Mod: CPTII,,, | Performed by: NURSE PRACTITIONER

## 2025-04-22 PROCEDURE — 4010F ACE/ARB THERAPY RXD/TAKEN: CPT | Mod: CPTII,,, | Performed by: NURSE PRACTITIONER

## 2025-04-22 PROCEDURE — 99214 OFFICE O/P EST MOD 30 MIN: CPT | Mod: PBBFAC | Performed by: NURSE PRACTITIONER

## 2025-04-28 NOTE — PROGRESS NOTES
Patient presents to general surgery clinic as 2 week postop laparoscopic cholecystectomy per Dr. Brumfield.  Patient is doing well.  She is tolerating regular diet.  She has had no nausea and vomiting.  She has had no diarrhea.  Wounds to abdomen are well healed.  She has no complaints.  Abdomen is soft nondistended.  Patient will follow up with general surgery clinic as needed.  She is encouraged to resume her normal daily activities as tolerated.

## 2025-05-09 ENCOUNTER — OFFICE VISIT (OUTPATIENT)
Dept: FAMILY MEDICINE | Facility: CLINIC | Age: 65
End: 2025-05-09
Payer: MEDICARE

## 2025-05-09 VITALS
BODY MASS INDEX: 36.64 KG/M2 | RESPIRATION RATE: 20 BRPM | DIASTOLIC BLOOD PRESSURE: 68 MMHG | TEMPERATURE: 98 F | SYSTOLIC BLOOD PRESSURE: 124 MMHG | WEIGHT: 227 LBS | OXYGEN SATURATION: 98 % | HEART RATE: 93 BPM

## 2025-05-09 DIAGNOSIS — J02.9 SORE THROAT: ICD-10-CM

## 2025-05-09 DIAGNOSIS — J32.9 SINUSITIS, UNSPECIFIED CHRONICITY, UNSPECIFIED LOCATION: Primary | ICD-10-CM

## 2025-05-09 LAB
CTP QC/QA: YES
MOLECULAR STREP A: NEGATIVE
POC MOLECULAR INFLUENZA A AGN: NEGATIVE
POC MOLECULAR INFLUENZA B AGN: NEGATIVE
SARS-COV-2 RDRP RESP QL NAA+PROBE: NEGATIVE

## 2025-05-09 PROCEDURE — 4010F ACE/ARB THERAPY RXD/TAKEN: CPT | Mod: ,,, | Performed by: FAMILY MEDICINE

## 2025-05-09 PROCEDURE — 99214 OFFICE O/P EST MOD 30 MIN: CPT | Mod: 25,,, | Performed by: FAMILY MEDICINE

## 2025-05-09 PROCEDURE — 3074F SYST BP LT 130 MM HG: CPT | Mod: ,,, | Performed by: FAMILY MEDICINE

## 2025-05-09 PROCEDURE — 87502 INFLUENZA DNA AMP PROBE: CPT | Mod: RHCUB | Performed by: FAMILY MEDICINE

## 2025-05-09 PROCEDURE — 87635 SARS-COV-2 COVID-19 AMP PRB: CPT | Mod: RHCUB | Performed by: FAMILY MEDICINE

## 2025-05-09 PROCEDURE — 1160F RVW MEDS BY RX/DR IN RCRD: CPT | Mod: ,,, | Performed by: FAMILY MEDICINE

## 2025-05-09 PROCEDURE — 3288F FALL RISK ASSESSMENT DOCD: CPT | Mod: ,,, | Performed by: FAMILY MEDICINE

## 2025-05-09 PROCEDURE — 1159F MED LIST DOCD IN RCRD: CPT | Mod: ,,, | Performed by: FAMILY MEDICINE

## 2025-05-09 PROCEDURE — 3008F BODY MASS INDEX DOCD: CPT | Mod: ,,, | Performed by: FAMILY MEDICINE

## 2025-05-09 PROCEDURE — 96372 THER/PROPH/DIAG INJ SC/IM: CPT | Mod: ,,, | Performed by: FAMILY MEDICINE

## 2025-05-09 PROCEDURE — 87651 STREP A DNA AMP PROBE: CPT | Mod: RHCUB | Performed by: FAMILY MEDICINE

## 2025-05-09 PROCEDURE — 3078F DIAST BP <80 MM HG: CPT | Mod: ,,, | Performed by: FAMILY MEDICINE

## 2025-05-09 PROCEDURE — 1101F PT FALLS ASSESS-DOCD LE1/YR: CPT | Mod: ,,, | Performed by: FAMILY MEDICINE

## 2025-05-09 RX ORDER — DEXAMETHASONE SODIUM PHOSPHATE 4 MG/ML
4 INJECTION, SOLUTION INTRA-ARTICULAR; INTRALESIONAL; INTRAMUSCULAR; INTRAVENOUS; SOFT TISSUE
Status: COMPLETED | OUTPATIENT
Start: 2025-05-09 | End: 2025-05-09

## 2025-05-09 RX ORDER — AMOXICILLIN AND CLAVULANATE POTASSIUM 875; 125 MG/1; MG/1
1 TABLET, FILM COATED ORAL 2 TIMES DAILY
Qty: 14 TABLET | Refills: 0 | Status: SHIPPED | OUTPATIENT
Start: 2025-05-09 | End: 2025-05-16

## 2025-05-09 RX ORDER — PREDNISONE 20 MG/1
20 TABLET ORAL DAILY
Qty: 5 TABLET | Refills: 0 | Status: SHIPPED | OUTPATIENT
Start: 2025-05-09 | End: 2025-05-14

## 2025-05-09 RX ADMIN — DEXAMETHASONE SODIUM PHOSPHATE 4 MG: 4 INJECTION, SOLUTION INTRA-ARTICULAR; INTRALESIONAL; INTRAMUSCULAR; INTRAVENOUS; SOFT TISSUE at 09:05

## 2025-05-09 NOTE — PROGRESS NOTES
Subjective:       Patient ID: Maureen Easley is a 64 y.o. female.    Chief Complaint: Cough, Sinus Problem, Sore Throat, Nasal Congestion, Fever, and Otalgia (L ear)    Cough  Associated symptoms include ear pain, a fever, postnasal drip, rhinorrhea and a sore throat. Pertinent negatives include no chest pain, chills, headaches, myalgias, rash, shortness of breath or wheezing. There is no history of environmental allergies.   Sinus Problem  Associated symptoms include congestion, coughing, ear pain, sinus pressure and a sore throat. Pertinent negatives include no chills, diaphoresis, headaches, neck pain, shortness of breath or sneezing.   Sore Throat   Associated symptoms include congestion, coughing and ear pain. Pertinent negatives include no abdominal pain, diarrhea, drooling, ear discharge, headaches, neck pain, shortness of breath, stridor, trouble swallowing or vomiting.   Fever   Associated symptoms include congestion, coughing, ear pain and a sore throat. Pertinent negatives include no abdominal pain, chest pain, diarrhea, headaches, nausea, rash, vomiting or wheezing. Her past medical history is not significant for immunocompromised state.   Otalgia   Associated symptoms include coughing, rhinorrhea and a sore throat. Pertinent negatives include no abdominal pain, diarrhea, ear discharge, headaches, hearing loss, neck pain, rash or vomiting.     Review of Systems   Constitutional:  Positive for fever. Negative for activity change, appetite change, chills, diaphoresis, fatigue and unexpected weight change.   HENT:  Positive for nasal congestion, ear pain, postnasal drip, rhinorrhea, sinus pressure/congestion and sore throat. Negative for dental problem, drooling, ear discharge, facial swelling, hearing loss, mouth sores, nosebleeds, sneezing, tinnitus, trouble swallowing, voice change and goiter.    Eyes:  Negative for photophobia, discharge, itching and visual disturbance.   Respiratory:  Positive for  cough. Negative for apnea, choking, chest tightness, shortness of breath, wheezing and stridor.    Cardiovascular:  Negative for chest pain, palpitations, leg swelling and claudication.   Gastrointestinal:  Negative for abdominal distention, abdominal pain, anal bleeding, blood in stool, change in bowel habit, constipation, diarrhea, nausea and vomiting.   Endocrine: Negative for cold intolerance, heat intolerance, polydipsia, polyphagia and polyuria.   Genitourinary:  Negative for bladder incontinence, decreased urine volume, difficulty urinating, dysuria, enuresis, flank pain, frequency, hematuria, nocturia, pelvic pain and urgency.   Musculoskeletal:  Negative for arthralgias, back pain, gait problem, joint swelling, leg pain, myalgias, neck pain, neck stiffness and joint deformity.   Integumentary:  Negative for pallor, rash, wound, breast mass and breast tenderness.   Allergic/Immunologic: Negative for environmental allergies, food allergies and immunocompromised state.   Neurological:  Negative for dizziness, vertigo, tremors, seizures, syncope, facial asymmetry, speech difficulty, weakness, light-headedness, numbness, headaches, coordination difficulties and memory loss.   Hematological:  Negative for adenopathy. Does not bruise/bleed easily.   Psychiatric/Behavioral:  Negative for agitation, behavioral problems, confusion, decreased concentration, dysphoric mood, hallucinations, self-injury, sleep disturbance and suicidal ideas. The patient is not nervous/anxious and is not hyperactive.    Breast: Negative for mass and tenderness        Objective:      Physical Exam  Vitals reviewed.   Constitutional:       Appearance: Normal appearance.   HENT:      Head: Normocephalic and atraumatic.      Right Ear: Tympanic membrane, ear canal and external ear normal.      Left Ear: Tympanic membrane, ear canal and external ear normal.      Nose: Congestion and rhinorrhea present.      Mouth/Throat:      Mouth: Mucous  membranes are moist.      Pharynx: Oropharynx is clear. Posterior oropharyngeal erythema present.   Eyes:      Extraocular Movements: Extraocular movements intact.      Conjunctiva/sclera: Conjunctivae normal.      Pupils: Pupils are equal, round, and reactive to light.   Cardiovascular:      Rate and Rhythm: Normal rate and regular rhythm.      Pulses: Normal pulses.      Heart sounds: Normal heart sounds.   Pulmonary:      Effort: Pulmonary effort is normal.      Breath sounds: Normal breath sounds.   Abdominal:      General: Bowel sounds are normal.      Palpations: Abdomen is soft.   Musculoskeletal:         General: Normal range of motion.      Cervical back: Normal range of motion and neck supple.   Skin:     General: Skin is warm and dry.   Neurological:      General: No focal deficit present.      Mental Status: She is alert. Mental status is at baseline.   Psychiatric:         Mood and Affect: Mood normal.         Behavior: Behavior normal.         Thought Content: Thought content normal.         Judgment: Judgment normal.         Assessment:       1. Sinusitis, unspecified chronicity, unspecified location    2. Sore throat        Plan:     Sinusitis, unspecified chronicity, unspecified location  -     dexAMETHasone injection 4 mg  -     amoxicillin-clavulanate 875-125mg (AUGMENTIN) 875-125 mg per tablet; Take 1 tablet by mouth 2 (two) times a day. for 7 days  Dispense: 14 tablet; Refill: 0  -     predniSONE (DELTASONE) 20 MG tablet; Take 1 tablet (20 mg total) by mouth once daily. for 5 days  Dispense: 5 tablet; Refill: 0    Sore throat  -     POCT Influenza A/B Molecular  -     POCT Strep A, Molecular  -     POCT COVID-19 Rapid Screening

## 2025-07-07 ENCOUNTER — OFFICE VISIT (OUTPATIENT)
Dept: ORTHOPEDICS | Facility: CLINIC | Age: 65
End: 2025-07-07
Payer: MEDICARE

## 2025-07-07 ENCOUNTER — HOSPITAL ENCOUNTER (OUTPATIENT)
Dept: RADIOLOGY | Facility: HOSPITAL | Age: 65
Discharge: HOME | End: 2025-07-07
Payer: MEDICARE

## 2025-07-07 VITALS — BODY MASS INDEX: 36.35 KG/M2 | WEIGHT: 226.19 LBS | HEIGHT: 66 IN

## 2025-07-07 DIAGNOSIS — M25.551 RIGHT HIP PAIN: ICD-10-CM

## 2025-07-07 DIAGNOSIS — G89.29 CHRONIC PAIN OF RIGHT KNEE: ICD-10-CM

## 2025-07-07 DIAGNOSIS — M25.561 CHRONIC PAIN OF RIGHT KNEE: ICD-10-CM

## 2025-07-07 DIAGNOSIS — M25.551 RIGHT HIP PAIN: Primary | ICD-10-CM

## 2025-07-07 PROCEDURE — 73502 X-RAY EXAM HIP UNI 2-3 VIEWS: CPT | Mod: TC,RT

## 2025-07-07 PROCEDURE — 99214 OFFICE O/P EST MOD 30 MIN: CPT | Mod: PBBFAC,25

## 2025-07-07 PROCEDURE — 20610 DRAIN/INJ JOINT/BURSA W/O US: CPT | Mod: PBBFAC,RT

## 2025-07-07 PROCEDURE — 99999 PR PBB SHADOW E&M-EST. PATIENT-LVL IV: CPT | Mod: PBBFAC,,,

## 2025-07-07 PROCEDURE — 4010F ACE/ARB THERAPY RXD/TAKEN: CPT | Mod: CPTII,,,

## 2025-07-07 PROCEDURE — 1159F MED LIST DOCD IN RCRD: CPT | Mod: CPTII,,,

## 2025-07-07 PROCEDURE — 73502 X-RAY EXAM HIP UNI 2-3 VIEWS: CPT | Mod: 26,RT,, | Performed by: RADIOLOGY

## 2025-07-07 PROCEDURE — 3288F FALL RISK ASSESSMENT DOCD: CPT | Mod: CPTII,,,

## 2025-07-07 PROCEDURE — 3008F BODY MASS INDEX DOCD: CPT | Mod: CPTII,,,

## 2025-07-07 PROCEDURE — 99213 OFFICE O/P EST LOW 20 MIN: CPT | Mod: S$PBB,25,,

## 2025-07-07 PROCEDURE — 1101F PT FALLS ASSESS-DOCD LE1/YR: CPT | Mod: CPTII,,,

## 2025-07-07 PROCEDURE — 99999PBSHW PR PBB SHADOW TECHNICAL ONLY FILED TO HB: Mod: PBBFAC,,,

## 2025-07-07 RX ORDER — BUPIVACAINE HYDROCHLORIDE 2.5 MG/ML
1 INJECTION, SOLUTION EPIDURAL; INFILTRATION; INTRACAUDAL; PERINEURAL
Status: DISCONTINUED | OUTPATIENT
Start: 2025-07-07 | End: 2025-07-07 | Stop reason: HOSPADM

## 2025-07-07 RX ORDER — BUPIVACAINE HYDROCHLORIDE 5 MG/ML
1 INJECTION, SOLUTION EPIDURAL; INTRACAUDAL; PERINEURAL
Status: DISCONTINUED | OUTPATIENT
Start: 2025-07-07 | End: 2025-07-07 | Stop reason: HOSPADM

## 2025-07-07 RX ORDER — TRIAMCINOLONE ACETONIDE 40 MG/ML
40 INJECTION, SUSPENSION INTRA-ARTICULAR; INTRAMUSCULAR
Status: DISCONTINUED | OUTPATIENT
Start: 2025-07-07 | End: 2025-07-07 | Stop reason: HOSPADM

## 2025-07-07 RX ADMIN — TRIAMCINOLONE ACETONIDE 40 MG: 40 INJECTION, SUSPENSION INTRA-ARTICULAR; INTRAMUSCULAR at 08:07

## 2025-07-07 RX ADMIN — BUPIVACAINE HYDROCHLORIDE 1 ML: 5 INJECTION, SOLUTION EPIDURAL; INTRACAUDAL; PERINEURAL at 08:07

## 2025-07-07 RX ADMIN — BUPIVACAINE HYDROCHLORIDE 1 ML: 2.5 INJECTION, SOLUTION EPIDURAL; INFILTRATION; INTRACAUDAL; PERINEURAL at 08:07

## 2025-07-07 NOTE — PROCEDURES
Large Joint Aspiration/Injection: R knee    Date/Time: 7/7/2025 8:00 AM    Performed by: Kalina Stroud PA  Authorized by: Kalina Stroud PA    Consent Done?:  Yes (Verbal)  Indications:  Arthritis and pain  Site marked: the procedure site was marked      Details:  Needle Size:  22 G  Approach:  Anterolateral  Location:  Knee  Site:  R knee  Medications:  1 mL BUPivacaine (PF) 0.25% (2.5 mg/ml) 0.25 % (2.5 mg/mL); 40 mg triamcinolone acetonide 40 mg/mL  Patient tolerance:  Patient tolerated the procedure well with no immediate complications  Large Joint Aspiration/Injection: R greater trochanteric bursa    Date/Time: 7/7/2025 8:00 AM    Performed by: Kalina Stroud PA  Authorized by: Kalina Stroud PA    Consent Done?:  Yes (Verbal)  Indications:  Pain  Site marked: the procedure site was marked      Details:  Needle Size:  25 G  Approach:  Lateral  Location:  Hip  Site:  R greater trochanteric bursa  Medications:  1 mL BUPivacaine (PF) 0.5% (5 mg/mL) 0.5 % (5 mg/mL); 40 mg triamcinolone acetonide 40 mg/mL  Patient tolerance:  Patient tolerated the procedure well with no immediate complications

## 2025-07-07 NOTE — PROGRESS NOTES
CC:   Chief Complaint   Patient presents with    Right Knee - Pain    Injections     Pt received a right knee right hip steroid injection today        Maureen Easley is a 64 y.o. female seen today for follow up of Pain of the Right Knee and Injections (Pt received a right knee right hip steroid injection today)  Since for three-month follow up chronic pain right knee.  Repeat right knee injection today.  No new fall or injury.  She is also reporting right hip and groin pain.  No other complaints today.        PAST MEDICAL HISTORY:   Past Medical History:   Diagnosis Date    Bipolar 2 disorder     Essential hypertension 05/24/2023    Goal BP less than 140/80   Lisinopril 10 mg oral daily      Hyperlipidemia     Hypertension     Type 2 macular telangiectasis, bilateral 08/13/2018        PAST SURGICAL HISTORY:   Past Surgical History:   Procedure Laterality Date    COLONOSCOPY W/ POLYPECTOMY  10/02/2024    Dr. Suarez    HYSTERECTOMY  1998    KNEE ARTHROSCOPY W/ MENISCECTOMY Right 07/11/2024    Procedure: ARTHROSCOPY, KNEE, WITH PARTIAL MEDIAL AND LATERAL MENISCECTOMY;  Surgeon: Balaji Mcfarlane MD;  Location: [facility] OR;  Service: Orthopedics;  Laterality: Right;    LAPAROSCOPIC CHOLECYSTECTOMY N/A 4/9/2025    Procedure: CHOLECYSTECTOMY, LAPAROSCOPIC;  Surgeon: Adithya Brumfield MD;  Location: [facility] OR;  Service: General;  Laterality: N/A;    ROTATOR CUFF REPAIR Right         ALLERGIES:   Review of patient's allergies indicates:   Allergen Reactions    Sulfa (sulfonamide antibiotics)     Sulfamethoxazole-trimethoprim      Other reaction(s): Unknown    Clarithromycin Rash        MEDICATIONS :  Current Medications[1]     SOCIAL HISTORY: Social History[2]     FAMILY HISTORY:   Family History   Problem Relation Name Age of Onset    Hypertension Other      Breast cancer Neg Hx      Colon cancer Neg Hx      Ovarian cancer Neg Hx            PHYSICAL EXAM:      Vitals:     Body mass index is 36.51  kg/m².    GENERAL: Well-developed, well-nourished female . The patient is alert, oriented and cooperative.    EXTREMITIES:   Right knee good range of motion, skin clean dry and intact, neurovascularly intact  Right hip tenderness to palpation over greater trochanteric bursa, some groin pain elicited upon internal and external rotation of the hip, neurovascularly intact      RADIOGRAPHIC FINDINGS:   No results found.     Patient Active Problem List    Diagnosis Date Noted    Calculus of gallbladder with acute cholecystitis without obstruction 04/09/2025    Acute superficial gastritis without hemorrhage 03/31/2025    Gastric polyp 03/31/2025    Gastroesophageal reflux disease 03/10/2025    Esophageal dysphagia 03/10/2025    Anemia 03/10/2025    Essential hypertension 05/24/2023       IMPRESSION AND PLAN:   Primary osteoarthritis and chronic right knee pain.  Repeat intra-articular steroid injection of right knee today, see procedural note for details.  Recheck 3 months.  Right hip pain.  Suspect trochanteric bursitis.  Injection given today, see procedural note for details.  We will obtain an x-ray of her right hip on the way out today.  If she continues to have hip pain recommend follow up with Dr. Mcfarlane.      No follow-ups on file.       Kalina Stroud PA-C      (Subject to voice recognition error, transcription service not allowed)         [1]   Current Outpatient Medications:     albuterol (PROVENTIL/VENTOLIN HFA) 90 mcg/actuation inhaler, Inhale 2 puffs into the lungs every 4 (four) hours as needed., Disp: , Rfl:     ARIPiprazole (ABILIFY) 10 MG Tab, Take 10 mg by mouth every morning., Disp: , Rfl:     aspirin (ECOTRIN) 81 MG EC tablet, Take 81 mg by mouth nightly., Disp: , Rfl:     atorvastatin (LIPITOR) 10 MG tablet, Take 1 tablet (10 mg total) by mouth every evening., Disp: 90 tablet, Rfl: 1    DULoxetine (CYMBALTA) 30 MG capsule, Take 30 mg by mouth once daily., Disp: , Rfl:     fexofenadine (ALLEGRA  ALLERGY) 180 MG tablet, Take 1 tablet (180 mg total) by mouth once daily., Disp: 90 tablet, Rfl: 1    fluticasone propionate (FLONASE) 50 mcg/actuation nasal spray, SPRAY 2 SPRAYS INTO EACH NOSTRIL EVERY DAY, Disp: 48 mL, Rfl: 0    gabapentin (NEURONTIN) 300 MG capsule, Take 1 capsule (300 mg total) by mouth 3 (three) times daily., Disp: 90 capsule, Rfl: 11    hydroCHLOROthiazide (HYDRODIURIL) 25 MG tablet, Take 1 tablet (25 mg total) by mouth once daily., Disp: 90 tablet, Rfl: 1    HYDROcodone-acetaminophen (NORCO) 5-325 mg per tablet, Take 1 tablet by mouth daily as needed., Disp: , Rfl:     lisinopriL (PRINIVIL,ZESTRIL) 20 MG tablet, Take 1 tablet (20 mg total) by mouth once daily., Disp: 90 tablet, Rfl: 1    metroNIDAZOLE (METROGEL) 0.75 % gel, Apply to face daily, Disp: 45 g, Rfl: 5    mirtazapine (REMERON) 15 MG tablet, Take 15 mg by mouth every evening., Disp: , Rfl:     montelukast (SINGULAIR) 10 mg tablet, Take 1 tablet (10 mg total) by mouth every evening., Disp: 90 tablet, Rfl: 1    pramipexole (MIRAPEX) 0.5 MG tablet, Take 1 tablet (0.5 mg total) by mouth every evening., Disp: 90 tablet, Rfl: 1    traZODone (DESYREL) 100 MG tablet, TAKE 1 TABLET BY MOUTH EVERY DAY AT BEDTIME AS DIRECTED, Disp: , Rfl:     estradioL (ESTRACE) 0.01 % (0.1 mg/gram) vaginal cream, Place 1 g vaginally twice a week., Disp: 42.5 g, Rfl: 11    HYDROcodone-acetaminophen (NORCO) 7.5-325 mg per tablet, Take 1 tablet by mouth every 6 (six) hours as needed for Pain. (Patient not taking: Reported on 4/22/2025), Disp: 15 tablet, Rfl: 0    pantoprazole (PROTONIX) 40 MG tablet, Take 1 tablet (40 mg total) by mouth once daily., Disp: 30 tablet, Rfl: 1  [2]   Social History  Socioeconomic History    Marital status:    Tobacco Use    Smoking status: Never     Passive exposure: Never    Smokeless tobacco: Never   Substance and Sexual Activity    Alcohol use: Never    Drug use: Never    Sexual activity: Yes     Partners: Male     Birth  control/protection: None     Social Drivers of Health     Financial Resource Strain: Low Risk  (4/17/2024)    Overall Financial Resource Strain (CARDIA)     Difficulty of Paying Living Expenses: Not hard at all   Food Insecurity: No Food Insecurity (4/17/2024)    Hunger Vital Sign     Worried About Running Out of Food in the Last Year: Never true     Ran Out of Food in the Last Year: Never true   Transportation Needs: No Transportation Needs (4/17/2024)    PRAPARE - Transportation     Lack of Transportation (Medical): No     Lack of Transportation (Non-Medical): No   Physical Activity: Insufficiently Active (4/17/2024)    Exercise Vital Sign     Days of Exercise per Week: 1 day     Minutes of Exercise per Session: 10 min   Stress: Stress Concern Present (4/17/2024)    [employer] - Occupational Stress Questionnaire     Feeling of Stress : Very much   Housing Stability: Unknown (4/17/2024)    Housing Stability Vital Sign     Unable to Pay for Housing in the Last Year: No

## 2025-07-16 ENCOUNTER — PATIENT MESSAGE (OUTPATIENT)
Dept: FAMILY MEDICINE | Facility: CLINIC | Age: 65
End: 2025-07-16
Payer: MEDICARE

## 2025-07-16 ENCOUNTER — OFFICE VISIT (OUTPATIENT)
Dept: FAMILY MEDICINE | Facility: CLINIC | Age: 65
End: 2025-07-16
Payer: MEDICARE

## 2025-07-16 VITALS
BODY MASS INDEX: 36.61 KG/M2 | WEIGHT: 227.81 LBS | HEIGHT: 66 IN | DIASTOLIC BLOOD PRESSURE: 78 MMHG | OXYGEN SATURATION: 95 % | SYSTOLIC BLOOD PRESSURE: 137 MMHG | RESPIRATION RATE: 19 BRPM | HEART RATE: 90 BPM

## 2025-07-16 DIAGNOSIS — K21.9 GASTROESOPHAGEAL REFLUX DISEASE, UNSPECIFIED WHETHER ESOPHAGITIS PRESENT: ICD-10-CM

## 2025-07-16 DIAGNOSIS — I10 PRIMARY HYPERTENSION: Primary | ICD-10-CM

## 2025-07-16 DIAGNOSIS — G89.4 CHRONIC PAIN SYNDROME: ICD-10-CM

## 2025-07-16 DIAGNOSIS — Z78.0 ENCOUNTER FOR OSTEOPOROSIS SCREENING IN ASYMPTOMATIC POSTMENOPAUSAL PATIENT: ICD-10-CM

## 2025-07-16 DIAGNOSIS — F32.9 MAJOR DEPRESSIVE DISORDER WITH SINGLE EPISODE, REMISSION STATUS UNSPECIFIED: ICD-10-CM

## 2025-07-16 DIAGNOSIS — Z13.1 SCREENING FOR DIABETES MELLITUS: ICD-10-CM

## 2025-07-16 DIAGNOSIS — E66.812 CLASS 2 SEVERE OBESITY DUE TO EXCESS CALORIES WITH SERIOUS COMORBIDITY AND BODY MASS INDEX (BMI) OF 36.0 TO 36.9 IN ADULT: ICD-10-CM

## 2025-07-16 DIAGNOSIS — J30.2 SEASONAL ALLERGIES: ICD-10-CM

## 2025-07-16 DIAGNOSIS — E78.5 HYPERLIPIDEMIA, UNSPECIFIED HYPERLIPIDEMIA TYPE: ICD-10-CM

## 2025-07-16 DIAGNOSIS — E66.01 CLASS 2 SEVERE OBESITY DUE TO EXCESS CALORIES WITH SERIOUS COMORBIDITY AND BODY MASS INDEX (BMI) OF 36.0 TO 36.9 IN ADULT: ICD-10-CM

## 2025-07-16 DIAGNOSIS — Z13.820 ENCOUNTER FOR OSTEOPOROSIS SCREENING IN ASYMPTOMATIC POSTMENOPAUSAL PATIENT: ICD-10-CM

## 2025-07-16 PROBLEM — G47.61 PERIODIC LIMB MOVEMENT DISORDER: Status: ACTIVE | Noted: 2025-03-04

## 2025-07-16 PROBLEM — E66.9 OBESITY: Status: ACTIVE | Noted: 2025-03-04

## 2025-07-16 PROBLEM — G89.29 CHRONIC PAIN: Status: ACTIVE | Noted: 2025-07-16

## 2025-07-16 PROBLEM — G47.30 SLEEP APNEA: Status: ACTIVE | Noted: 2025-07-16

## 2025-07-16 PROBLEM — G25.81 RESTLESS LEG: Status: ACTIVE | Noted: 2025-03-04

## 2025-07-16 PROBLEM — F41.0 PANIC DISORDER: Status: ACTIVE | Noted: 2025-07-16

## 2025-07-16 LAB
ALBUMIN SERPL BCP-MCNC: 3.7 G/DL (ref 3.4–4.8)
ALBUMIN/GLOB SERPL: 1 {RATIO}
ALP SERPL-CCNC: 78 U/L (ref 40–150)
ALT SERPL W P-5'-P-CCNC: 13 U/L
ANION GAP SERPL CALCULATED.3IONS-SCNC: 12 MMOL/L (ref 7–16)
AST SERPL W P-5'-P-CCNC: 53 U/L (ref 11–45)
BILIRUB SERPL-MCNC: 0.7 MG/DL
BUN SERPL-MCNC: 23 MG/DL (ref 10–20)
BUN/CREAT SERPL: 28 (ref 6–20)
CALCIUM SERPL-MCNC: 9.1 MG/DL (ref 8.4–10.2)
CHLORIDE SERPL-SCNC: 98 MMOL/L (ref 98–107)
CHOLEST SERPL-MCNC: 193 MG/DL
CHOLEST/HDLC SERPL: 3.1 {RATIO}
CO2 SERPL-SCNC: 30 MMOL/L (ref 23–31)
CREAT SERPL-MCNC: 0.81 MG/DL (ref 0.55–1.02)
EGFR (NO RACE VARIABLE) (RUSH/TITUS): 81 ML/MIN/1.73M2
EST. AVERAGE GLUCOSE BLD GHB EST-MCNC: 128 MG/DL
GLOBULIN SER-MCNC: 3.6 G/DL (ref 2–4)
GLUCOSE SERPL-MCNC: 107 MG/DL (ref 82–115)
HBA1C MFR BLD HPLC: 6.1 %
HDLC SERPL-MCNC: 62 MG/DL (ref 35–60)
LDLC SERPL CALC-MCNC: 111 MG/DL
LDLC/HDLC SERPL: 1.8 {RATIO}
NONHDLC SERPL-MCNC: 131 MG/DL
POTASSIUM SERPL-SCNC: 4 MMOL/L (ref 3.5–5.1)
PROT SERPL-MCNC: 7.3 G/DL (ref 5.8–7.6)
SODIUM SERPL-SCNC: 136 MMOL/L (ref 136–145)
T4 FREE SERPL-MCNC: 1.13 NG/DL (ref 0.7–1.48)
TRIGL SERPL-MCNC: 99 MG/DL (ref 37–140)
TSH SERPL DL<=0.005 MIU/L-ACNC: 1.19 UIU/ML (ref 0.35–4.94)
VLDLC SERPL-MCNC: 20 MG/DL

## 2025-07-16 PROCEDURE — 80061 LIPID PANEL: CPT | Mod: ,,, | Performed by: CLINICAL MEDICAL LABORATORY

## 2025-07-16 PROCEDURE — 84439 ASSAY OF FREE THYROXINE: CPT | Mod: ,,, | Performed by: CLINICAL MEDICAL LABORATORY

## 2025-07-16 PROCEDURE — 84443 ASSAY THYROID STIM HORMONE: CPT | Mod: ,,, | Performed by: CLINICAL MEDICAL LABORATORY

## 2025-07-16 PROCEDURE — 80053 COMPREHEN METABOLIC PANEL: CPT | Mod: ,,, | Performed by: CLINICAL MEDICAL LABORATORY

## 2025-07-16 RX ORDER — PANTOPRAZOLE SODIUM 40 MG/1
40 TABLET, DELAYED RELEASE ORAL DAILY
Qty: 30 TABLET | Refills: 1 | Status: CANCELLED | OUTPATIENT
Start: 2025-07-16 | End: 2025-08-15

## 2025-07-16 RX ORDER — LISINOPRIL 20 MG/1
20 TABLET ORAL DAILY
Qty: 90 TABLET | Refills: 1 | Status: CANCELLED | OUTPATIENT
Start: 2025-07-16

## 2025-07-16 RX ORDER — MONTELUKAST SODIUM 10 MG/1
10 TABLET ORAL NIGHTLY
Qty: 90 TABLET | Refills: 1 | Status: SHIPPED | OUTPATIENT
Start: 2025-07-16

## 2025-07-16 RX ORDER — HYDROCHLOROTHIAZIDE 25 MG/1
25 TABLET ORAL DAILY
Qty: 90 TABLET | Refills: 1 | Status: SHIPPED | OUTPATIENT
Start: 2025-07-16 | End: 2026-07-16

## 2025-07-16 RX ORDER — ATORVASTATIN CALCIUM 10 MG/1
10 TABLET, FILM COATED ORAL NIGHTLY
Qty: 90 TABLET | Refills: 1 | Status: SHIPPED | OUTPATIENT
Start: 2025-07-16

## 2025-07-16 RX ORDER — HYDROCHLOROTHIAZIDE 25 MG/1
25 TABLET ORAL DAILY
Qty: 90 TABLET | Refills: 1 | Status: CANCELLED | OUTPATIENT
Start: 2025-07-16 | End: 2026-07-16

## 2025-07-16 RX ORDER — FLUTICASONE PROPIONATE 50 MCG
2 SPRAY, SUSPENSION (ML) NASAL DAILY
Qty: 48 ML | Refills: 1 | Status: SHIPPED | OUTPATIENT
Start: 2025-07-16

## 2025-07-16 RX ORDER — MONTELUKAST SODIUM 10 MG/1
10 TABLET ORAL NIGHTLY
Qty: 90 TABLET | Refills: 1 | Status: CANCELLED | OUTPATIENT
Start: 2025-07-16

## 2025-07-16 RX ORDER — LISINOPRIL 20 MG/1
20 TABLET ORAL DAILY
Qty: 90 TABLET | Refills: 1 | Status: SHIPPED | OUTPATIENT
Start: 2025-07-16

## 2025-07-16 RX ORDER — PANTOPRAZOLE SODIUM 40 MG/1
40 TABLET, DELAYED RELEASE ORAL DAILY
Qty: 90 TABLET | Refills: 1 | Status: SHIPPED | OUTPATIENT
Start: 2025-07-16

## 2025-07-16 RX ORDER — ATORVASTATIN CALCIUM 10 MG/1
10 TABLET, FILM COATED ORAL NIGHTLY
Qty: 90 TABLET | Refills: 1 | Status: CANCELLED | OUTPATIENT
Start: 2025-07-16

## 2025-07-16 RX ORDER — FLUTICASONE PROPIONATE 50 MCG
SPRAY, SUSPENSION (ML) NASAL
Qty: 48 ML | Refills: 0 | Status: CANCELLED | OUTPATIENT
Start: 2025-07-16

## 2025-07-17 NOTE — PROGRESS NOTES
Subjective:       Patient ID: Maureen Easley is a 64 y.o. female.    Chief Complaint: Health Maintenance (DEXA Scan -pt wants /), wellness check up , and Medication Refill    Ms. Easley is a pleasant 63 yo female who presents to clinic to establish care and medication refills. She is accompanied by her .     She has no current complaints.  She is followed by Dr. Odonnell for chronic pain. Followed by [facility] for mental health.     Active Problem List with Overview Notes    Diagnosis Date Noted    Sleep apnea 07/16/2025    Panic disorder 07/16/2025    Chronic pain 07/16/2025    Hyperlipidemia 07/16/2025    Seasonal allergies 07/16/2025    Calculus of gallbladder with acute cholecystitis without obstruction 04/09/2025    Acute superficial gastritis without hemorrhage 03/31/2025    Gastric polyp 03/31/2025    Gastroesophageal reflux disease 03/10/2025    Esophageal dysphagia 03/10/2025    Anemia 03/10/2025    Major depression, single episode 03/04/2025    Restless leg 03/04/2025    Class 2 severe obesity due to excess calories with serious comorbidity and body mass index (BMI) of 36.0 to 36.9 in adult 03/04/2025    Periodic limb movement disorder 03/04/2025    Primary hypertension 05/24/2023     Goal BP less than 140/80   Lisinopril 10 mg oral daily          Review of Systems   Constitutional:  Negative for chills, fatigue and fever.   HENT:  Negative for congestion, hearing loss, postnasal drip, rhinorrhea, sore throat, tinnitus and voice change.    Respiratory:  Negative for apnea, cough, choking, chest tightness and shortness of breath.    Cardiovascular:  Negative for chest pain, palpitations and leg swelling.   Gastrointestinal:  Negative for abdominal pain, constipation, diarrhea and nausea.   Genitourinary:  Negative for difficulty urinating.   Neurological:  Negative for dizziness, syncope, weakness and headaches.   Psychiatric/Behavioral:  Negative for sleep disturbance.         A1C:  Recent Labs   Lab  04/03/24  1214 07/16/25  1417   Hemoglobin A1C 6.1 6.1     CBC:  Recent Labs   Lab 08/24/24  1637 02/22/25  1340 04/07/25  1337   WBC 9.09 12.76 H 12.89 H   RBC 4.13 L 4.00 L 4.57   Hemoglobin 11.1 L 11.3 L 12.3   Hematocrit 36.0 L 35.8 L 40.8   Platelet Count 250 294 375   MCV 87.2 89.5 89.3   MCH 26.9 L 28.3 26.9 L   MCHC 30.8 L 31.6 L 30.1 L     CMP:  Recent Labs   Lab 02/22/25  1340 04/07/25  1337 07/16/25  1417   Glucose 117 H 123 H 107   Calcium 8.7 9.6 9.1   Albumin 3.3 L 4.0 3.7   Total Protein 6.7 8.1 H 7.3   Sodium 135 L 138 136   Potassium 4.5 3.8 4.0   CO2 22 L 27 30   Chloride 99 98 98   BUN 25 H 20 23 H   Creatinine 0.95 0.89 0.81   Alk Phos 81 91 78   ALT 13 9 13   AST 30 15 53 H   Bilirubin, Total 0.7 0.7 0.7     LIPIDS:  Recent Labs   Lab 07/16/25  1417   TSH 1.188   HDL Cholesterol 62 H   Cholesterol 193   Triglycerides 99   LDL Calculated 111   Cholesterol/HDL Ratio (Risk Factor) 3.1   Non-     TSH:  Recent Labs   Lab 04/03/24  1214 07/16/25  1417   TSH 1.720 1.188        Objective:      Vitals:    07/16/25 1312   BP: 137/78   Pulse: 90   Resp: 19      Physical Exam  Constitutional:       Appearance: Normal appearance. She is well-developed. She is obese.   HENT:      Head: Normocephalic.      Right Ear: External ear normal.      Left Ear: External ear normal.      Nose: Nose normal.      Mouth/Throat:      Lips: Pink.      Mouth: Mucous membranes are moist.      Pharynx: Oropharynx is clear.   Eyes:      General: Lids are normal.      Pupils: Pupils are equal, round, and reactive to light.   Cardiovascular:      Rate and Rhythm: Normal rate and regular rhythm.      Pulses: Normal pulses.      Heart sounds: Normal heart sounds.   Pulmonary:      Effort: Pulmonary effort is normal.      Breath sounds: Normal breath sounds.   Abdominal:      Palpations: Abdomen is soft.   Musculoskeletal:         General: Normal range of motion.      Cervical back: Normal range of motion.   Skin:     General:  Skin is warm and dry.   Neurological:      Mental Status: She is alert and oriented to person, place, and time.   Psychiatric:         Attention and Perception: Attention normal.         Mood and Affect: Mood normal.         Speech: Speech normal.         Behavior: Behavior normal. Behavior is cooperative.       Assessment:       1. Primary hypertension    2. Hyperlipidemia, unspecified hyperlipidemia type    3. Gastroesophageal reflux disease, unspecified whether esophagitis present    4. Seasonal allergies    5. Chronic pain syndrome    6. Major depressive disorder with single episode, remission status unspecified    7. Encounter for osteoporosis screening in asymptomatic postmenopausal patient    8. Screening for diabetes mellitus    9. Class 2 severe obesity due to excess calories with serious comorbidity and body mass index (BMI) of 36.0 to 36.9 in adult        Plan:     Problem List Items Addressed This Visit          Neuro    Chronic pain    Current Assessment & Plan   Norco  Followed by Dr. Odonnell.              Psychiatric    Major depression, single episode    Current Assessment & Plan   Seen by Josiane q3 mos.   Therapy q4 mos.   Chronic, controlled.  Abilify 10mg daily  Cymbalta 30 mg daily  Remeron 15mg HS            ENT    Seasonal allergies    Current Assessment & Plan   Chronic, controlled  Singulair  Flonase  Allegra daily         Relevant Medications    montelukast (SINGULAIR) 10 mg tablet    fluticasone propionate (FLONASE) 50 mcg/actuation nasal spray       Cardiac/Vascular    Primary hypertension - Primary (Chronic)    Overview   Goal BP less than 140/80   Lisinopril 10 mg oral daily         Current Assessment & Plan   137/78  Lisinopril 20 mg daily  HCTZ 25mg daily  Continue current medications         Relevant Medications    lisinopriL (PRINIVIL,ZESTRIL) 20 MG tablet    hydroCHLOROthiazide (HYDRODIURIL) 25 MG tablet    Other Relevant Orders    TSH (Completed)    Hemoglobin A1C (Completed)     Comprehensive Metabolic Panel (Completed)    T4, Free (Completed)    Hyperlipidemia (Chronic)    Current Assessment & Plan   Chronic, controlled.  Atorvastatin  Lipid panel at goal         Relevant Medications    atorvastatin (LIPITOR) 10 MG tablet    Other Relevant Orders    Lipid Panel (Completed)    Hemoglobin A1C (Completed)    Comprehensive Metabolic Panel (Completed)       Endocrine    Class 2 severe obesity due to excess calories with serious comorbidity and body mass index (BMI) of 36.0 to 36.9 in adult    Current Assessment & Plan   Decrease caloric intake.  Increase activity 150 mins weekly  BMI 36.7 kg/m            GI    Gastroesophageal reflux disease    Current Assessment & Plan   Chronic, controlled   Protonix 40mg daily         Relevant Medications    pantoprazole (PROTONIX) 40 MG tablet     Other Visit Diagnoses         Encounter for osteoporosis screening in asymptomatic postmenopausal patient        Relevant Orders    DXA Bone Density Axial Skeleton 1 or more sites      Screening for diabetes mellitus        Relevant Orders    Hemoglobin A1C (Completed)    Comprehensive Metabolic Panel (Completed)            Health Maintenance:  Health Maintenance Topics with due status: Not Due       Topic Last Completion Date    Colorectal Cancer Screening 10/02/2024    TETANUS VACCINE 10/03/2024    Influenza Vaccine 10/03/2024    Mammogram 12/10/2024    Hemoglobin A1c (Prediabetes) 07/16/2025    Lipid Panel 07/16/2025           Zoey Nguyễn   [facility]   7/16/25

## 2025-07-17 NOTE — ASSESSMENT & PLAN NOTE
Seen by Josiane q3 mos.   Therapy q4 mos.   Chronic, controlled.  Abilify 10mg daily  Cymbalta 30 mg daily  Remeron 15mg HS

## 2025-07-30 DIAGNOSIS — K21.9 GASTROESOPHAGEAL REFLUX DISEASE, UNSPECIFIED WHETHER ESOPHAGITIS PRESENT: ICD-10-CM

## 2025-07-30 RX ORDER — PANTOPRAZOLE SODIUM 40 MG/1
40 TABLET, DELAYED RELEASE ORAL DAILY
Qty: 90 TABLET | Refills: 1 | Status: SHIPPED | OUTPATIENT
Start: 2025-07-30

## 2025-07-30 NOTE — TELEPHONE ENCOUNTER
Copied from CRM #[id number]. Topic: Medications - Medication Refill  >> Jul 30, 2025  1:31 PM Mio wrote:  Who Called: Maureen Easley    Refill or New Rx:Refill  RX Name and Strength:pantoprazole (PROTONIX) 40 MG tablet  How is the patient currently taking it? (ex. 1XDay):1xday  Is this a 30 day or 90 day RX:90  Local or Mail Order:local  List of preferred pharmacies on file (remove unneeded): [unfilled]  If different Pharmacy is requested, enter Pharmacy information here including location and phone number:      [id number] - [place], [place] - [address] AT [facility] [address]  & [facility]  [address] [place] [place] [address]  Phone: [phone] Fax: [phone]  Hours: Not open 24 hours        Preferred Method of Contact: Phone Call  Patient's Preferred Phone Number on File: [phone]

## 2025-07-31 ENCOUNTER — OFFICE VISIT (OUTPATIENT)
Dept: ORTHOPEDICS | Facility: CLINIC | Age: 65
End: 2025-07-31
Payer: MEDICARE

## 2025-07-31 VITALS — WEIGHT: 227.75 LBS | BODY MASS INDEX: 36.6 KG/M2 | HEIGHT: 66 IN

## 2025-07-31 DIAGNOSIS — M65.319 TRIGGER FINGER OF THUMB, UNSPECIFIED LATERALITY: Primary | ICD-10-CM

## 2025-07-31 PROCEDURE — 99213 OFFICE O/P EST LOW 20 MIN: CPT | Mod: PBBFAC

## 2025-07-31 PROCEDURE — 20550 NJX 1 TENDON SHEATH/LIGAMENT: CPT | Mod: PBBFAC,RT

## 2025-07-31 PROCEDURE — 99999PBSHW PR PBB SHADOW TECHNICAL ONLY FILED TO HB: Mod: PBBFAC,,,

## 2025-07-31 PROCEDURE — 99999 PR PBB SHADOW E&M-EST. PATIENT-LVL III: CPT | Mod: PBBFAC,,,

## 2025-07-31 RX ORDER — BUPIVACAINE HYDROCHLORIDE 2.5 MG/ML
0.5 INJECTION, SOLUTION EPIDURAL; INFILTRATION; INTRACAUDAL; PERINEURAL
Status: DISCONTINUED | OUTPATIENT
Start: 2025-07-31 | End: 2025-07-31 | Stop reason: HOSPADM

## 2025-07-31 RX ORDER — TRIAMCINOLONE ACETONIDE 40 MG/ML
0.5 INJECTION, SUSPENSION INTRA-ARTICULAR; INTRAMUSCULAR
Status: DISCONTINUED | OUTPATIENT
Start: 2025-07-31 | End: 2025-07-31 | Stop reason: HOSPADM

## 2025-07-31 RX ADMIN — TRIAMCINOLONE ACETONIDE 0.5 ML: 40 INJECTION, SUSPENSION INTRA-ARTICULAR; INTRAMUSCULAR at 09:07

## 2025-07-31 RX ADMIN — BUPIVACAINE HYDROCHLORIDE 0.5 ML: 2.5 INJECTION, SOLUTION EPIDURAL; INFILTRATION; INTRACAUDAL; PERINEURAL at 09:07

## 2025-07-31 NOTE — PROGRESS NOTES
CC:   Chief Complaint   Patient presents with    Right Hand - Pain     Pt receiving bilateral trigger release injections today     Left Hand - Pain        Maureen Easley is a 65 y.o. female seen today for follow up of Pain of the Right Hand (Pt receiving bilateral trigger release injections today ) and Pain of the Left Hand  Patient here for follow up of bilateral thumb trigger finger.  She last received injections by me in December of 2024.  She reports several months of pain relief with triggering returning only in the last 2 weeks.  No new fall or injury.  No other complaints today.        PAST MEDICAL HISTORY:   Past Medical History:   Diagnosis Date    Bipolar 2 disorder     Essential hypertension 05/24/2023    Goal BP less than 140/80   Lisinopril 10 mg oral daily      Hyperlipidemia     Hypertension     Type 2 macular telangiectasis, bilateral 08/13/2018        PAST SURGICAL HISTORY:   Past Surgical History:   Procedure Laterality Date    COLONOSCOPY W/ POLYPECTOMY  10/02/2024    Dr. Suarez    HYSTERECTOMY  1998    KNEE ARTHROSCOPY W/ MENISCECTOMY Right 07/11/2024    Procedure: ARTHROSCOPY, KNEE, WITH PARTIAL MEDIAL AND LATERAL MENISCECTOMY;  Surgeon: Balaji Mcfarlane MD;  Location: [facility] OR;  Service: Orthopedics;  Laterality: Right;    LAPAROSCOPIC CHOLECYSTECTOMY N/A 4/9/2025    Procedure: CHOLECYSTECTOMY, LAPAROSCOPIC;  Surgeon: Adithya Brumfield MD;  Location: [facility] OR;  Service: General;  Laterality: N/A;    ROTATOR CUFF REPAIR Right         ALLERGIES:   Review of patient's allergies indicates:   Allergen Reactions    Sulfa (sulfonamide antibiotics)     Sulfamethoxazole-trimethoprim      Other reaction(s): Unknown    Clarithromycin Rash        MEDICATIONS :  Current Medications[1]     SOCIAL HISTORY: Social History[2]     FAMILY HISTORY:   Family History   Problem Relation Name Age of Onset    Hypertension Other      Breast cancer Neg Hx      Colon cancer Neg Hx      Ovarian  cancer Neg Hx            PHYSICAL EXAM:      There were no vitals filed for this visit.  Body mass index is 36.76 kg/m².    GENERAL: Well-developed, well-nourished female . The patient is alert, oriented and cooperative.    EXTREMITIES:  Bilateral thumbs tenderness to palpation over the A1 pulleys, locking in a flexed position with the inability to actively extend, good range of motion wrist, neurovascularly intact      RADIOGRAPHIC FINDINGS:   None today     Patient Active Problem List    Diagnosis Date Noted    Sleep apnea 07/16/2025    Panic disorder 07/16/2025    Chronic pain 07/16/2025    Hyperlipidemia 07/16/2025    Seasonal allergies 07/16/2025    Calculus of gallbladder with acute cholecystitis without obstruction 04/09/2025    Acute superficial gastritis without hemorrhage 03/31/2025    Gastric polyp 03/31/2025    Gastroesophageal reflux disease 03/10/2025    Esophageal dysphagia 03/10/2025    Anemia 03/10/2025    Major depression, single episode 03/04/2025    Restless leg 03/04/2025    Class 2 severe obesity due to excess calories with serious comorbidity and body mass index (BMI) of 36.0 to 36.9 in adult 03/04/2025    Periodic limb movement disorder 03/04/2025    Primary hypertension 05/24/2023       IMPRESSION AND PLAN:  Bilateral thumb trigger finger.  Repeat injections today, see procedural note for details.  Discussed with her that these can be repeated in 3 months however if triggering returns before then recommend she follow up with Dr. Mcfarlane for possible surgical options.    No follow-ups on file.       Kalina Stroud PA-C      (Subject to voice recognition error, transcription service not allowed)         [1]   Current Outpatient Medications:     albuterol (PROVENTIL/VENTOLIN HFA) 90 mcg/actuation inhaler, Inhale 2 puffs into the lungs every 4 (four) hours as needed., Disp: , Rfl:     ARIPiprazole (ABILIFY) 10 MG Tab, Take 10 mg by mouth every morning., Disp: , Rfl:     aspirin (ECOTRIN) 81 MG  EC tablet, Take 81 mg by mouth nightly., Disp: , Rfl:     atorvastatin (LIPITOR) 10 MG tablet, Take 1 tablet (10 mg total) by mouth every evening., Disp: 90 tablet, Rfl: 1    DULoxetine (CYMBALTA) 30 MG capsule, Take 30 mg by mouth once daily., Disp: , Rfl:     fexofenadine (ALLEGRA ALLERGY) 180 MG tablet, Take 1 tablet (180 mg total) by mouth once daily., Disp: 90 tablet, Rfl: 1    fluticasone propionate (FLONASE) 50 mcg/actuation nasal spray, 2 sprays (100 mcg total) by Each Nostril route once daily., Disp: 48 mL, Rfl: 1    gabapentin (NEURONTIN) 300 MG capsule, Take 1 capsule (300 mg total) by mouth 3 (three) times daily., Disp: 90 capsule, Rfl: 11    hydroCHLOROthiazide (HYDRODIURIL) 25 MG tablet, Take 1 tablet (25 mg total) by mouth once daily., Disp: 90 tablet, Rfl: 1    lisinopriL (PRINIVIL,ZESTRIL) 20 MG tablet, Take 1 tablet (20 mg total) by mouth once daily., Disp: 90 tablet, Rfl: 1    metroNIDAZOLE (METROGEL) 0.75 % gel, Apply to face daily, Disp: 45 g, Rfl: 5    mirtazapine (REMERON) 15 MG tablet, Take 15 mg by mouth every evening., Disp: , Rfl:     montelukast (SINGULAIR) 10 mg tablet, Take 1 tablet (10 mg total) by mouth every evening., Disp: 90 tablet, Rfl: 1    pantoprazole (PROTONIX) 40 MG tablet, Take 1 tablet (40 mg total) by mouth once daily., Disp: 90 tablet, Rfl: 1    pramipexole (MIRAPEX) 0.5 MG tablet, Take 1 tablet (0.5 mg total) by mouth every evening., Disp: 90 tablet, Rfl: 1    traZODone (DESYREL) 100 MG tablet, TAKE 1 TABLET BY MOUTH EVERY DAY AT BEDTIME AS DIRECTED, Disp: , Rfl:   [2]   Social History  Socioeconomic History    Marital status:    Tobacco Use    Smoking status: Never     Passive exposure: Never    Smokeless tobacco: Never   Substance and Sexual Activity    Alcohol use: Never    Drug use: Never    Sexual activity: Yes     Partners: Male     Birth control/protection: None     Social Drivers of Health     Financial Resource Strain: Low Risk  (7/9/2025)    Overall  Financial Resource Strain (CARDIA)     Difficulty of Paying Living Expenses: Not hard at all   Food Insecurity: No Food Insecurity (7/9/2025)    Hunger Vital Sign     Worried About Running Out of Food in the Last Year: Never true     Ran Out of Food in the Last Year: Never true   Transportation Needs: No Transportation Needs (7/9/2025)    PRAPARE - Transportation     Lack of Transportation (Medical): No     Lack of Transportation (Non-Medical): No   Physical Activity: Insufficiently Active (7/9/2025)    Exercise Vital Sign     Days of Exercise per Week: 7 days     Minutes of Exercise per Session: 20 min   Stress: No Stress Concern Present (7/9/2025)    [employer] - Occupational Stress Questionnaire     Feeling of Stress : Only a little   Housing Stability: Low Risk  (7/9/2025)    Housing Stability Vital Sign     Unable to Pay for Housing in the Last Year: No     Number of Times Moved in the Last Year: 0     Homeless in the Last Year: No

## 2025-07-31 NOTE — PROCEDURES
Tendon Sheath    Date/Time: 7/31/2025 9:20 AM    Performed by: Kalina Stroud PA  Authorized by: Kalina Stroud PA    Consent Done?:  Yes (Verbal)  Indications:  Pain  Location:  Thumb  Site:  R thumb flexor tendon sheath  Needle size:  25 G  Approach:  Volar  Medications:  0.5 mL BUPivacaine (PF) 0.25% (2.5 mg/ml) 0.25 % (2.5 mg/mL); 0.5 mL triamcinolone acetonide 40 mg/mL  Patient tolerance:  Patient tolerated the procedure well with no immediate complications  Tendon Sheath    Date/Time: 7/31/2025 9:20 AM    Performed by: Kalina Stroud PA  Authorized by: Kalina Stroud PA    Consent Done?:  Yes (Verbal)  Indications:  Pain  Location:  Thumb  Site:  L thumb flexor tendon sheath  Needle size:  25 G  Approach:  Volar  Medications:  0.5 mL BUPivacaine (PF) 0.25% (2.5 mg/ml) 0.25 % (2.5 mg/mL); 0.5 mL triamcinolone acetonide 40 mg/mL  Patient tolerance:  Patient tolerated the procedure well with no immediate complications

## 2025-08-06 ENCOUNTER — HOSPITAL ENCOUNTER (OUTPATIENT)
Dept: RADIOLOGY | Facility: HOSPITAL | Age: 65
Discharge: HOME | End: 2025-08-06
Attending: NURSE PRACTITIONER
Payer: MEDICARE

## 2025-08-06 DIAGNOSIS — Z78.0 ENCOUNTER FOR OSTEOPOROSIS SCREENING IN ASYMPTOMATIC POSTMENOPAUSAL PATIENT: ICD-10-CM

## 2025-08-06 DIAGNOSIS — Z13.820 ENCOUNTER FOR OSTEOPOROSIS SCREENING IN ASYMPTOMATIC POSTMENOPAUSAL PATIENT: ICD-10-CM

## 2025-08-06 PROCEDURE — 77080 DXA BONE DENSITY AXIAL: CPT | Mod: TC

## 2025-08-06 PROCEDURE — 77080 DXA BONE DENSITY AXIAL: CPT | Mod: 26,,, | Performed by: NUCLEAR MEDICINE

## 2025-08-07 ENCOUNTER — RESULTS FOLLOW-UP (OUTPATIENT)
Dept: FAMILY MEDICINE | Facility: CLINIC | Age: 65
End: 2025-08-07
Payer: MEDICARE

## 2025-08-31 ENCOUNTER — OFFICE VISIT (OUTPATIENT)
Dept: FAMILY MEDICINE | Facility: CLINIC | Age: 65
End: 2025-08-31
Payer: MEDICARE

## 2025-08-31 VITALS
BODY MASS INDEX: 37.45 KG/M2 | HEART RATE: 77 BPM | DIASTOLIC BLOOD PRESSURE: 79 MMHG | OXYGEN SATURATION: 97 % | WEIGHT: 232 LBS | TEMPERATURE: 98 F | SYSTOLIC BLOOD PRESSURE: 142 MMHG

## 2025-08-31 DIAGNOSIS — N30.00 ACUTE CYSTITIS WITHOUT HEMATURIA: Primary | ICD-10-CM

## 2025-08-31 DIAGNOSIS — R30.0 DYSURIA: ICD-10-CM

## 2025-08-31 PROBLEM — E66.9 OBESITY: Status: ACTIVE | Noted: 2025-08-31

## 2025-08-31 PROBLEM — G47.10 HYPERSOMNIA: Status: ACTIVE | Noted: 2025-08-31

## 2025-08-31 PROBLEM — J42 CHRONIC BRONCHITIS: Status: ACTIVE | Noted: 2025-08-31

## 2025-08-31 LAB
BILIRUB SERPL-MCNC: NEGATIVE MG/DL
BLOOD URINE, POC: NEGATIVE
CLARITY, UA: CLEAR
COLOR, UA: YELLOW
GLUCOSE UR QL STRIP: NEGATIVE
KETONES UR QL STRIP: NEGATIVE
LEUKOCYTE ESTERASE URINE, POC: NEGATIVE
NITRITE, POC UA: POSITIVE
PH, POC UA: 5.5
PROTEIN, POC: NEGATIVE
SPECIFIC GRAVITY, POC UA: 1.02
UROBILINOGEN, POC UA: 0.2

## 2025-08-31 PROCEDURE — 3078F DIAST BP <80 MM HG: CPT | Mod: ,,, | Performed by: NURSE PRACTITIONER

## 2025-08-31 PROCEDURE — 3008F BODY MASS INDEX DOCD: CPT | Mod: ,,, | Performed by: NURSE PRACTITIONER

## 2025-08-31 PROCEDURE — 1101F PT FALLS ASSESS-DOCD LE1/YR: CPT | Mod: ,,, | Performed by: NURSE PRACTITIONER

## 2025-08-31 PROCEDURE — 4010F ACE/ARB THERAPY RXD/TAKEN: CPT | Mod: ,,, | Performed by: NURSE PRACTITIONER

## 2025-08-31 PROCEDURE — 3288F FALL RISK ASSESSMENT DOCD: CPT | Mod: ,,, | Performed by: NURSE PRACTITIONER

## 2025-08-31 PROCEDURE — 3077F SYST BP >= 140 MM HG: CPT | Mod: ,,, | Performed by: NURSE PRACTITIONER

## 2025-08-31 PROCEDURE — 1159F MED LIST DOCD IN RCRD: CPT | Mod: ,,, | Performed by: NURSE PRACTITIONER

## 2025-08-31 PROCEDURE — 3044F HG A1C LEVEL LT 7.0%: CPT | Mod: ,,, | Performed by: NURSE PRACTITIONER

## 2025-08-31 PROCEDURE — 1160F RVW MEDS BY RX/DR IN RCRD: CPT | Mod: ,,, | Performed by: NURSE PRACTITIONER

## 2025-08-31 PROCEDURE — 87086 URINE CULTURE/COLONY COUNT: CPT | Mod: ,,, | Performed by: CLINICAL MEDICAL LABORATORY

## 2025-08-31 PROCEDURE — 99213 OFFICE O/P EST LOW 20 MIN: CPT | Mod: ,,, | Performed by: NURSE PRACTITIONER

## 2025-08-31 RX ORDER — NITROFURANTOIN 25; 75 MG/1; MG/1
100 CAPSULE ORAL 2 TIMES DAILY
Qty: 14 CAPSULE | Refills: 0 | Status: SHIPPED | OUTPATIENT
Start: 2025-08-31 | End: 2025-09-07

## 2025-09-02 LAB — UA COMPLETE W REFLEX CULTURE PNL UR: NORMAL

## (undated) DEVICE — GLOVE SENSICARE PI GRN 7

## (undated) DEVICE — GLOVE SENSICARE PI SURG 8.5

## (undated) DEVICE — GOWN NONREINF SET-IN SLV 2XL

## (undated) DEVICE — GLOVE SENSICARE PI GRN 6.5

## (undated) DEVICE — SUT MONOCYRL 4-0 PS2 UND

## (undated) DEVICE — SOL NACL IRR 3000ML

## (undated) DEVICE — SOL NACL IRR 1000ML BTL

## (undated) DEVICE — BANDAGE ACE DOUBLE STER 6IN

## (undated) DEVICE — TROCAR KII FIOS ZTHREAD 11X100

## (undated) DEVICE — SYR 10CC LUER LOCK

## (undated) DEVICE — CORD LAP 10 DISP

## (undated) DEVICE — TROCAR ENDO Z THREAD KII 5X100

## (undated) DEVICE — TOURNIQUET SB QC SP 34X4IN

## (undated) DEVICE — STRIP MEDI WND CLSR 1/2X4IN

## (undated) DEVICE — APPLICATOR CHLORAPREP ORN 26ML

## (undated) DEVICE — CANNULA LAP SEAL Z THRD 5X100

## (undated) DEVICE — SHAVER TOMCAT 4.0

## (undated) DEVICE — GLOVE SENSICARE PI GRN 8.5

## (undated) DEVICE — GLOVE SENSICARE PI SURG 7

## (undated) DEVICE — Device

## (undated) DEVICE — SCISSOR 5MMX35CM DIRECT DRIVE

## (undated) DEVICE — IRRIGATOR ENDOSCOPY DISP.

## (undated) DEVICE — ELECTRODE LAPSCP L HOOK 36CM

## (undated) DEVICE — APPLIER CLIP ENDO MED/LG 10MM

## (undated) DEVICE — ADHESIVE MASTISOL VIAL 48/BX

## (undated) DEVICE — SPONGE GZ WOVEN 12-PLY 4X4IN

## (undated) DEVICE — TUBING CROSSFLOW INFLOW CASS

## (undated) DEVICE — SOLIDIFIER BTL W/TREAT 1500CC

## (undated) DEVICE — PACK KNEE ARTHROSCOPY  RUSH

## (undated) DEVICE — GLOVE SENSICARE PI GRN 6

## (undated) DEVICE — BAG TISS RETRV MONARCH 10MM

## (undated) DEVICE — SYR 30CC LUER LOCK

## (undated) DEVICE — WARMER BLUE HEAT SCOPE 3-12MM